# Patient Record
Sex: MALE | Race: WHITE | NOT HISPANIC OR LATINO | Employment: OTHER | URBAN - METROPOLITAN AREA
[De-identification: names, ages, dates, MRNs, and addresses within clinical notes are randomized per-mention and may not be internally consistent; named-entity substitution may affect disease eponyms.]

---

## 2017-01-05 ENCOUNTER — PATIENT MESSAGE (OUTPATIENT)
Dept: FAMILY MEDICINE | Facility: CLINIC | Age: 57
End: 2017-01-05

## 2017-03-13 ENCOUNTER — PATIENT OUTREACH (OUTPATIENT)
Dept: ADMINISTRATIVE | Facility: HOSPITAL | Age: 57
End: 2017-03-13

## 2017-03-27 ENCOUNTER — OFFICE VISIT (OUTPATIENT)
Dept: FAMILY MEDICINE | Facility: CLINIC | Age: 57
End: 2017-03-27
Payer: COMMERCIAL

## 2017-03-27 ENCOUNTER — LAB VISIT (OUTPATIENT)
Dept: LAB | Facility: HOSPITAL | Age: 57
End: 2017-03-27
Attending: FAMILY MEDICINE
Payer: COMMERCIAL

## 2017-03-27 VITALS
WEIGHT: 149.06 LBS | HEART RATE: 87 BPM | HEIGHT: 72 IN | SYSTOLIC BLOOD PRESSURE: 114 MMHG | DIASTOLIC BLOOD PRESSURE: 72 MMHG | TEMPERATURE: 97 F | RESPIRATION RATE: 18 BRPM | BODY MASS INDEX: 20.19 KG/M2

## 2017-03-27 DIAGNOSIS — Z12.5 PROSTATE CANCER SCREENING: ICD-10-CM

## 2017-03-27 DIAGNOSIS — Z00.00 PREVENTATIVE HEALTH CARE: Primary | ICD-10-CM

## 2017-03-27 DIAGNOSIS — E78.00 HYPERCHOLESTEREMIA: ICD-10-CM

## 2017-03-27 DIAGNOSIS — Z12.11 COLON CANCER SCREENING: ICD-10-CM

## 2017-03-27 DIAGNOSIS — Z00.00 PREVENTATIVE HEALTH CARE: ICD-10-CM

## 2017-03-27 LAB
ALBUMIN SERPL BCP-MCNC: 4.3 G/DL
ALP SERPL-CCNC: 66 U/L
ALT SERPL W/O P-5'-P-CCNC: 23 U/L
ANION GAP SERPL CALC-SCNC: 8 MMOL/L
AST SERPL-CCNC: 23 U/L
BASOPHILS # BLD AUTO: 0.02 K/UL
BASOPHILS NFR BLD: 0.4 %
BILIRUB SERPL-MCNC: 1.2 MG/DL
BUN SERPL-MCNC: 20 MG/DL
CALCIUM SERPL-MCNC: 9.8 MG/DL
CHLORIDE SERPL-SCNC: 106 MMOL/L
CHOLEST/HDLC SERPL: 2.6 {RATIO}
CO2 SERPL-SCNC: 28 MMOL/L
CREAT SERPL-MCNC: 1 MG/DL
DIFFERENTIAL METHOD: NORMAL
EOSINOPHIL # BLD AUTO: 0.1 K/UL
EOSINOPHIL NFR BLD: 2 %
ERYTHROCYTE [DISTWIDTH] IN BLOOD BY AUTOMATED COUNT: 13.3 %
EST. GFR  (AFRICAN AMERICAN): >60 ML/MIN/1.73 M^2
EST. GFR  (NON AFRICAN AMERICAN): >60 ML/MIN/1.73 M^2
GLUCOSE SERPL-MCNC: 87 MG/DL
HCT VFR BLD AUTO: 46.2 %
HDL/CHOLESTEROL RATIO: 38.9 %
HDLC SERPL-MCNC: 175 MG/DL
HDLC SERPL-MCNC: 68 MG/DL
HGB BLD-MCNC: 15.4 G/DL
LDLC SERPL CALC-MCNC: 97.4 MG/DL
LYMPHOCYTES # BLD AUTO: 1.2 K/UL
LYMPHOCYTES NFR BLD: 23 %
MCH RBC QN AUTO: 29.7 PG
MCHC RBC AUTO-ENTMCNC: 33.3 %
MCV RBC AUTO: 89 FL
MONOCYTES # BLD AUTO: 0.4 K/UL
MONOCYTES NFR BLD: 7.2 %
NEUTROPHILS # BLD AUTO: 3.6 K/UL
NEUTROPHILS NFR BLD: 67.2 %
NONHDLC SERPL-MCNC: 107 MG/DL
PLATELET # BLD AUTO: 235 K/UL
PMV BLD AUTO: 10.9 FL
POTASSIUM SERPL-SCNC: 4.8 MMOL/L
PROT SERPL-MCNC: 7.3 G/DL
RBC # BLD AUTO: 5.19 M/UL
SODIUM SERPL-SCNC: 142 MMOL/L
TRIGL SERPL-MCNC: 48 MG/DL
TSH SERPL DL<=0.005 MIU/L-ACNC: 1.31 UIU/ML
WBC # BLD AUTO: 5.39 K/UL

## 2017-03-27 PROCEDURE — 80053 COMPREHEN METABOLIC PANEL: CPT

## 2017-03-27 PROCEDURE — 99999 PR PBB SHADOW E&M-EST. PATIENT-LVL III: CPT | Mod: PBBFAC,,, | Performed by: FAMILY MEDICINE

## 2017-03-27 PROCEDURE — 84443 ASSAY THYROID STIM HORMONE: CPT

## 2017-03-27 PROCEDURE — 99396 PREV VISIT EST AGE 40-64: CPT | Mod: S$GLB,,, | Performed by: FAMILY MEDICINE

## 2017-03-27 PROCEDURE — 84153 ASSAY OF PSA TOTAL: CPT

## 2017-03-27 PROCEDURE — 80061 LIPID PANEL: CPT

## 2017-03-27 PROCEDURE — 85025 COMPLETE CBC W/AUTO DIFF WBC: CPT

## 2017-03-27 PROCEDURE — 36415 COLL VENOUS BLD VENIPUNCTURE: CPT | Mod: PO

## 2017-03-27 NOTE — MR AVS SNAPSHOT
[facility]  [address]  [place] [address]  Phone: [phone]                  Kilo Dailey   3/27/2017 8:00 AM   Office Visit    Description:  Male : 1960   Provider:  Yazmin Dash MD   Department:  [facility]           Reason for Visit     Annual Exam           Diagnoses this Visit        Comments    Preventative health care    -  Primary     Hypercholesteremia         Colon cancer screening         Prostate cancer screening                To Do List           Future Appointments        Provider Department Dept Phone    3/27/2017 8:40 AM LABORATORY, [address] [facility] [phone]      Goals (5 Years of Data)     None      [facility]     [facility] Care [facility] -  Assistance  Registered nurses in the [facility] provide clinical advisement, health education, appointment booking, and other advisory services.  Call for this free service at [phone].             Medications           Message regarding Medications     Verify the changes and/or additions to your medication regime listed below are the same as discussed with your clinician today.  If any of these changes or additions are incorrect, please notify your healthcare provider.        STOP taking these medications     glucosamine-chondroitin 500-400 mg tablet Take 1 tablet by mouth 3 (three) times daily.           Verify that the below list of medications is an accurate representation of the medications you are currently taking.  If none reported, the list may be blank. If incorrect, please contact your healthcare provider. Carry this list with you in case of emergency.           Current Medications     atorvastatin (LIPITOR) 10 MG tablet Take 1 tablet (10 mg total) by mouth once daily.    ibuprofen (ADVIL,MOTRIN) 800 MG tablet Take 1 tablet (800 mg total) by mouth 3 (three) times daily. For 14 days total, then only  take as needed for pain           Clinical Reference Information           Your Vitals Were     BP Pulse Temp Resp Height Weight    114/72 87 97.3 °F (36.3 °C) (Tympanic) 18 6' (1.829 m) 67.6 kg (149 lb 0.5 oz)    BMI                20.21 kg/m2          Blood Pressure          Most Recent Value    BP  114/72      Allergies as of 3/27/2017     No Known Allergies      Immunizations Administered on Date of Encounter - 3/27/2017     None      Orders Placed During Today's Visit      Normal Orders This Visit    Case request GI: COLONOSCOPY     Future Labs/Procedures Expected by Expires    CBC auto differential  3/27/2017 5/26/2018    Comprehensive metabolic panel  3/27/2017 5/26/2018    Lipid panel  3/27/2017 5/26/2018    PSA, Screening  3/27/2017 5/26/2018    TSH  3/27/2017 5/26/2018      Language Assistance Services     ATTENTION: Language assistance services are available, free of charge. Please call [phone].      ATENCIÓN: Si habla español, tiene a ramon disposición servicios gratuitos de asistencia lingüística. Llame al [phone].     CHÚ Ý: N?u b?n nói Ti?ng Vi?t, có các d?ch v? h? tr? ngôn ng? mi?n phí dành cho b?n. G?i s? [phone].         [facility] complies with applicable Federal civil rights laws and does not discriminate on the basis of race, color, national origin, age, disability, or sex.

## 2017-03-27 NOTE — PROGRESS NOTES
CHIEF COMPLAINT: This is 56-year-old male here for preventive health exam.    SUBJECTIVE: The patient is generally doing well.  He complains of intermittent right-sided neck pain and pressure-like pain in his right foot, especially if he crosses his legs or ties his shoes too tight.  He also has itching in his left inner thigh area adjacent to scrotum but reports no rash.  He requests evaluation of skin lesions on back.  Patient takes atorvastatin for hyperlipidemia and ibuprofen intermittently for musculoskeletal pain.  He has a history of BPH without urinary obstruction.  Both his father and grandfather had prostate cancer later in life.    Eye exam June 2014. Colonoscopy August 2011, due again in August 2016. Tdap August 2012.     ROS:  GENERAL: Patient denies fever, chills, night sweats. Patient denies weight gain or loss. Patient denies anorexia, fatigue, weakness or swollen glands.  SKIN: Patient denies rash or hair loss.  HEENT: Patient denies sore throat, ear pain, hearing loss, nasal congestion, or runny nose. Patient denies visual disturbance, eye irritation or discharge.  LUNGS: Patient denies cough, wheeze or hemoptysis.  CARDIOVASCULAR: Patient denies chest pain, shortness of breath, palpitations, syncope or lower extremity edema.  GI: Patient denies abdominal pain, nausea, vomiting, diarrhea, constipation, blood in stool or melena.  GENITOURINARY: Patient denies dysuria, frequency, hematuria, nocturia, urgency or incontinence.  MUSCULOSKELETAL: Patient denies joint swelling, redness or warmth.  NEUROLOGIC: Patient denies headache, vertigo, paresthesias, weakness in limb, dysarthria, dysphagia or abnormality of gait.  PSYCHIATRIC: Patient denies anxiety, depression, or memory loss.     OBJECTIVE:   GENERAL: Well-developed well-nourished , white male alert and oriented x3, in no acute distress.   SKIN: Clear without rash. Normal color and tone.  SKs on back.  HEENT: Eyes: Clear conjunctivae. Pupils equal  reactive to light and accommodation. Ears: Clear TMs clear canals. Nose: Without congestion. Pharynx: Without injection or exudates.  NECK: Supple, normal range of motion. No masses, nodes or enlarged thyroid. No JVD. Carotids 2+ and equal. No bruits.  LUNGS: Clear to auscultation. Normal respiratory effort.  CARDIOVASCULAR: Regular rhythm, normal S1, S2 without murmur, gallop or rub.  BACK: No CVA or spinal tenderness.  ABDOMEN: Normal appearance. Active bowel sounds. Soft, nontender without mass or organomegaly. No rebound or guarding.  EXTREMITIES: Without cyanosis, clubbing or edema. Distal pulses 2+ and equal. Normal range of motion in all extremities. No joint effusion, erythema or warmth.  NEUROLOGIC: Cranial nerves II through XII without deficit. Motor strength equal bilaterally. Sensation normal to touch. Deep tendon reflexes 2+ and equal. Gait without abnormality. No tremor. Negative cerebellar signs.  : Circumcised male.  Bilaterally descended testes.  No scrotal swelling or masses.  AUNG: Moderately enlarged prostate, smooth, nontender. No masses. Anorectal exam: No masses, nontender. Heme negative stool x2.    ASSESSMENT:  1. Preventative health care    2. Hypercholesteremia    3. Colon cancer screening    4. Prostate cancer screening      PLAN:   1.  Exercise regularly.  2.  Age-appropriate counseling.  3.  Fasting lab.  4.  Refill medications as needed.  5.  Reassurance regarding above issues.

## 2017-03-28 DIAGNOSIS — Z12.11 COLON CANCER SCREENING: Primary | ICD-10-CM

## 2017-03-28 LAB — COMPLEXED PSA SERPL-MCNC: 1.4 NG/ML

## 2017-03-28 RX ORDER — SODIUM, POTASSIUM,MAG SULFATES 17.5-3.13G
SOLUTION, RECONSTITUTED, ORAL ORAL
Qty: 354 ML | Refills: 0 | Status: ON HOLD | OUTPATIENT
Start: 2017-03-28 | End: 2017-04-25 | Stop reason: HOSPADM

## 2017-04-25 ENCOUNTER — HOSPITAL ENCOUNTER (OUTPATIENT)
Facility: HOSPITAL | Age: 57
Discharge: HOME | End: 2017-04-25
Attending: INTERNAL MEDICINE | Admitting: INTERNAL MEDICINE
Payer: COMMERCIAL

## 2017-04-25 ENCOUNTER — SURGERY (OUTPATIENT)
Age: 57
End: 2017-04-25

## 2017-04-25 ENCOUNTER — ANESTHESIA (OUTPATIENT)
Dept: ENDOSCOPY | Facility: HOSPITAL | Age: 57
End: 2017-04-25
Payer: COMMERCIAL

## 2017-04-25 ENCOUNTER — ANESTHESIA EVENT (OUTPATIENT)
Dept: ENDOSCOPY | Facility: HOSPITAL | Age: 57
End: 2017-04-25
Payer: COMMERCIAL

## 2017-04-25 VITALS
BODY MASS INDEX: 19.91 KG/M2 | WEIGHT: 147 LBS | HEIGHT: 72 IN | SYSTOLIC BLOOD PRESSURE: 118 MMHG | OXYGEN SATURATION: 100 % | RESPIRATION RATE: 11 BRPM | DIASTOLIC BLOOD PRESSURE: 78 MMHG | TEMPERATURE: 98 F | HEART RATE: 78 BPM | RESPIRATION RATE: 20 BRPM

## 2017-04-25 DIAGNOSIS — Z12.11 SCREEN FOR COLON CANCER: ICD-10-CM

## 2017-04-25 PROCEDURE — 37000009 HC ANESTHESIA EA ADD 15 MINS: Performed by: INTERNAL MEDICINE

## 2017-04-25 PROCEDURE — 45380 COLONOSCOPY AND BIOPSY: CPT | Mod: 33,,, | Performed by: INTERNAL MEDICINE

## 2017-04-25 PROCEDURE — 88305 TISSUE EXAM BY PATHOLOGIST: CPT | Performed by: PATHOLOGY

## 2017-04-25 PROCEDURE — 25000003 PHARM REV CODE 250: Performed by: NURSE ANESTHETIST, CERTIFIED REGISTERED

## 2017-04-25 PROCEDURE — 45380 COLONOSCOPY AND BIOPSY: CPT | Performed by: INTERNAL MEDICINE

## 2017-04-25 PROCEDURE — 63600175 PHARM REV CODE 636 W HCPCS: Performed by: NURSE ANESTHETIST, CERTIFIED REGISTERED

## 2017-04-25 PROCEDURE — 27201012 HC FORCEPS, HOT/COLD, DISP: Performed by: INTERNAL MEDICINE

## 2017-04-25 PROCEDURE — 25000003 PHARM REV CODE 250: Performed by: INTERNAL MEDICINE

## 2017-04-25 PROCEDURE — 88305 TISSUE EXAM BY PATHOLOGIST: CPT | Mod: 26,,, | Performed by: PATHOLOGY

## 2017-04-25 PROCEDURE — 37000008 HC ANESTHESIA 1ST 15 MINUTES: Performed by: INTERNAL MEDICINE

## 2017-04-25 RX ORDER — PROPOFOL 10 MG/ML
INJECTION, EMULSION INTRAVENOUS
Status: DISCONTINUED | OUTPATIENT
Start: 2017-04-25 | End: 2017-04-25

## 2017-04-25 RX ORDER — SODIUM CHLORIDE, SODIUM LACTATE, POTASSIUM CHLORIDE, CALCIUM CHLORIDE 600; 310; 30; 20 MG/100ML; MG/100ML; MG/100ML; MG/100ML
INJECTION, SOLUTION INTRAVENOUS CONTINUOUS
Status: DISCONTINUED | OUTPATIENT
Start: 2017-04-25 | End: 2017-04-25 | Stop reason: HOSPADM

## 2017-04-25 RX ORDER — SODIUM CHLORIDE, SODIUM LACTATE, POTASSIUM CHLORIDE, CALCIUM CHLORIDE 600; 310; 30; 20 MG/100ML; MG/100ML; MG/100ML; MG/100ML
INJECTION, SOLUTION INTRAVENOUS CONTINUOUS PRN
Status: DISCONTINUED | OUTPATIENT
Start: 2017-04-25 | End: 2017-04-25

## 2017-04-25 RX ORDER — LIDOCAINE HCL/PF 100 MG/5ML
SYRINGE (ML) INTRAVENOUS
Status: DISCONTINUED | OUTPATIENT
Start: 2017-04-25 | End: 2017-04-25

## 2017-04-25 RX ADMIN — PROPOFOL 30 MG: 10 INJECTION, EMULSION INTRAVENOUS at 09:04

## 2017-04-25 RX ADMIN — LIDOCAINE HYDROCHLORIDE 100 MG: 20 INJECTION, SOLUTION INTRAVENOUS at 09:04

## 2017-04-25 RX ADMIN — PROPOFOL 130 MG: 10 INJECTION, EMULSION INTRAVENOUS at 09:04

## 2017-04-25 RX ADMIN — SODIUM CHLORIDE, SODIUM LACTATE, POTASSIUM CHLORIDE, AND CALCIUM CHLORIDE: 600; 310; 30; 20 INJECTION, SOLUTION INTRAVENOUS at 09:04

## 2017-04-25 NOTE — IP AVS SNAPSHOT
[address] [place] [address]           Patient Discharge Instructions   Our goal is to set you up for success. This packet includes information on your condition, medications, and your home care.  It will help you care for yourself to prevent having to return to the hospital.     Please ask your nurse if you have any questions.      There are many details to remember when preparing to leave the hospital. Here is what you will need to do:    1. Take your medicine. If you are prescribed medications, review your Medication List on the following pages. You may have new medications to  at the pharmacy and others that you'll need to stop taking. Review the instructions for how and when to take your medications. Talk with your doctor or nurses if you are unsure of what to do.     2. Go to your follow-up appointments. Specific follow-up information is listed in the following pages. Your may be contacted by a nurse or clinical provider about future appointments. Be sure we have all of the phone numbers to reach you. Please contact your provider's office if you are unable to make an appointment.     3. Watch for warning signs. Your doctor or nurse will give you detailed warning signs to watch for and when to call for assistance. These instructions may also include educational information about your condition. If you experience any of warning signs to your health, call your doctor.               ** Verify the list of medication(s) below is accurate and up to date. Carry this with you in case of emergency. If your medications have changed, please notify your healthcare provider.             Medication List      CONTINUE taking these medications        Additional Info                      atorvastatin 10 MG tablet   Commonly known as:  LIPITOR   Quantity:  90 tablet   Refills:  0   Dose:  10 mg    Instructions:  Take 1 tablet (10 mg total) by mouth once daily.     Begin  Date    AM    Noon    PM    Bedtime       ibuprofen 800 MG tablet   Commonly known as:  ADVIL,MOTRIN   Quantity:  50 tablet   Refills:  1   Dose:  800 mg    Instructions:  Take 1 tablet (800 mg total) by mouth 3 (three) times daily. For 14 days total, then only take as needed for pain     Begin Date    AM    Noon    PM    Bedtime         STOP taking these medications     sodium,potassium,mag sulfates 17.5-3.13-1.6 gram Solr   Commonly known as:  SUPREP BOWEL PREP KIT                  Please bring to all follow up appointments:    1. A copy of your discharge instructions.  2. All medicines you are currently taking in their original bottles.  3. Identification and insurance card.    Please arrive 15 minutes ahead of scheduled appointment time.    Please call 24 hours in advance if you must reschedule your appointment and/or time.        Follow-up Information     Follow up with Yazmin Dash MD.    Specialty:  Family Medicine    Contact information:    [address] [place] [id number] [phone]          Discharge Instructions     Future Orders    Activity as tolerated     Diet general     Questions:    Total calories:      Fat restriction, if any:      Protein restriction, if any:      Na restriction, if any:      Fluid restriction:      Additional restrictions:          Discharge Instructions         Understanding Colon and Rectal Polyps    The colon (also called the large intestine) is a muscular tube that forms the last part of the digestive tract. It absorbs water and stores food waste. The colon is about 4 to 6 feet long. The rectum is the last 6 inches of the colon. The colon and rectum have a smooth lining composed of millions of cells. Changes in these cells can lead to growths in the colon that can become cancerous and should be removed. Multiple tests are available to screen for colon cancer, but the colonoscopy is the most recommended test. During colonoscopy, these polyps can be removed. How  often you need this test depends on many things including your condition, your family history, symptoms, and what the findings were at the previous colonoscopy.   When the colon lining changes  Changes that happen in the cells that line the colon or rectum can lead to growths called polyps. Over a period of years, polyps can turn cancerous. Removing polyps early may prevent cancer from ever forming.  Polyps  Polyps are fleshy clumps of tissue that form on the lining of the colon or rectum. Small polyps are usually benign (not cancerous). However, over time, cells in a polyp can change and become cancerous. Certain types of polyps known as adenomatous polyps are premalignant. The risk for invasive cancer increases with the size of the polyp and certain cell and gene features. This means that they can become cancerous if they're not removed. Hyperplastic polyps are benign. They can grow quite large and not turn cancerous.   Cancer  Almost all colorectal cancers start when polyp cells begin growing abnormally. As a cancerous tumor grows, it may involve more and more of the colon or rectum. In time, cancer can also grow beyond the colon or rectum and spread to nearby organs or to glands called lymph nodes. The cells can also travel to other parts of the body. This is known as metastasis. The earlier a cancerous tumor is removed, the better the chance of preventing its spread.    Date Last Reviewed: 8/1/2016  © 9865-1904 [employer]. [address] [place] [address]. All rights reserved. This information is not intended as a substitute for professional medical care. Always follow your healthcare professional's instructions.            Admission Information     Date & Time Provider Department CSN    4/25/2017  7:40 AM Edilberto Perez MD [facility] [id number]      Care Providers     Provider Role Specialty Primary office phone    Edilberto Perez MD Attending Provider  Gastroenterology [phone]    Edilberto Perez MD Surgeon  Gastroenterology [phone]      Your Vitals Were     BP Pulse Temp Resp Height Weight    101/57 (BP Location: Left arm, Patient Position: Lying, BP Method: Automatic) 72 97.9 °F (36.6 °C) (Oral) 16 6' (1.829 m) 66.7 kg (147 lb)    SpO2 BMI             96% 19.94 kg/m2         Recent Lab Values     No lab values to display.      Pending Labs     Order Current Status    Specimen to Pathology - Surgery Collected (04/25/17 0929)      Allergies as of 4/25/2017     No Known Allergies      [facility]     [facility] Care [facility] - 24/7 Assistance  Unless otherwise directed by your provider, please contact [facility], our nurse care line that is available for 24/7 assistance.     Registered nurses in the [facility] provide clinical advisement, health education, appointment booking, and other advisory services.  Call for this free service at [phone].        Advance Directives     An advance directive is a document which, in the event you are no longer able to make decisions for yourself, tells your healthcare team what kind of treatment you do or do not want to receive, or who you would like to make those decisions for you.  If you do not currently have an advance directive, [facility] encourages you to create one.  For more information call:  [phone]), [phone] ([phone]),  or log on to [email or web address].        Language Assistance Services     ATTENTION: Language assistance services are available, free of charge. Please call [phone].      ATENCIÓN: Si habla español, tiene a ramon disposición servicios gratuitos de asistencia lingüística. Llame al [phone].     CHÚ Ý: N?u b?n nói Ti?ng Vi?t, có các d?ch v? h? tr? ngôn ng? mi?n phí dành cho b?n. G?i s? [phone].         [facility]  complies with applicable Federal civil rights laws and does not discriminate on  the basis of race, color, national origin, age, disability, or sex.

## 2017-04-25 NOTE — ANESTHESIA RELEASE NOTE
Anesthesia Release from PACU Note    Patient: Kilo Dailey    Procedure(s) Performed: Procedure(s) (LRB):  COLONOSCOPY (N/A)    Anesthesia type: MAC    Post pain: Adequate analgesia    Post assessment: no apparent anesthetic complications, tolerated procedure well and no evidence of recall    Last Vitals:   Visit Vitals    BP (!) 101/57 (BP Location: Left arm, Patient Position: Lying, BP Method: Automatic)    Pulse 72    Temp 36.6 °C (97.9 °F) (Oral)    Resp 16    Ht 6' (1.829 m)    Wt 66.7 kg (147 lb)    SpO2 96%    BMI 19.94 kg/m2       Post vital signs: stable    Level of consciousness: awake and alert     Nausea/Vomiting: no nausea/no vomiting    Complications: none    Airway Patency: patent    Respiratory: unassisted, spontaneous ventilation, room air    Cardiovascular: stable    Hydration: euvolemic

## 2017-04-25 NOTE — H&P
Short Stay Endoscopy History and Physical    PCP - Yazmin Dash MD    Procedure - Colonoscopy  ASA - II  Mallampati - per anesthesia  History of Anesthesia problems - no  Family history Anesthesia problems -  no     HPI:  This is a 56 y.o. male here for evaluation of :  Screening for colon cancer    Average Risk Screening:no  Family history of colon cancer: No  History of polyps: yes  Anemia: No  Blood in stools: No  Diarrhea: No  Abdominal Pain: No    Review of Systems:  CONSTITUTIONAL: Denies weight change,  fatigue, fevers, chills, night sweats.  CARDIOVASCULAR: Denies chest pain, shortness of breath, orthopnea and edema.  RESPIRATORY: Denies cough, hemoptysis, dyspnea, and wheezing.  GI: See HPI.    Medical History:  Past Medical History:   Diagnosis Date    Allergic rhinitis     BPH (benign prostatic hyperplasia)     Colon polyps     Hyperlipidemia     Tennis elbow     Tinea versicolor        Surgical History:   History reviewed. No pertinent surgical history.    Family History:   Family History   Problem Relation Age of Onset    Hyperlipidemia Father     Hearing loss Father     Prostate cancer Father     Prostate cancer Paternal Grandfather     Hyperlipidemia Mother     Dementia Mother     Seizures Mother     Retinitis pigmentosa Sister     Cataracts Sister     Alzheimer's disease Maternal Aunt     Cataracts Maternal Aunt     Alzheimer's disease Maternal Aunt     Alzheimer's disease Maternal Uncle     Cataracts Maternal Uncle     Liver cancer Maternal Uncle     Cataracts Maternal Grandmother        Social History:   Social History   Substance Use Topics    Smoking status: Never Smoker    Smokeless tobacco: Never Used    Alcohol use Yes      Comment: 1-2 times a month       Allergies: Reviewed.    Medications:  No current facility-administered medications on file prior to encounter.      Current Outpatient Prescriptions on File Prior to Encounter   Medication Sig Dispense  Refill    atorvastatin (LIPITOR) 10 MG tablet Take 1 tablet (10 mg total) by mouth once daily. 90 tablet 0    ibuprofen (ADVIL,MOTRIN) 800 MG tablet Take 1 tablet (800 mg total) by mouth 3 (three) times daily. For 14 days total, then only take as needed for pain 50 tablet 1       Physical Exam:  Vital Signs:   Vitals:    04/25/17 0808   BP: (!) 149/80   Pulse: 86   Resp: 18   Temp: 97.8 °F (36.6 °C)     General Appearance: Well appearing in no acute distress  ENT: OP clear  Chest: CTA B  CV: RRR, no m/r/g  Abd: s/nt/nd/nabs  Ext: no edema    Labs:  Lab Results   Component Value Date    WBC 5.39 03/27/2017    HGB 15.4 03/27/2017    HCT 46.2 03/27/2017    MCV 89 03/27/2017     03/27/2017     No results found for: INR, PROTIME  No results found for: IRON, TIBC, FERRITIN, SATURATEDIRO      IMPRESSION:  Patient Active Problem List   Diagnosis    Hypercholesteremia    BPH (benign prostatic hyperplasia)    Screen for colon cancer       Colon polyps    Plan:  I have explained the risks and benefits of colonoscopy to the patient including but not limited to bleeding, perforation, infection, and death. The patient wishes to proceed with colonoscopy.

## 2017-04-25 NOTE — ANESTHESIA PREPROCEDURE EVALUATION
04/25/2017  Kilo Dailey is a 56 y.o., male.    Anesthesia Evaluation    I have reviewed the Patient Summary Reports.    I have reviewed the Nursing Notes.   I have reviewed the Medications.     Review of Systems  Anesthesia Hx:  No problems with previous Anesthesia    Social:  Non-Smoker, No Alcohol Use    Hematology/Oncology:  Hematology Normal   Oncology Normal     EENT/Dental:EENT/Dental Normal   Cardiovascular:   Exercise tolerance: good hyperlipidemia Normal sinus rhythm  Possible Left atrial enlargement  Borderline Abnormal ECG   Pulmonary:   Snore   Hepatic/GI:   Bowel Prep. 0530 last drink of fluid.   Musculoskeletal:   Arthritis     Neurological:  Neurology Normal    Dermatological:  Skin Normal    Psych:  Psychiatric Normal           Physical Exam  General:  Well nourished    Airway/Jaw/Neck:  Airway Findings: Mallampati: II                Anesthesia Plan  Type of Anesthesia, risks & benefits discussed:  Anesthesia Type:  MAC  Patient's Preference:   Intra-op Monitoring Plan:   Intra-op Monitoring Plan Comments:   Post Op Pain Control Plan:   Post Op Pain Control Plan Comments:   Induction:   IV  Beta Blocker:  Patient is not currently on a Beta-Blocker (No further documentation required).       Informed Consent: Patient understands risks and agrees with Anesthesia plan.  Questions answered. Anesthesia consent signed with patient.  ASA Score: 2     Day of Surgery Review of History & Physical: I have interviewed and examined the patient. I have reviewed the patient's H&P dated: 04/25/17. There are no significant changes.  H&P update referred to the surgeon.         Ready For Surgery From Anesthesia Perspective.

## 2017-04-25 NOTE — DISCHARGE INSTRUCTIONS

## 2017-04-25 NOTE — BRIEF OP NOTE
[facility]   Brief Operative Note     SUMMARY     Surgery Date: 4/25/2017     Surgeon(s) and Role:     * Edilberto Perez MD - Primary    Assisting Surgeon: None    Pre-op Diagnosis:  Colon cancer screening [Z12.11]    Post-op Diagnosis:  Post-Op Diagnosis Codes:     * Colon cancer screening [Z12.11]    Procedure(s) (LRB):  COLONOSCOPY (N/A)    Anesthesia: Choice    Description of the findings of the procedure: Procedure completed. See Procedure note for details.     Findings/Key Components:  Procedure completed. See Procedure note for details.     Prosthetic/Devices: None    Estimated Blood Loss: None         Specimens:   Specimen (12h ago through future)    Start     Ordered    04/25/17 0918  Specimen to Pathology - Surgery  Once     Comments:  1. Cecum polyp    04/25/17 0929          Discharge Note    SUMMARY     Admit Date: 4/25/2017    Discharge Date and Time: 4/25/2017    Hospital Course (synopsis of major diagnoses, care, treatment, and services provided during the course of the hospital stay): Procedure completed. See Procedure note for details.     Final Diagnosis: Post-Op Diagnosis Codes:     * Colon cancer screening [Z12.11]    Disposition: Discharge home when discharge criteria met.    Follow Up/Patient Instructions: With primary care physician as previously scheduled.    Medications:  Reconciled Home Medications: Current Discharge Medication List      CONTINUE these medications which have NOT CHANGED    Details   atorvastatin (LIPITOR) 10 MG tablet Take 1 tablet (10 mg total) by mouth once daily.  Qty: 90 tablet, Refills: 0      ibuprofen (ADVIL,MOTRIN) 800 MG tablet Take 1 tablet (800 mg total) by mouth 3 (three) times daily. For 14 days total, then only take as needed for pain  Qty: 50 tablet, Refills: 1         STOP taking these medications       sodium,potassium,mag sulfates (SUPREP BOWEL PREP KIT) 17.5-3.13-1.6 gram SolR Comments:   Reason for Stopping:               Discharge  Procedure Orders  Diet general     Activity as tolerated       Follow-up Information     Follow up with Yazmin Dash MD.    Specialty:  Family Medicine    Contact information:    [address] [place] [address] [phone]

## 2017-04-25 NOTE — ANESTHESIA POSTPROCEDURE EVALUATION
Anesthesia Post Evaluation    Patient: Kilo Dailey    Procedure(s) Performed: Procedure(s) (LRB):  COLONOSCOPY (N/A)    Final Anesthesia Type: MAC  Patient location during evaluation: PACU  Patient participation: Yes- Able to Participate  Level of consciousness: awake and alert and oriented  Post-procedure vital signs: reviewed and stable  Pain management: adequate  Airway patency: patent  PONV status at discharge: No PONV  Anesthetic complications: no      Cardiovascular status: blood pressure returned to baseline  Respiratory status: unassisted, spontaneous ventilation and room air  Hydration status: euvolemic  Follow-up not needed.        Visit Vitals    BP (!) 101/57 (BP Location: Left arm, Patient Position: Lying, BP Method: Automatic)    Pulse 72    Temp 36.6 °C (97.9 °F) (Oral)    Resp 16    Ht 6' (1.829 m)    Wt 66.7 kg (147 lb)    SpO2 96%    BMI 19.94 kg/m2       Pain/Alexander Score: Pain Assessment Performed: Yes (4/25/2017  9:36 AM)  Presence of Pain: denies (4/25/2017  9:36 AM)  Alexander Score: 9 (4/25/2017  9:30 AM)

## 2017-04-25 NOTE — PLAN OF CARE
Sanna came to bedside and discussed findings. NO N/V,  no abdominal pain, no GI bleeding, and vitals stable.  Pt discharged from unit.

## 2017-04-25 NOTE — TRANSFER OF CARE
Anesthesia Transfer of Care Note    Patient: Kilo Dailey    Procedure(s) Performed: Procedure(s) (LRB):  COLONOSCOPY (N/A)    Patient location: PACU    Anesthesia Type: MAC    Transport from OR: Transported from OR on room air with adequate spontaneous ventilation    Post pain: adequate analgesia    Post assessment: no apparent anesthetic complications    Post vital signs: stable    Level of consciousness: awake and alert    Nausea/Vomiting: no nausea/vomiting    Complications: none          Last vitals:   Visit Vitals    BP (!) 101/57 (BP Location: Left arm, Patient Position: Lying, BP Method: Automatic)    Pulse 72    Temp 36.6 °C (97.9 °F) (Oral)    Resp 16    Ht 6' (1.829 m)    Wt 66.7 kg (147 lb)    SpO2 96%    BMI 19.94 kg/m2

## 2017-04-29 RX ORDER — ATORVASTATIN CALCIUM 10 MG/1
10 TABLET, FILM COATED ORAL DAILY
Qty: 90 TABLET | Refills: 3 | Status: SHIPPED | OUTPATIENT
Start: 2017-04-29 | End: 2017-08-01 | Stop reason: SDUPTHER

## 2017-06-01 ENCOUNTER — OFFICE VISIT (OUTPATIENT)
Dept: FAMILY MEDICINE | Facility: CLINIC | Age: 57
End: 2017-06-01
Payer: COMMERCIAL

## 2017-06-01 VITALS
RESPIRATION RATE: 16 BRPM | SYSTOLIC BLOOD PRESSURE: 118 MMHG | TEMPERATURE: 98 F | DIASTOLIC BLOOD PRESSURE: 74 MMHG | HEIGHT: 72 IN | OXYGEN SATURATION: 99 % | WEIGHT: 153 LBS | BODY MASS INDEX: 20.72 KG/M2 | HEART RATE: 74 BPM

## 2017-06-01 DIAGNOSIS — L21.9 SEBORRHEA: Primary | ICD-10-CM

## 2017-06-01 PROBLEM — Z12.11 SCREEN FOR COLON CANCER: Status: RESOLVED | Noted: 2017-04-25 | Resolved: 2017-06-01

## 2017-06-01 PROCEDURE — 99999 PR PBB SHADOW E&M-EST. PATIENT-LVL III: CPT | Mod: PBBFAC,,, | Performed by: REGISTERED NURSE

## 2017-06-01 PROCEDURE — 99213 OFFICE O/P EST LOW 20 MIN: CPT | Mod: S$GLB,,, | Performed by: REGISTERED NURSE

## 2017-06-01 RX ORDER — HYDROCORTISONE BUTYRATE 1 MG/ML
SOLUTION TOPICAL
Qty: 60 ML | Refills: 0 | Status: SHIPPED | OUTPATIENT
Start: 2017-06-01 | End: 2017-11-22 | Stop reason: SDUPTHER

## 2017-06-01 NOTE — PROGRESS NOTES
Subjective:       Patient ID: Kilo Dailey is a 56 y.o. male.    Chief Complaint: Hair/Scalp Problem      HPI    Mr. Dailey reports itchy scalp x few weeks.  Reports scattered areas to posterior scalp with itching and irritation.  Selsun Blue and Head/Shoulders has not helped.      Review of Systems  See HPI.      Patient Active Problem List   Diagnosis    Hypercholesteremia    BPH (benign prostatic hyperplasia)         Objective:     Physical Exam   Constitutional: He is oriented to person, place, and time. He appears well-developed and well-nourished.   HENT:   Head:       Mild seborrhea to scalp noted.   Neurological: He is alert and oriented to person, place, and time.         Medication List with Changes/Refills   New Medications    HYDROCORTISONE BUTYRATE 0.1 % SOLN    Apply to scalp 2 to 3 times daily.   Current Medications    ATORVASTATIN (LIPITOR) 10 MG TABLET    Take 1 tablet (10 mg total) by mouth once daily.   Discontinued Medications    IBUPROFEN (ADVIL,MOTRIN) 800 MG TABLET    Take 1 tablet (800 mg total) by mouth 3 (three) times daily. For 14 days total, then only take as needed for pain           Diagnosis       1. Seborrhea          Assessment/ Plan     Seborrhea  -     hydrocortisone butyrate 0.1 % Soln; Apply to scalp 2 to 3 times daily.  Dispense: 60 mL; Refill: 0      Skin care discussed.  Follow-up in clinic as needed.      JD Walker  [facility]

## 2017-06-01 NOTE — PATIENT INSTRUCTIONS
Understanding Seborrheic Dermatitis    Seborrheic dermatitis is a common type of rash that causes red, scaly, greasy skin. It occurs on skin that has oil glands, such as the face, scalp, and upper chest. It tends to last a long time, or go away and come back. Seborrheicdermatitis is not spread from person to person.  How to say it  [id number]   What causes seborrheic dermatitis?  The cause is not yet known. It may be partly caused by a type of yeast that grows on skin, along with excess oil production. Experts are still learning more. Its more common in men than women, and it can occur at any age. It happens more often in people with HIV/AIDS, Parkinson disease, alcoholic pancreatitis, hepatitis, or cancer. It can also get worse during times of stress.  Symptoms of seborrheic dermatitis  Symptoms can include skin that is:  · Bumpy  · Covered with yellow scales or crusts  · Cracked  · Greasy  · Itchy  · Leaking fluid  · Painful  · Red or orange  These symptoms can occur on skin:  · Around the nose  · Behind the ears  · In the beard  · In the eyebrows  · On the scalp, also known as dandruff  · On the upper chest  You may also have acne, inflamed eyelids (blepharitis), or other skin conditions at the same time.  Treatment for seborrheic dermatitis  Treatment can reduce symptoms for a period of time. The types of treatments most often used include:  · Antifungal shampoo, body wash, or cream. These contain medicines such as ketoconazole, fluconazole, selenium sulfide, ciclopirox, or tea tree oil.  · Corticosteroid cream or ointment. These containmedicines such ashydrocortisone or fluocinolone acetonide.  · Calcineurin inhibitorcream or ointment. These contain medicines such as pimecrolimus or tacrolimus.  · Shampoo or cream with other medicines. These contain medicines such as coal tar, salicylic acid, or zinc pyrithione.  · Sodium sulfacetemide creams and washes. These may also help.  Wash your skin  gently. You can remove scales with oil and gentle rubbing or a brush.  Living with seborrheic dermatitis  Seborrheic dermatitis is an ongoing (chronic) condition. It can go away and then come back. You will likely need to use shampoo, cream, or ointment with medicine once or twice a week. This can help to keep symptoms from coming back or getting worse.  When to call your healthcare provider  Call your healthcare provider right away if you have any of these:  · Symptoms that dont get better, or get worse  · New symptoms   Date Last Reviewed: 5/1/2016  © 2654-1090 [employer]. [address] [place] [address], [place] [address]. All rights reserved. This information is not intended as a substitute for professional medical care. Always follow your healthcare professional's instructions.

## 2017-08-01 RX ORDER — ATORVASTATIN CALCIUM 10 MG/1
TABLET, FILM COATED ORAL
Qty: 90 TABLET | Refills: 2 | Status: SHIPPED | OUTPATIENT
Start: 2017-08-01 | End: 2018-08-29 | Stop reason: SDUPTHER

## 2017-10-16 ENCOUNTER — TELEPHONE (OUTPATIENT)
Dept: FAMILY MEDICINE | Facility: CLINIC | Age: 57
End: 2017-10-16

## 2017-10-16 NOTE — TELEPHONE ENCOUNTER
Pt states he was seen on 6/1/2017  States the lotion helped some  States he still have the itchy spots   Wants to know does he just need the medication refilled or is there something else that he can try

## 2017-10-16 NOTE — TELEPHONE ENCOUNTER
----- Message from Anne Kemp sent at 10/16/2017  3:41 PM CDT -----  Would like to speak to nurse. Please call back at [phone]. thanks

## 2017-10-17 RX ORDER — KETOCONAZOLE 20 MG/ML
SHAMPOO, SUSPENSION TOPICAL
Qty: 120 ML | Refills: 0 | Status: SHIPPED | OUTPATIENT
Start: 2017-10-17 | End: 2022-12-13 | Stop reason: SDUPTHER

## 2017-11-01 ENCOUNTER — PATIENT MESSAGE (OUTPATIENT)
Dept: FAMILY MEDICINE | Facility: CLINIC | Age: 57
End: 2017-11-01

## 2017-11-01 DIAGNOSIS — H26.9 CATARACT OF RIGHT EYE, UNSPECIFIED CATARACT TYPE: Primary | ICD-10-CM

## 2017-11-22 ENCOUNTER — PATIENT MESSAGE (OUTPATIENT)
Dept: FAMILY MEDICINE | Facility: CLINIC | Age: 57
End: 2017-11-22

## 2017-11-22 DIAGNOSIS — L21.9 SEBORRHEA: ICD-10-CM

## 2017-11-22 RX ORDER — HYDROCORTISONE BUTYRATE 1 MG/ML
SOLUTION TOPICAL
Qty: 60 ML | Refills: 0 | Status: SHIPPED | OUTPATIENT
Start: 2017-11-22 | End: 2017-11-27 | Stop reason: SDUPTHER

## 2017-11-27 DIAGNOSIS — L21.9 SEBORRHEA: ICD-10-CM

## 2017-11-27 RX ORDER — HYDROCORTISONE BUTYRATE 1 MG/ML
SOLUTION TOPICAL
Qty: 60 ML | Refills: 0 | Status: SHIPPED | OUTPATIENT
Start: 2017-11-27 | End: 2018-11-28

## 2017-11-29 ENCOUNTER — OFFICE VISIT (OUTPATIENT)
Dept: OPHTHALMOLOGY | Facility: CLINIC | Age: 57
End: 2017-11-29
Payer: COMMERCIAL

## 2017-11-29 DIAGNOSIS — H52.7 REFRACTION DISORDER: ICD-10-CM

## 2017-11-29 DIAGNOSIS — H25.12 NUCLEAR SENILE CATARACT OF LEFT EYE: ICD-10-CM

## 2017-11-29 DIAGNOSIS — H25.11 NUCLEAR SENILE CATARACT OF RIGHT EYE: Primary | ICD-10-CM

## 2017-11-29 DIAGNOSIS — H40.003 GLAUCOMA SUSPECT OF BOTH EYES: ICD-10-CM

## 2017-11-29 PROCEDURE — 92136 OPHTHALMIC BIOMETRY: CPT | Mod: RT,S$GLB,, | Performed by: OPHTHALMOLOGY

## 2017-11-29 PROCEDURE — 92004 COMPRE OPH EXAM NEW PT 1/>: CPT | Mod: S$GLB,,, | Performed by: OPHTHALMOLOGY

## 2017-11-29 PROCEDURE — 92250 FUNDUS PHOTOGRAPHY W/I&R: CPT | Mod: S$GLB,,, | Performed by: OPHTHALMOLOGY

## 2017-11-29 PROCEDURE — 99999 PR PBB SHADOW E&M-EST. PATIENT-LVL III: CPT | Mod: PBBFAC,,, | Performed by: OPHTHALMOLOGY

## 2017-11-29 RX ORDER — PREDNISOLONE ACETATE 10 MG/ML
1 SUSPENSION/ DROPS OPHTHALMIC 4 TIMES DAILY
Qty: 10 ML | Refills: 1 | Status: SHIPPED | OUTPATIENT
Start: 2017-11-29 | End: 2018-01-24 | Stop reason: ALTCHOICE

## 2017-11-29 RX ORDER — POLYMYXIN B SULFATE AND TRIMETHOPRIM 1; 10000 MG/ML; [USP'U]/ML
1 SOLUTION OPHTHALMIC EVERY 4 HOURS
Qty: 1 BOTTLE | Refills: 1 | Status: SHIPPED | OUTPATIENT
Start: 2017-11-29 | End: 2017-12-09

## 2017-11-29 NOTE — PROGRESS NOTES
HPI     [place] [facility].      Was told by O.D. at [facility] that he has a cataract OD.  He states his   vision has decreased at distance OD- hard to see at Distance       NP  CAT OD  H/O WET AMD OD -DAD  H/O DRY AMD OS -DAD  H/O RP-SIS    Last edited by Chidi Matthew MD on 11/29/2017  4:12 PM. (History)            Assessment /Plan     For exam results, see Encounter Report.      ICD-10-CM ICD-9-CM    1. Nuclear senile cataract of right eye H25.11 366.16 Visually Significant Cataract OD  Patient reports decreased vision consistent with the clinical amount of lenticular opacity, which reaches the level of visual significance and affects activities of daily living including reading and glare. Risks, benefits, and alternatives to cataract surgery were discussed.   The pt expressed a desire to proceed with surgery with the potential for some reasonable degree of visual improvement.    Discussed IOL options and refractive outcomes for this patient.    Phaco right eye +18.5   No toric needed per ascan    Block  monofocal IOL. Defers specialty lenses   Will aim for distance  Referral to [facility] for Ophthalmic [facility]  Prescriptions sent for preoperative medications  Pred, Polytrim, and Ilevro  Explained that patient may need glasses after surgery.  Discussed that vision may be limited by glaucoma susp/          2. Nuclear senile cataract of left eye H25.12 366.16 Mild- follow for now    3. Glaucoma suspect of both eyes H40.003 365.00 Discussed exam findings with patient that place the patient at increased risk for COAG. Explained that the diagnosis is uncertain and further testing is indicated. Discussed importance of follow up and completion of indicated studies as well as the risk of blindness from untreated/undiagnosed coag.     4. Refraction disorder H52.7 367.9

## 2017-11-30 ENCOUNTER — TELEPHONE (OUTPATIENT)
Dept: OPHTHALMOLOGY | Facility: CLINIC | Age: 57
End: 2017-11-30

## 2017-12-08 ENCOUNTER — OFFICE VISIT (OUTPATIENT)
Dept: OPHTHALMOLOGY | Facility: CLINIC | Age: 57
End: 2017-12-08
Payer: COMMERCIAL

## 2017-12-08 DIAGNOSIS — Z98.41 CATARACT EXTRACTION STATUS, RIGHT: ICD-10-CM

## 2017-12-08 DIAGNOSIS — Z98.890 POST-OPERATIVE STATE: Primary | ICD-10-CM

## 2017-12-08 PROCEDURE — 99024 POSTOP FOLLOW-UP VISIT: CPT | Mod: S$GLB,,, | Performed by: OPHTHALMOLOGY

## 2017-12-08 PROCEDURE — 99999 PR PBB SHADOW E&M-EST. PATIENT-LVL I: CPT | Mod: PBBFAC,,, | Performed by: OPHTHALMOLOGY

## 2017-12-08 NOTE — PROGRESS NOTES
HPI     Patient states 0/10 on pain scale.        NP  PCIOL OD 12/7/17 BLOCK  H/O WET AMD OD -DAD  H/O DRY AMD OS -DAD  H/O RP-SIS    OD DUREZOL QID, POLYTRIM QID, ILEVRO ONCE DAILY    Last edited by CHRISTOPHER Mosquera on 12/8/2017  8:17 AM. (History)            Assessment /Plan     For exam results, see Encounter Report.      ICD-10-CM ICD-9-CM    1. Post-operative state Z98.890 V45.89        PO Day 1 S/P Phaco/IOL  right eye  Doing well.    Use Durezol QID  Ilevro daily  Polymyxin B 4 x day  Add lucy qid OD  Reinstructed in importance of absolute compliance with Post-OP instructions including medications, shield at bedtime, and limitation of activities. Follow up appointments in approximately one and six weeks or call immediately for increased pain, redness or vision loss.

## 2017-12-13 ENCOUNTER — OFFICE VISIT (OUTPATIENT)
Dept: OPHTHALMOLOGY | Facility: CLINIC | Age: 57
End: 2017-12-13
Payer: COMMERCIAL

## 2017-12-13 DIAGNOSIS — Z98.890 POST-OPERATIVE STATE: Primary | ICD-10-CM

## 2017-12-13 DIAGNOSIS — H25.12 NUCLEAR SENILE CATARACT OF LEFT EYE: ICD-10-CM

## 2017-12-13 DIAGNOSIS — Z98.41 CATARACT EXTRACTION STATUS, RIGHT: ICD-10-CM

## 2017-12-13 PROCEDURE — 99024 POSTOP FOLLOW-UP VISIT: CPT | Mod: S$GLB,,, | Performed by: OPHTHALMOLOGY

## 2017-12-13 PROCEDURE — 99999 PR PBB SHADOW E&M-EST. PATIENT-LVL I: CPT | Mod: PBBFAC,,, | Performed by: OPHTHALMOLOGY

## 2017-12-13 RX ORDER — POLYMYXIN B SULFATE AND TRIMETHOPRIM 1; 10000 MG/ML; [USP'U]/ML
1 SOLUTION OPHTHALMIC EVERY 6 HOURS
COMMUNITY
End: 2018-01-24 | Stop reason: ALTCHOICE

## 2017-12-13 NOTE — PROGRESS NOTES
HPI       Patient returns for a 6 day p.o. Visit, patient states his vision has   improved.        PCIOL OD +18.5 SN60WF / CDE:16.69 / 12/7/17 BLOCK  H/O WET AMD OD -DAD  H/O DRY AMD OS -DAD  H/O RP-SIS    OD PRED. ACET. QID, POLYTRIM QID, ILEVRO ONCE DAILY    Last edited by HCRISTOPHER Mosquera on 12/13/2017  2:20 PM. (History)            Assessment /Plan     For exam results, see Encounter Report.      ICD-10-CM ICD-9-CM    1. Post-operative state Z98.890 V45.89 1 wk s/p phaco OD. Doing well   2. Cataract extraction status, right Z98.41 V45.61    3. Nuclear senile cataract of left eye H25.12 366.16 Not visually significant. Will watch       PO Week 1 S/P Phaco/ IOL right eye:   Doing well with no evidence of infection or abnormal inflammation.     D/C antibiotic drops  Taper Durezol weekly per instruction sheet.  Ilevro daily 7 more days.  Resume normal activitites and d/c eye shield.  OTC reading glasses can be used until evaluated for final MR.  D/c Shield at night    RTC 4 weeks or PRN pain, redness, vision loss, or other concerns.

## 2018-01-24 ENCOUNTER — OFFICE VISIT (OUTPATIENT)
Dept: OPHTHALMOLOGY | Facility: CLINIC | Age: 58
End: 2018-01-24
Payer: COMMERCIAL

## 2018-01-24 DIAGNOSIS — H40.003 GLAUCOMA SUSPECT OF BOTH EYES: ICD-10-CM

## 2018-01-24 DIAGNOSIS — Z98.890 POST-OPERATIVE STATE: Primary | ICD-10-CM

## 2018-01-24 DIAGNOSIS — H25.12 NUCLEAR SENILE CATARACT OF LEFT EYE: ICD-10-CM

## 2018-01-24 DIAGNOSIS — Z98.41 CATARACT EXTRACTION STATUS, RIGHT: ICD-10-CM

## 2018-01-24 PROCEDURE — 99024 POSTOP FOLLOW-UP VISIT: CPT | Mod: S$GLB,,, | Performed by: OPHTHALMOLOGY

## 2018-01-24 PROCEDURE — 99999 PR PBB SHADOW E&M-EST. PATIENT-LVL I: CPT | Mod: PBBFAC,,, | Performed by: OPHTHALMOLOGY

## 2018-02-28 ENCOUNTER — OFFICE VISIT (OUTPATIENT)
Dept: OPHTHALMOLOGY | Facility: CLINIC | Age: 58
End: 2018-02-28
Payer: COMMERCIAL

## 2018-02-28 DIAGNOSIS — H25.12 NUCLEAR SENILE CATARACT OF LEFT EYE: ICD-10-CM

## 2018-02-28 DIAGNOSIS — Z98.41 CATARACT EXTRACTION STATUS, RIGHT: ICD-10-CM

## 2018-02-28 DIAGNOSIS — H40.003 GLAUCOMA SUSPECT OF BOTH EYES: Primary | ICD-10-CM

## 2018-02-28 PROCEDURE — 92133 CPTRZD OPH DX IMG PST SGM ON: CPT | Mod: S$GLB,,, | Performed by: OPHTHALMOLOGY

## 2018-02-28 PROCEDURE — 76514 ECHO EXAM OF EYE THICKNESS: CPT | Mod: S$GLB,,, | Performed by: OPHTHALMOLOGY

## 2018-02-28 PROCEDURE — 99999 PR PBB SHADOW E&M-EST. PATIENT-LVL I: CPT | Mod: PBBFAC,,, | Performed by: OPHTHALMOLOGY

## 2018-02-28 PROCEDURE — 92083 EXTENDED VISUAL FIELD XM: CPT | Mod: S$GLB,,, | Performed by: OPHTHALMOLOGY

## 2018-02-28 PROCEDURE — 92012 INTRM OPH EXAM EST PATIENT: CPT | Mod: S$GLB,,, | Performed by: OPHTHALMOLOGY

## 2018-02-28 NOTE — PROGRESS NOTES
HPI     Patient returns for a one month hvf review, goct iop check, and cct.    Last edited by CHRISTOPHER Mosquera on 2/28/2018  8:26 AM. (History)            Assessment /Plan     For exam results, see Encounter Report.      ICD-10-CM ICD-9-CM    1. Glaucoma suspect of both eyes H40.003 365.00 Zarate Visual Field - OU - Extended - Both Eyes      Posterior Segment OCT Optic Nerve- Both eyes Done today   Appears stable at this time and no evidence of glaucoma at this time   C/D Asym only- follow yearly    2. Nuclear senile cataract of left eye H25.12 366.16   You were found to have an early cataract in your eye(s) today, however the cataract is not affecting your activities of daily living, such as reading and driving.You do not need  surgery at this time. We will recheck your cataract at your next visit. You are welcome to call for an earlier appointment if your vision gets worse.      3. Cataract extraction status, right Z98.41 V45.61 Well        RETURN TO CLINIC 9 month COURTNEY, SDP   Disp MR

## 2018-03-13 ENCOUNTER — PATIENT MESSAGE (OUTPATIENT)
Dept: FAMILY MEDICINE | Facility: CLINIC | Age: 58
End: 2018-03-13

## 2018-03-16 ENCOUNTER — PATIENT MESSAGE (OUTPATIENT)
Dept: FAMILY MEDICINE | Facility: CLINIC | Age: 58
End: 2018-03-16

## 2018-04-10 ENCOUNTER — OFFICE VISIT (OUTPATIENT)
Dept: DERMATOLOGY | Facility: CLINIC | Age: 58
End: 2018-04-10
Payer: COMMERCIAL

## 2018-04-10 VITALS — HEIGHT: 72 IN | WEIGHT: 150.38 LBS | BODY MASS INDEX: 20.37 KG/M2

## 2018-04-10 DIAGNOSIS — L82.1 SEBORRHEIC KERATOSIS: ICD-10-CM

## 2018-04-10 DIAGNOSIS — L21.9 SEBORRHEIC DERMATITIS: Primary | ICD-10-CM

## 2018-04-10 DIAGNOSIS — D18.00 ANGIOMA: ICD-10-CM

## 2018-04-10 PROCEDURE — 99202 OFFICE O/P NEW SF 15 MIN: CPT | Mod: S$GLB,,, | Performed by: DERMATOLOGY

## 2018-04-10 PROCEDURE — 99999 PR PBB SHADOW E&M-EST. PATIENT-LVL III: CPT | Mod: PBBFAC,,, | Performed by: DERMATOLOGY

## 2018-04-10 RX ORDER — CLOBETASOL PROPIONATE 0.5 MG/G
AEROSOL, FOAM TOPICAL
Qty: 50 G | Refills: 3 | Status: SHIPPED | OUTPATIENT
Start: 2018-04-10 | End: 2018-06-26 | Stop reason: SDUPTHER

## 2018-04-10 NOTE — PROGRESS NOTES
Subjective:       Patient ID:  Kilo Dailey is a 57 y.o. male who presents for   Chief Complaint   Patient presents with    Dermatitis     History of Present Illness: The patient presents with chief complaint of dermatitis.  Location: scalp  Duration: 1 year  Signs/Symptoms: pruritus, inflammed    Prior treatments: ketoconazole shampoo, T-gel, head and shoulders, HTC solution          Review of Systems   Constitutional: Negative for fever and chills.   Gastrointestinal: Negative for nausea and vomiting.   Skin: Negative for daily sunscreen use, activity-related sunscreen use and recent sunburn.   Hematologic/Lymphatic: Does not bruise/bleed easily.        Objective:    Physical Exam   Constitutional: He appears well-developed and well-nourished. No distress.   Neurological: He is alert and oriented to person, place, and time. He is not disoriented.   Psychiatric: He has a normal mood and affect.   Skin:   Areas Examined (abnormalities noted in diagram):   Head / Face Inspection Performed  Neck Inspection Performed  Chest / Axilla Inspection Performed  Abdomen Inspection Performed  Back Inspection Performed  RUE Inspected  LUE Inspection Performed  Nails and Digits Inspection Performed                   Diagram Legend     Erythematous scaling macule/papule c/w actinic keratosis       Vascular papule c/w angioma      Pigmented verrucoid papule/plaque c/w seborrheic keratosis      Yellow umbilicated papule c/w sebaceous hyperplasia      Irregularly shaped tan macule c/w lentigo     1-2 mm smooth white papules consistent with Milia      Movable subcutaneous cyst with punctum c/w epidermal inclusion cyst      Subcutaneous movable cyst c/w pilar cyst      Firm pink to brown papule c/w dermatofibroma      Pedunculated fleshy papule(s) c/w skin tag(s)      Evenly pigmented macule c/w junctional nevus     Mildly variegated pigmented, slightly irregular-bordered macule c/w mildly atypical nevus      Flesh colored to  evenly pigmented papule c/w intradermal nevus       Pink pearly papule/plaque c/w basal cell carcinoma      Erythematous hyperkeratotic cursted plaque c/w SCC      Surgical scar with no sign of skin cancer recurrence      Open and closed comedones      Inflammatory papules and pustules      Verrucoid papule consistent consistent with wart     Erythematous eczematous patches and plaques     Dystrophic onycholytic nail with subungual debris c/w onychomycosis     Umbilicated papule    Erythematous-base heme-crusted tan verrucoid plaque consistent with inflamed seborrheic keratosis     Erythematous Silvery Scaling Plaque c/w Psoriasis     See annotation      Assessment / Plan:        Seborrheic dermatitis  -     clobetasol (OLUX) 0.05 % Foam; AAA bid as needed for dryness on scalp  Dispense: 50 g; Refill: 3  Discussed dx, AAD handout given, will start olux foam bid c/ ketoconazole shampoo    Seborrheic keratosis  Reassurance given. Discussed diagnosis and that lesions are benign.  AAD handout given.     Angioma  Reassurance given.  Lesions are benign.                   Follow-up in about 3 months (around 7/10/2018).

## 2018-04-12 ENCOUNTER — OFFICE VISIT (OUTPATIENT)
Dept: FAMILY MEDICINE | Facility: CLINIC | Age: 58
End: 2018-04-12
Payer: COMMERCIAL

## 2018-04-12 ENCOUNTER — LAB VISIT (OUTPATIENT)
Dept: LAB | Facility: HOSPITAL | Age: 58
End: 2018-04-12
Payer: COMMERCIAL

## 2018-04-12 VITALS
TEMPERATURE: 98 F | WEIGHT: 150.38 LBS | DIASTOLIC BLOOD PRESSURE: 80 MMHG | OXYGEN SATURATION: 99 % | HEART RATE: 89 BPM | BODY MASS INDEX: 20.37 KG/M2 | RESPIRATION RATE: 18 BRPM | SYSTOLIC BLOOD PRESSURE: 110 MMHG | HEIGHT: 72 IN

## 2018-04-12 DIAGNOSIS — M25.511 CHRONIC RIGHT SHOULDER PAIN: ICD-10-CM

## 2018-04-12 DIAGNOSIS — N40.0 BENIGN PROSTATIC HYPERPLASIA, UNSPECIFIED WHETHER LOWER URINARY TRACT SYMPTOMS PRESENT: ICD-10-CM

## 2018-04-12 DIAGNOSIS — G89.29 CHRONIC RIGHT SHOULDER PAIN: ICD-10-CM

## 2018-04-12 DIAGNOSIS — Z00.00 PREVENTATIVE HEALTH CARE: Primary | ICD-10-CM

## 2018-04-12 DIAGNOSIS — E78.00 HYPERCHOLESTEREMIA: ICD-10-CM

## 2018-04-12 DIAGNOSIS — Z00.00 PREVENTATIVE HEALTH CARE: ICD-10-CM

## 2018-04-12 LAB
ALBUMIN SERPL BCP-MCNC: 4.7 G/DL
ALP SERPL-CCNC: 57 U/L
ALT SERPL W/O P-5'-P-CCNC: 22 U/L
ANION GAP SERPL CALC-SCNC: 8 MMOL/L
AST SERPL-CCNC: 19 U/L
BASOPHILS # BLD AUTO: 0.04 K/UL
BASOPHILS NFR BLD: 0.7 %
BILIRUB SERPL-MCNC: 1.4 MG/DL
BUN SERPL-MCNC: 21 MG/DL
CALCIUM SERPL-MCNC: 10.3 MG/DL
CHLORIDE SERPL-SCNC: 104 MMOL/L
CHOLEST SERPL-MCNC: 184 MG/DL
CHOLEST/HDLC SERPL: 2.6 {RATIO}
CO2 SERPL-SCNC: 28 MMOL/L
CREAT SERPL-MCNC: 1 MG/DL
DIFFERENTIAL METHOD: NORMAL
EOSINOPHIL # BLD AUTO: 0.1 K/UL
EOSINOPHIL NFR BLD: 1 %
ERYTHROCYTE [DISTWIDTH] IN BLOOD BY AUTOMATED COUNT: 12.8 %
EST. GFR  (AFRICAN AMERICAN): >60 ML/MIN/1.73 M^2
EST. GFR  (NON AFRICAN AMERICAN): >60 ML/MIN/1.73 M^2
GLUCOSE SERPL-MCNC: 96 MG/DL
HCT VFR BLD AUTO: 48 %
HDLC SERPL-MCNC: 70 MG/DL
HDLC SERPL: 38 %
HGB BLD-MCNC: 15.4 G/DL
IMM GRANULOCYTES # BLD AUTO: 0.01 K/UL
IMM GRANULOCYTES NFR BLD AUTO: 0.2 %
LDLC SERPL CALC-MCNC: 104 MG/DL
LYMPHOCYTES # BLD AUTO: 1.4 K/UL
LYMPHOCYTES NFR BLD: 24.3 %
MCH RBC QN AUTO: 28.8 PG
MCHC RBC AUTO-ENTMCNC: 32.1 G/DL
MCV RBC AUTO: 90 FL
MONOCYTES # BLD AUTO: 0.6 K/UL
MONOCYTES NFR BLD: 10.1 %
NEUTROPHILS # BLD AUTO: 3.7 K/UL
NEUTROPHILS NFR BLD: 63.7 %
NONHDLC SERPL-MCNC: 114 MG/DL
NRBC BLD-RTO: 0 /100 WBC
PLATELET # BLD AUTO: 269 K/UL
PMV BLD AUTO: 10.4 FL
POTASSIUM SERPL-SCNC: 5.1 MMOL/L
PROT SERPL-MCNC: 7.4 G/DL
RBC # BLD AUTO: 5.34 M/UL
SODIUM SERPL-SCNC: 140 MMOL/L
TRIGL SERPL-MCNC: 50 MG/DL
TSH SERPL DL<=0.005 MIU/L-ACNC: 1.39 UIU/ML
WBC # BLD AUTO: 5.85 K/UL

## 2018-04-12 PROCEDURE — 85025 COMPLETE CBC W/AUTO DIFF WBC: CPT

## 2018-04-12 PROCEDURE — 80053 COMPREHEN METABOLIC PANEL: CPT

## 2018-04-12 PROCEDURE — 36415 COLL VENOUS BLD VENIPUNCTURE: CPT | Mod: PO

## 2018-04-12 PROCEDURE — 84443 ASSAY THYROID STIM HORMONE: CPT

## 2018-04-12 PROCEDURE — 99396 PREV VISIT EST AGE 40-64: CPT | Mod: S$GLB,,, | Performed by: FAMILY MEDICINE

## 2018-04-12 PROCEDURE — 99999 PR PBB SHADOW E&M-EST. PATIENT-LVL IV: CPT | Mod: PBBFAC,,, | Performed by: FAMILY MEDICINE

## 2018-04-12 PROCEDURE — 80061 LIPID PANEL: CPT

## 2018-04-12 NOTE — PROGRESS NOTES
CHIEF COMPLAINT: This is a 57-year-old male here for preventive health exam.    SUBJECTIVE: The patient's doing well without complaints except for right shoulder pain.  Pain starts in the right neck and radiates into the shoulder joint.  This is been going on for at least 3 years.  Patient takes atorvastatin for hyperlipidemia and ibuprofen intermittently for musculoskeletal pain.  He has a history of BPH without urinary obstruction.  Both his father and grandfather had prostate cancer later in life.     Eye exam February 2018. Colonoscopy March 2017, due again in March 2022. Tdap August 2012.     ROS:  GENERAL: Patient denies fever, chills, night sweats. Patient denies weight gain or loss. Patient denies anorexia, fatigue, weakness or swollen glands.  SKIN: Patient denies rash or hair loss.  HEENT: Patient denies sore throat, ear pain, hearing loss, nasal congestion, or runny nose. Patient denies visual disturbance, eye irritation or discharge.  LUNGS: Patient denies cough, wheeze or hemoptysis.  CARDIOVASCULAR: Patient denies chest pain, shortness of breath, palpitations, syncope or lower extremity edema.  GI: Patient denies abdominal pain, nausea, vomiting, diarrhea, constipation, blood in stool or melena.  GENITOURINARY: Patient denies dysuria, frequency, hematuria, nocturia, urgency or incontinence.  MUSCULOSKELETAL: Patient denies joint swelling, redness or warmth.  NEUROLOGIC: Patient denies headache, vertigo, paresthesias, weakness in limb, dysarthria, dysphagia or abnormality of gait.  PSYCHIATRIC: Patient denies anxiety, depression, or memory loss.     OBJECTIVE:   GENERAL: Well-developed well-nourished , white male alert and oriented x3, in no acute distress.   SKIN: Clear without rash. Normal color and tone.  SKs on back.  HEENT: Eyes: Clear conjunctivae. Pupils equal reactive to light and accommodation. Ears: Clear TMs clear canals. Nose: Without congestion. Pharynx: Without injection or  exudates.  NECK: Supple, normal range of motion. No masses, nodes or enlarged thyroid. No JVD. Carotids 2+ and equal. No bruits.  LUNGS: Clear to auscultation. Normal respiratory effort.  CARDIOVASCULAR: Regular rhythm, normal S1, S2 without murmur, gallop or rub.  BACK: No CVA or spinal tenderness.  ABDOMEN: Normal appearance. Active bowel sounds. Soft, nontender without mass or organomegaly. No rebound or guarding.  EXTREMITIES: Without cyanosis, clubbing or edema. Distal pulses 2+ and equal. Normal range of motion in all extremities. No joint effusion, erythema or warmth.  NEUROLOGIC: Cranial nerves II through XII without deficit. Motor strength equal bilaterally. Sensation normal to touch. Deep tendon reflexes 2+ and equal. Gait without abnormality. No tremor. Negative cerebellar signs.  : Circumcised male.  Bilaterally descended testes.  No scrotal swelling or masses.  AUNG: Moderately enlarged prostate, smooth, nontender. No masses. Anorectal exam: No masses, nontender. Heme negative stool x2.     ASSESSMENT:  1. Preventative health care    2. Hypercholesteremia    3. Benign prostatic hyperplasia, unspecified whether lower urinary tract symptoms present    4. Chronic right shoulder pain      PLAN:   1.  Exercise regularly.  2.  Age-appropriate counseling.  3.  Fasting lab.  4.  Physical therapy for right shoulder and neck pain.  5.  Follow-up annually.  6.  Refill medication as needed.

## 2018-04-20 ENCOUNTER — PATIENT MESSAGE (OUTPATIENT)
Dept: FAMILY MEDICINE | Facility: CLINIC | Age: 58
End: 2018-04-20

## 2018-04-26 ENCOUNTER — PATIENT MESSAGE (OUTPATIENT)
Dept: DERMATOLOGY | Facility: CLINIC | Age: 58
End: 2018-04-26

## 2018-06-25 ENCOUNTER — PATIENT MESSAGE (OUTPATIENT)
Dept: DERMATOLOGY | Facility: CLINIC | Age: 58
End: 2018-06-25

## 2018-06-28 RX ORDER — CLOBETASOL PROPIONATE 0.5 MG/G
AEROSOL, FOAM TOPICAL
Qty: 50 G | Refills: 3 | Status: SHIPPED | OUTPATIENT
Start: 2018-06-28 | End: 2018-10-15 | Stop reason: SDUPTHER

## 2018-08-29 RX ORDER — ATORVASTATIN CALCIUM 10 MG/1
TABLET, FILM COATED ORAL
Qty: 90 TABLET | Refills: 2 | Status: SHIPPED | OUTPATIENT
Start: 2018-08-29 | End: 2019-10-23 | Stop reason: SDUPTHER

## 2018-10-16 RX ORDER — CLOBETASOL PROPIONATE 0.5 MG/G
AEROSOL, FOAM TOPICAL
Qty: 50 G | Refills: 3 | Status: SHIPPED | OUTPATIENT
Start: 2018-10-16 | End: 2018-12-19 | Stop reason: SDUPTHER

## 2018-11-28 ENCOUNTER — OFFICE VISIT (OUTPATIENT)
Dept: OPHTHALMOLOGY | Facility: CLINIC | Age: 58
End: 2018-11-28
Payer: COMMERCIAL

## 2018-11-28 DIAGNOSIS — Z98.41 CATARACT EXTRACTION STATUS, RIGHT: ICD-10-CM

## 2018-11-28 DIAGNOSIS — H25.12 NUCLEAR SENILE CATARACT OF LEFT EYE: Primary | ICD-10-CM

## 2018-11-28 DIAGNOSIS — H40.003 GLAUCOMA SUSPECT OF BOTH EYES: ICD-10-CM

## 2018-11-28 PROCEDURE — 99999 PR PBB SHADOW E&M-EST. PATIENT-LVL II: CPT | Mod: PBBFAC,,, | Performed by: OPHTHALMOLOGY

## 2018-11-28 PROCEDURE — 92136 OPHTHALMIC BIOMETRY: CPT | Mod: 26,LT,S$GLB, | Performed by: OPHTHALMOLOGY

## 2018-11-28 PROCEDURE — 92014 COMPRE OPH EXAM EST PT 1/>: CPT | Mod: S$GLB,,, | Performed by: OPHTHALMOLOGY

## 2018-11-28 RX ORDER — DIFLUPREDNATE OPHTHALMIC 0.5 MG/ML
1 EMULSION OPHTHALMIC 4 TIMES DAILY
Qty: 1 BOTTLE | Refills: 1 | Status: SHIPPED | OUTPATIENT
Start: 2018-11-28 | End: 2018-12-28

## 2018-11-28 RX ORDER — GLUCOSAMINE/CHONDRO SU A 500-400 MG
1 TABLET ORAL DAILY
COMMUNITY

## 2018-11-28 RX ORDER — POLYMYXIN B SULFATE AND TRIMETHOPRIM 1; 10000 MG/ML; [USP'U]/ML
1 SOLUTION OPHTHALMIC EVERY 4 HOURS
Qty: 1 BOTTLE | Refills: 1 | Status: SHIPPED | OUTPATIENT
Start: 2018-11-28 | End: 2018-12-03

## 2018-11-28 NOTE — PROGRESS NOTES
HPI     Patient returns for his yearly exam patient states his vision in OS is   cloudy. Trouble reading and driving- strongly desires sx     PCIOL OD +18.5 SN60WF / CDE:16.69 / 12/7/17 BLOCK  CAT OS  H/O WET AMD OD -DAD  H/O DRY AMD OS -DAD  H/O RP-SIS    Last edited by Chidi Matthew MD on 11/28/2018  9:21 AM. (History)            Assessment /Plan     For exam results, see Encounter Report.      ICD-10-CM ICD-9-CM    1. Nuclear senile cataract of left eye H25.12 366.16 Visually Significant Cataract OS  Patient reports decreased vision consistent with the clinical amount of lenticular opacity, which reaches the level of visual significance and affects activities of daily living including reading and glare. Risks, benefits, and alternatives to cataract surgery were discussed.   The pt expressed a desire to proceed with surgery with the potential for some reasonable degree of visual improvement.    Discussed IOL options and refractive outcomes for this patient.    Phaco left eye,   Block  monofocal IOL.  Will aim for distance  Referral to [facility] for [facility]hthalmic [facility]  Prescriptions sent for preoperative medications  Durezol, polytrim and ilevro  Explained that patient may need glasses after surgery.  Discussed that vision may be limited by retina          2. Glaucoma suspect of both eyes H40.003 365.00 Based on c/d asym- follow at this time    3. Cataract extraction status, right Z98.41 V45.61

## 2018-12-06 ENCOUNTER — OUTSIDE PLACE OF SERVICE (OUTPATIENT)
Dept: ADMINISTRATIVE | Facility: OTHER | Age: 58
End: 2018-12-06
Payer: COMMERCIAL

## 2018-12-06 ENCOUNTER — OFFICE VISIT (OUTPATIENT)
Dept: OPHTHALMOLOGY | Facility: CLINIC | Age: 58
End: 2018-12-06
Payer: COMMERCIAL

## 2018-12-06 DIAGNOSIS — Z98.42 CATARACT EXTRACTION STATUS, LEFT: Primary | ICD-10-CM

## 2018-12-06 PROCEDURE — 99999 PR PBB SHADOW E&M-EST. PATIENT-LVL II: CPT | Mod: PBBFAC,,, | Performed by: OPHTHALMOLOGY

## 2018-12-06 PROCEDURE — 99024 POSTOP FOLLOW-UP VISIT: CPT | Mod: S$GLB,,, | Performed by: OPHTHALMOLOGY

## 2018-12-06 PROCEDURE — 66984 XCAPSL CTRC RMVL W/O ECP: CPT | Mod: LT,,, | Performed by: OPHTHALMOLOGY

## 2018-12-06 RX ORDER — POLYMYXIN B SULFATE AND TRIMETHOPRIM 1; 10000 MG/ML; [USP'U]/ML
1 SOLUTION OPHTHALMIC 4 TIMES DAILY
COMMUNITY
End: 2019-05-10

## 2018-12-06 NOTE — PROGRESS NOTES
HPI     Post-op Evaluation      Additional comments: 1 DAY PCIOL OS              Comments     The patient states his left eye is doing fine so far.    PCIOL OS 12/6/2018   PCIOL OD +18.5 SN60WF / CDE:16.69 / 12/7/17 BLOCK    H/O WET AMD OD -DAD  H/O DRY AMD OS -DAD  H/O RP-SIS    OS- DUREZOL QID, POLYTRIM QID, ILEVRO QD          Last edited by Kelly Engel on 12/6/2018 12:47 PM. (History)            Assessment /Plan     For exam results, see Encounter Report.      ICD-10-CM ICD-9-CM    1. Cataract extraction status, left Z98.42 V45.61        PO Day 1 S/P Phaco/IOL  left eye  Doing well.    Use Durezol QID  Ilevro  Polytrim  4 x day  Reinstructed in importance of absolute compliance with Post-OP instructions including medications, shield at bedtime, and limitation of activities. Follow up appointments in approximately one and six weeks or call immediately for increased pain, redness or vision loss.

## 2018-12-12 ENCOUNTER — OFFICE VISIT (OUTPATIENT)
Dept: OPHTHALMOLOGY | Facility: CLINIC | Age: 58
End: 2018-12-12
Payer: COMMERCIAL

## 2018-12-12 DIAGNOSIS — H40.003 GLAUCOMA SUSPECT OF BOTH EYES: ICD-10-CM

## 2018-12-12 DIAGNOSIS — Z98.41 CATARACT EXTRACTION STATUS, RIGHT: ICD-10-CM

## 2018-12-12 DIAGNOSIS — Z98.42 CATARACT EXTRACTION STATUS, LEFT: Primary | ICD-10-CM

## 2018-12-12 PROCEDURE — 99999 PR PBB SHADOW E&M-EST. PATIENT-LVL II: CPT | Mod: PBBFAC,,, | Performed by: OPHTHALMOLOGY

## 2018-12-12 PROCEDURE — 99024 POSTOP FOLLOW-UP VISIT: CPT | Mod: S$GLB,,, | Performed by: OPHTHALMOLOGY

## 2018-12-12 NOTE — PROGRESS NOTES
HPI     Post-op Evaluation      Additional comments: 1 week PCIOL OS              Comments     The patient states his left eye is doing well and he denies any pain.  100% drop compliance    1. PCIOL OS 12/6/2018   PCIOL OD +18.5 SN60WF / CDE:16.69 / 12/7/17 BLOCK    Coag susp- based on c/d asym.     H/O WET AMD OD -DAD  H/O DRY AMD OS -DAD  H/O RP-SIS    OS- DUREZOL QID, POLYTRIM QID, ILEVRO QD          Last edited by Chidi Matthew MD on 12/12/2018  3:16 PM. (History)            Assessment /Plan     For exam results, see Encounter Report.      ICD-10-CM ICD-9-CM    1. Cataract extraction status, left Z98.42 V45.61 PO Week 1 S/P Phaco/ IOL left eye:   Doing well with no evidence of infection or abnormal inflammation.     D/C antibiotic drops and NSAID  Taper Durezol weekly per instruction sheet.  Resume normal activitites and d/c eye shield.  OTC reading glasses can be used until evaluated for final MR.  D/c Shield at night    RTC 4 weeks or PRN pain, redness, vision loss, or other concerns.        2. Cataract extraction status, right Z98.41 V45.61    3. Glaucoma suspect of both eyes H40.003 365.00

## 2018-12-27 RX ORDER — CLOBETASOL PROPIONATE 0.5 MG/G
AEROSOL, FOAM TOPICAL
Qty: 50 G | Refills: 3 | Status: SHIPPED | OUTPATIENT
Start: 2018-12-27 | End: 2019-03-14 | Stop reason: SDUPTHER

## 2019-01-08 ENCOUNTER — OFFICE VISIT (OUTPATIENT)
Dept: OPHTHALMOLOGY | Facility: CLINIC | Age: 59
End: 2019-01-08
Payer: COMMERCIAL

## 2019-01-08 DIAGNOSIS — Z98.42 CATARACT EXTRACTION STATUS, LEFT: Primary | ICD-10-CM

## 2019-01-08 DIAGNOSIS — Z98.41 CATARACT EXTRACTION STATUS, RIGHT: ICD-10-CM

## 2019-01-08 DIAGNOSIS — H40.013 OPEN ANGLE WITH BORDERLINE FINDINGS OF BOTH EYES: ICD-10-CM

## 2019-01-08 PROCEDURE — 99024 PR POST-OP FOLLOW-UP VISIT: ICD-10-PCS | Mod: S$GLB,,, | Performed by: OPHTHALMOLOGY

## 2019-01-08 PROCEDURE — 99024 POSTOP FOLLOW-UP VISIT: CPT | Mod: S$GLB,,, | Performed by: OPHTHALMOLOGY

## 2019-01-08 PROCEDURE — 99999 PR PBB SHADOW E&M-EST. PATIENT-LVL I: ICD-10-PCS | Mod: PBBFAC,,, | Performed by: OPHTHALMOLOGY

## 2019-01-08 PROCEDURE — 99999 PR PBB SHADOW E&M-EST. PATIENT-LVL I: CPT | Mod: PBBFAC,,, | Performed by: OPHTHALMOLOGY

## 2019-01-08 NOTE — PROGRESS NOTES
HPI     Post-op Evaluation      Additional comments: 1 Month PCIOL OS              Comments     Patient States Vision Doing Well No complaints    1. PCIOL OS 12/6/2018   PCIOL OD +18.5 SN60WF / CDE:16.69 / 12/7/17 BLOCK    Coag susp- based on c/d asym.     H/O WET AMD OD -DAD  H/O DRY AMD OS -DAD  H/O RP-SIS    OS- DUREZOL QID, POLYTRIM QID, ILEVRO QD (Patient States Done With All   Drops)              Last edited by Karlie Islas on 1/8/2019  4:00 PM. (History)            Assessment /Plan     For exam results, see Encounter Report.      ICD-10-CM ICD-9-CM    1. Cataract extraction status, left Z98.42 V45.61 Doing well   2. Cataract extraction status, right Z98.41 V45.61    3. Open angle with borderline findings of both eyes H40.013 365.01 Will follow        Return to clinic 6 months with Hvf and gOCT

## 2019-03-14 ENCOUNTER — PATIENT MESSAGE (OUTPATIENT)
Dept: DERMATOLOGY | Facility: CLINIC | Age: 59
End: 2019-03-14

## 2019-03-14 RX ORDER — CLOBETASOL PROPIONATE 0.5 MG/G
AEROSOL, FOAM TOPICAL
Qty: 50 G | Refills: 3 | Status: SHIPPED | OUTPATIENT
Start: 2019-03-14 | End: 2019-04-30 | Stop reason: SDUPTHER

## 2019-03-15 ENCOUNTER — PATIENT MESSAGE (OUTPATIENT)
Dept: DERMATOLOGY | Facility: CLINIC | Age: 59
End: 2019-03-15

## 2019-04-18 ENCOUNTER — PATIENT MESSAGE (OUTPATIENT)
Dept: FAMILY MEDICINE | Facility: CLINIC | Age: 59
End: 2019-04-18

## 2019-04-23 ENCOUNTER — PATIENT MESSAGE (OUTPATIENT)
Dept: OPHTHALMOLOGY | Facility: CLINIC | Age: 59
End: 2019-04-23

## 2019-04-30 ENCOUNTER — PATIENT MESSAGE (OUTPATIENT)
Dept: DERMATOLOGY | Facility: CLINIC | Age: 59
End: 2019-04-30

## 2019-05-01 RX ORDER — CLOBETASOL PROPIONATE 0.5 MG/G
AEROSOL, FOAM TOPICAL
Qty: 50 G | Refills: 3 | Status: SHIPPED | OUTPATIENT
Start: 2019-05-01 | End: 2019-07-11 | Stop reason: SDUPTHER

## 2019-05-10 ENCOUNTER — OFFICE VISIT (OUTPATIENT)
Dept: FAMILY MEDICINE | Facility: CLINIC | Age: 59
End: 2019-05-10
Payer: COMMERCIAL

## 2019-05-10 ENCOUNTER — LAB VISIT (OUTPATIENT)
Dept: LAB | Facility: HOSPITAL | Age: 59
End: 2019-05-10
Payer: COMMERCIAL

## 2019-05-10 VITALS
TEMPERATURE: 98 F | WEIGHT: 149.69 LBS | BODY MASS INDEX: 20.28 KG/M2 | RESPIRATION RATE: 18 BRPM | SYSTOLIC BLOOD PRESSURE: 100 MMHG | OXYGEN SATURATION: 98 % | DIASTOLIC BLOOD PRESSURE: 68 MMHG | HEART RATE: 83 BPM | HEIGHT: 72 IN

## 2019-05-10 DIAGNOSIS — Z12.5 PROSTATE CANCER SCREENING: ICD-10-CM

## 2019-05-10 DIAGNOSIS — Z00.00 PREVENTATIVE HEALTH CARE: Primary | ICD-10-CM

## 2019-05-10 DIAGNOSIS — Z00.00 PREVENTATIVE HEALTH CARE: ICD-10-CM

## 2019-05-10 DIAGNOSIS — N40.0 BENIGN PROSTATIC HYPERPLASIA, UNSPECIFIED WHETHER LOWER URINARY TRACT SYMPTOMS PRESENT: ICD-10-CM

## 2019-05-10 DIAGNOSIS — E78.00 HYPERCHOLESTEREMIA: ICD-10-CM

## 2019-05-10 LAB
ALBUMIN SERPL BCP-MCNC: 4.1 G/DL (ref 3.5–5.2)
ALP SERPL-CCNC: 58 U/L (ref 55–135)
ALT SERPL W/O P-5'-P-CCNC: 21 U/L (ref 10–44)
ANION GAP SERPL CALC-SCNC: 6 MMOL/L (ref 8–16)
AST SERPL-CCNC: 19 U/L (ref 10–40)
BASOPHILS # BLD AUTO: 0.03 K/UL (ref 0–0.2)
BASOPHILS NFR BLD: 0.6 % (ref 0–1.9)
BILIRUB SERPL-MCNC: 1.2 MG/DL (ref 0.1–1)
BUN SERPL-MCNC: 15 MG/DL (ref 6–20)
CALCIUM SERPL-MCNC: 9.9 MG/DL (ref 8.7–10.5)
CHLORIDE SERPL-SCNC: 106 MMOL/L (ref 95–110)
CHOLEST SERPL-MCNC: 176 MG/DL (ref 120–199)
CHOLEST/HDLC SERPL: 2.5 {RATIO} (ref 2–5)
CO2 SERPL-SCNC: 31 MMOL/L (ref 23–29)
COMPLEXED PSA SERPL-MCNC: 1.4 NG/ML (ref 0–4)
CREAT SERPL-MCNC: 1 MG/DL (ref 0.5–1.4)
DIFFERENTIAL METHOD: NORMAL
EOSINOPHIL # BLD AUTO: 0.2 K/UL (ref 0–0.5)
EOSINOPHIL NFR BLD: 3.2 % (ref 0–8)
ERYTHROCYTE [DISTWIDTH] IN BLOOD BY AUTOMATED COUNT: 12.5 % (ref 11.5–14.5)
EST. GFR  (AFRICAN AMERICAN): >60 ML/MIN/1.73 M^2
EST. GFR  (NON AFRICAN AMERICAN): >60 ML/MIN/1.73 M^2
GLUCOSE SERPL-MCNC: 91 MG/DL (ref 70–110)
HCT VFR BLD AUTO: 47.4 % (ref 40–54)
HDLC SERPL-MCNC: 70 MG/DL (ref 40–75)
HDLC SERPL: 39.8 % (ref 20–50)
HGB BLD-MCNC: 15.3 G/DL (ref 14–18)
IMM GRANULOCYTES # BLD AUTO: 0 K/UL (ref 0–0.04)
IMM GRANULOCYTES NFR BLD AUTO: 0 % (ref 0–0.5)
LDLC SERPL CALC-MCNC: 92.8 MG/DL (ref 63–159)
LYMPHOCYTES # BLD AUTO: 1.4 K/UL (ref 1–4.8)
LYMPHOCYTES NFR BLD: 28.4 % (ref 18–48)
MCH RBC QN AUTO: 29.1 PG (ref 27–31)
MCHC RBC AUTO-ENTMCNC: 32.3 G/DL (ref 32–36)
MCV RBC AUTO: 90 FL (ref 82–98)
MONOCYTES # BLD AUTO: 0.5 K/UL (ref 0.3–1)
MONOCYTES NFR BLD: 11.3 % (ref 4–15)
NEUTROPHILS # BLD AUTO: 2.7 K/UL (ref 1.8–7.7)
NEUTROPHILS NFR BLD: 56.5 % (ref 38–73)
NONHDLC SERPL-MCNC: 106 MG/DL
NRBC BLD-RTO: 0 /100 WBC
PLATELET # BLD AUTO: 256 K/UL (ref 150–350)
PMV BLD AUTO: 10.9 FL (ref 9.2–12.9)
POTASSIUM SERPL-SCNC: 5 MMOL/L (ref 3.5–5.1)
PROT SERPL-MCNC: 6.9 G/DL (ref 6–8.4)
RBC # BLD AUTO: 5.25 M/UL (ref 4.6–6.2)
SODIUM SERPL-SCNC: 143 MMOL/L (ref 136–145)
TRIGL SERPL-MCNC: 66 MG/DL (ref 30–150)
TSH SERPL DL<=0.005 MIU/L-ACNC: 1.81 UIU/ML (ref 0.4–4)
WBC # BLD AUTO: 4.76 K/UL (ref 3.9–12.7)

## 2019-05-10 PROCEDURE — 99999 PR PBB SHADOW E&M-EST. PATIENT-LVL III: CPT | Mod: PBBFAC,,, | Performed by: FAMILY MEDICINE

## 2019-05-10 PROCEDURE — 99396 PR PREVENTIVE VISIT,EST,40-64: ICD-10-PCS | Mod: S$GLB,,, | Performed by: FAMILY MEDICINE

## 2019-05-10 PROCEDURE — 84153 ASSAY OF PSA TOTAL: CPT

## 2019-05-10 PROCEDURE — 80053 COMPREHEN METABOLIC PANEL: CPT

## 2019-05-10 PROCEDURE — 36415 COLL VENOUS BLD VENIPUNCTURE: CPT | Mod: PO

## 2019-05-10 PROCEDURE — 84443 ASSAY THYROID STIM HORMONE: CPT

## 2019-05-10 PROCEDURE — 99396 PREV VISIT EST AGE 40-64: CPT | Mod: S$GLB,,, | Performed by: FAMILY MEDICINE

## 2019-05-10 PROCEDURE — 85025 COMPLETE CBC W/AUTO DIFF WBC: CPT

## 2019-05-10 PROCEDURE — 80061 LIPID PANEL: CPT

## 2019-05-10 PROCEDURE — 99999 PR PBB SHADOW E&M-EST. PATIENT-LVL III: ICD-10-PCS | Mod: PBBFAC,,, | Performed by: FAMILY MEDICINE

## 2019-05-10 RX ORDER — FLUTICASONE PROPIONATE 0.5 MG/G
CREAM TOPICAL 2 TIMES DAILY
Qty: 15 G | Refills: 3 | Status: SHIPPED | OUTPATIENT
Start: 2019-05-10 | End: 2023-01-23 | Stop reason: ALTCHOICE

## 2019-05-10 RX ORDER — FLUTICASONE PROPIONATE 0.5 MG/ML
LOTION TOPICAL
COMMUNITY
End: 2019-05-10

## 2019-05-10 NOTE — PROGRESS NOTES
CHIEF COMPLAINT:  This is a 58-year-old male here for preventive health exam.      SUBJECTIVE: The patient's doing well without complaints except for chronic intermittent right shoulder pain.  Pain starts in the right neck and radiates into the shoulder joint.  This has  been going on for years and he has seen orthopedic specialist in the past.  Patient takes atorvastatin for hyperlipidemia and ibuprofen intermittently for musculoskeletal pain.  He has a history of BPH without urinary obstruction.  Both his father, grandfather and paternal uncle  had prostate cancer later in life.  The patient exercises twice weekly.     Eye exam January 2019.   Colonoscopy March 2017, due again in March 2022. Tdap August 2012.     ROS:  GENERAL: Patient denies fever, chills, night sweats. Patient denies weight gain or loss. Patient denies anorexia, fatigue, weakness or swollen glands.  SKIN: Patient denies rash or hair loss.  HEENT: Patient denies sore throat, ear pain, hearing loss, nasal congestion, or runny nose. Patient denies visual disturbance, eye irritation or discharge.  LUNGS: Patient denies cough, wheeze or hemoptysis.  CARDIOVASCULAR: Patient denies chest pain, shortness of breath, palpitations, syncope or lower extremity edema.  GI: Patient denies abdominal pain, nausea, vomiting, diarrhea, constipation, blood in stool or melena.  GENITOURINARY: Patient denies dysuria, frequency, hematuria, nocturia, urgency or incontinence.  MUSCULOSKELETAL: Patient denies joint swelling, redness or warmth.  NEUROLOGIC: Patient denies headache, vertigo, paresthesias, weakness in limb, dysarthria, dysphagia or abnormality of gait.  PSYCHIATRIC: Patient denies anxiety, depression, or memory loss.     OBJECTIVE:   GENERAL: Well-developed well-nourished , white male alert and oriented x3, in no acute distress.   SKIN: Clear without rash. Normal color and tone.  SKs on back.  HEENT: Eyes: Clear conjunctivae. Pupils equal reactive to light  and accommodation. Ears: Clear TMs clear canals. Nose: Without congestion. Pharynx: Without injection or exudates.  NECK: Supple, normal range of motion. No masses, nodes or enlarged thyroid. No JVD. Carotids 2+ and equal. No bruits.  LUNGS: Clear to auscultation. Normal respiratory effort.  CARDIOVASCULAR: Regular rhythm, normal S1, S2 without murmur, gallop or rub.  BACK: No CVA or spinal tenderness.  ABDOMEN: Normal appearance. Active bowel sounds. Soft, nontender without mass or organomegaly. No rebound or guarding.  EXTREMITIES: Without cyanosis, clubbing or edema. Distal pulses 2+ and equal. Normal range of motion in all extremities. No joint effusion, erythema or warmth.  NEUROLOGIC: Cranial nerves II through XII without deficit. Motor strength equal bilaterally. Sensation normal to touch. Deep tendon reflexes 2+ and equal. Gait without abnormality. No tremor. Negative cerebellar signs.  : Circumcised male.  Bilaterally descended testes.  No scrotal swelling or masses.  AUNG: Moderately enlarged prostate, smooth, nontender. No masses. Anorectal exam: No masses, nontender. Heme negative stool x2    ASSESSMENT:  1. Preventative health care    2. Hypercholesteremia    3. Benign prostatic hyperplasia, unspecified whether lower urinary tract symptoms present    4. Prostate cancer screening      PLAN:   1.  Exercise regularly.  2.  Age-appropriate counseling.  3.  Fasting lab.  4.  Refill of fluticasone 0.05% cream which patient uses for seborrhea.  5.  Follow-up annually.    This note is generated with speech recognition software and is subject to transcription error and sound alike phrases that may be missed by proofreading.

## 2019-07-06 ENCOUNTER — PATIENT MESSAGE (OUTPATIENT)
Dept: DERMATOLOGY | Facility: CLINIC | Age: 59
End: 2019-07-06

## 2019-07-11 ENCOUNTER — OFFICE VISIT (OUTPATIENT)
Dept: DERMATOLOGY | Facility: CLINIC | Age: 59
End: 2019-07-11
Payer: COMMERCIAL

## 2019-07-11 DIAGNOSIS — B36.0 TINEA VERSICOLOR: ICD-10-CM

## 2019-07-11 DIAGNOSIS — L21.9 SEBORRHEIC DERMATITIS: Primary | ICD-10-CM

## 2019-07-11 PROCEDURE — 99999 PR PBB SHADOW E&M-EST. PATIENT-LVL III: ICD-10-PCS | Mod: PBBFAC,,, | Performed by: DERMATOLOGY

## 2019-07-11 PROCEDURE — 99999 PR PBB SHADOW E&M-EST. PATIENT-LVL III: CPT | Mod: PBBFAC,,, | Performed by: DERMATOLOGY

## 2019-07-11 PROCEDURE — 99213 OFFICE O/P EST LOW 20 MIN: CPT | Mod: S$GLB,,, | Performed by: DERMATOLOGY

## 2019-07-11 PROCEDURE — 99213 PR OFFICE/OUTPT VISIT, EST, LEVL III, 20-29 MIN: ICD-10-PCS | Mod: S$GLB,,, | Performed by: DERMATOLOGY

## 2019-07-11 RX ORDER — TRIAMCINOLONE ACETONIDE 0.25 MG/G
CREAM TOPICAL 2 TIMES DAILY PRN
Qty: 30 G | Refills: 1 | Status: SHIPPED | OUTPATIENT
Start: 2019-07-11 | End: 2023-01-23 | Stop reason: ALTCHOICE

## 2019-07-11 RX ORDER — KETOCONAZOLE 20 MG/ML
SHAMPOO, SUSPENSION TOPICAL
Qty: 120 ML | Refills: 5 | Status: SHIPPED | OUTPATIENT
Start: 2019-07-11 | End: 2021-07-16

## 2019-07-11 RX ORDER — CLOBETASOL PROPIONATE 0.5 MG/G
AEROSOL, FOAM TOPICAL
Qty: 50 G | Refills: 3 | Status: SHIPPED | OUTPATIENT
Start: 2019-07-11 | End: 2019-09-30 | Stop reason: SDUPTHER

## 2019-07-11 RX ORDER — KETOCONAZOLE 2 G/100G
AEROSOL, FOAM TOPICAL
Qty: 50 CAN | Refills: 3 | Status: SHIPPED | OUTPATIENT
Start: 2019-07-11

## 2019-07-11 NOTE — PROGRESS NOTES
Subjective:       Patient ID:  Kilo Dailey is a 58 y.o. male who presents for   Chief Complaint   Patient presents with    Seborrheic Dermatitis     seb derm of chest, back, and ears tx ketoconazole shampoo, fluticasone cream     Dry Skin     dry flaky spot of left calf x 20 years     Mole     dark mole on back     Hx of seb derm, last seen on 4/10/18.  He c/o several scaly lesions of back for several years. No tx tried.     For seb derm, he uses olux foam prn.       Review of Systems   Constitutional: Negative for fever and chills.   Gastrointestinal: Negative for nausea and vomiting.   Skin: Positive for rash and dry skin. Negative for daily sunscreen use, activity-related sunscreen use and recent sunburn.   Hematologic/Lymphatic: Does not bruise/bleed easily.        Objective:    Physical Exam   Constitutional: He appears well-developed and well-nourished. No distress.   Neurological: He is alert and oriented to person, place, and time. He is not disoriented.   Psychiatric: He has a normal mood and affect.   Skin:   Areas Examined (abnormalities noted in diagram):   Scalp / Hair Palpated and Inspected  Head / Face Inspection Performed  Neck Inspection Performed  Chest / Axilla Inspection Performed  Abdomen Inspection Performed  Back Inspection Performed  RUE Inspected  LUE Inspection Performed  Nails and Digits Inspection Performed              Diagram Legend     Erythematous scaling macule/papule c/w actinic keratosis       Vascular papule c/w angioma      Pigmented verrucoid papule/plaque c/w seborrheic keratosis      Yellow umbilicated papule c/w sebaceous hyperplasia      Irregularly shaped tan macule c/w lentigo     1-2 mm smooth white papules consistent with Milia      Movable subcutaneous cyst with punctum c/w epidermal inclusion cyst      Subcutaneous movable cyst c/w pilar cyst      Firm pink to brown papule c/w dermatofibroma      Pedunculated fleshy papule(s) c/w skin tag(s)      Evenly  pigmented macule c/w junctional nevus     Mildly variegated pigmented, slightly irregular-bordered macule c/w mildly atypical nevus      Flesh colored to evenly pigmented papule c/w intradermal nevus       Pink pearly papule/plaque c/w basal cell carcinoma      Erythematous hyperkeratotic cursted plaque c/w SCC      Surgical scar with no sign of skin cancer recurrence      Open and closed comedones      Inflammatory papules and pustules      Verrucoid papule consistent consistent with wart     Erythematous eczematous patches and plaques     Dystrophic onycholytic nail with subungual debris c/w onychomycosis     Umbilicated papule    Erythematous-base heme-crusted tan verrucoid plaque consistent with inflamed seborrheic keratosis     Erythematous Silvery Scaling Plaque c/w Psoriasis     See annotation      Assessment / Plan:        Seborrheic dermatitis  -     clobetasol (OLUX) 0.05 % Foam; AAA bid as needed for dryness on scalp  Dispense: 50 g; Refill: 3  -     ketoconazole 2 % Foam; AAA BID for rash on face, chest and back.  For seborrheic dermatitis and tinea versicolor.  Dispense: 50 Can; Refill: 3  -     triamcinolone acetonide 0.025% (KENALOG) 0.025 % cream; Apply topically 2 (two) times daily as needed. Safe for use on face.  Use for < 2 weeks then stop.  Dispense: 30 g; Refill: 1  -     ketoconazole (NIZORAL) 2 % shampoo; Wash hair with medicated shampoo at least 2x/week - let sit on scalp at least 5 minutes prior to rinsing  Dispense: 120 mL; Refill: 5  -     Discussed diagnosis. Will start ketoconazole foam BID for maintenance with TAC 0.025% cream BID prn flares for seb derm of face, will add ketoconazole shampoo 2 times/week for seb derm of scalp.      Tinea versicolor  -     ketoconazole 2 % Foam; AAA BID for rash on face, chest and back.  For seborrheic dermatitis and tinea versicolor.  Dispense: 50 Can; Refill: 3  -     ketoconazole (NIZORAL) 2 % shampoo; Wash hair with medicated shampoo at least  2x/week - let sit on scalp at least 5 minutes prior to rinsing  Dispense: 120 mL; Refill: 5  -     Discussed diagnosis and AVS given.    Seborrheic keratosis  Reassurance given. Discussed diagnosis and that lesions are benign.  AAD handout given.              Follow up in about 1 year (around 7/11/2020).

## 2019-07-23 ENCOUNTER — OFFICE VISIT (OUTPATIENT)
Dept: OPHTHALMOLOGY | Facility: CLINIC | Age: 59
End: 2019-07-23
Payer: COMMERCIAL

## 2019-07-23 DIAGNOSIS — Z96.1 PSEUDOPHAKIA OF BOTH EYES: ICD-10-CM

## 2019-07-23 DIAGNOSIS — Z83.518 FAMILY HISTORY OF RETINITIS PIGMENTOSA: ICD-10-CM

## 2019-07-23 DIAGNOSIS — H40.013 OPEN ANGLE WITH BORDERLINE FINDINGS OF BOTH EYES: Primary | ICD-10-CM

## 2019-07-23 PROCEDURE — 92083 EXTENDED VISUAL FIELD XM: CPT | Mod: S$GLB,,, | Performed by: OPHTHALMOLOGY

## 2019-07-23 PROCEDURE — 92083 HUMPHREY VISUAL FIELD - OU - BOTH EYES: ICD-10-PCS | Mod: S$GLB,,, | Performed by: OPHTHALMOLOGY

## 2019-07-23 PROCEDURE — 99999 PR PBB SHADOW E&M-EST. PATIENT-LVL I: ICD-10-PCS | Mod: PBBFAC,,, | Performed by: OPHTHALMOLOGY

## 2019-07-23 PROCEDURE — 92133 CPTRZD OPH DX IMG PST SGM ON: CPT | Mod: S$GLB,,, | Performed by: OPHTHALMOLOGY

## 2019-07-23 PROCEDURE — 92012 INTRM OPH EXAM EST PATIENT: CPT | Mod: S$GLB,,, | Performed by: OPHTHALMOLOGY

## 2019-07-23 PROCEDURE — 92133 POSTERIOR SEGMENT OCT OPTIC NERVE(OCULAR COHERENCE TOMOGRAPHY) - OU - BOTH EYES: ICD-10-PCS | Mod: S$GLB,,, | Performed by: OPHTHALMOLOGY

## 2019-07-23 PROCEDURE — 99999 PR PBB SHADOW E&M-EST. PATIENT-LVL I: CPT | Mod: PBBFAC,,, | Performed by: OPHTHALMOLOGY

## 2019-07-23 PROCEDURE — 92012 PR EYE EXAM, EST PATIENT,INTERMED: ICD-10-PCS | Mod: S$GLB,,, | Performed by: OPHTHALMOLOGY

## 2019-07-23 NOTE — PROGRESS NOTES
HPI     Glaucoma Suspect      Additional comments: HVF and gOCT              Comments     Vision stable  No pain or discomfort  1. PCIOL OS 12/6/2018   PCIOL OD +18.5 SN60WF / CDE:16.69 / 12/7/17 BLOCK    Coag susp- based on c/d asym.     H/O WET AMD OD -DAD  H/O DRY AMD OS -DAD  H/O RP-SIS              Last edited by Yahaira Carney, PCT on 7/23/2019  3:35 PM. (History)          ROS     Negative for: Constitutional, Gastrointestinal, Neurological, Skin,   Genitourinary, Musculoskeletal, HENT, Endocrine, Cardiovascular, Eyes,   Respiratory, Psychiatric, Allergic/Imm, Heme/Lymph    Last edited by Chidi Matthew MD on 7/23/2019  3:43 PM. (History)        Assessment /Plan     For exam results, see Encounter Report.      ICD-10-CM ICD-9-CM    1. 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       Posterior Segment OCT Optic Nerve- Both eyes   2. Pseudophakia of both eyes Z96.1 V43.1    3. Family history of retinitis pigmentosa Z83.518 V19.19        Return to clinic 9 mo

## 2019-08-20 DIAGNOSIS — S12.9XXA: ICD-10-CM

## 2019-08-20 DIAGNOSIS — M77.8 SHOULDER CAPSULITIS, RIGHT: Primary | ICD-10-CM

## 2019-08-23 ENCOUNTER — CLINICAL SUPPORT (OUTPATIENT)
Dept: REHABILITATION | Facility: HOSPITAL | Age: 59
End: 2019-08-23
Payer: COMMERCIAL

## 2019-08-23 DIAGNOSIS — M25.511 RIGHT SHOULDER PAIN, UNSPECIFIED CHRONICITY: ICD-10-CM

## 2019-08-23 DIAGNOSIS — M25.60 DECREASED RANGE OF MOTION: Primary | ICD-10-CM

## 2019-08-23 PROCEDURE — 97140 MANUAL THERAPY 1/> REGIONS: CPT

## 2019-08-23 PROCEDURE — 97110 THERAPEUTIC EXERCISES: CPT

## 2019-08-23 PROCEDURE — 97161 PT EVAL LOW COMPLEX 20 MIN: CPT

## 2019-08-23 NOTE — PLAN OF CARE
[facility]  Physical Therapy Initial Evaluation    Name: Kilo Dailey  Clinic Number: [id number]    Therapy Diagnosis:   Encounter Diagnoses   Name Primary?    Right shoulder pain, unspecified chronicity     Decreased range of motion Yes     Physician: Amilcar Washington MD    Physician Orders: PT Eval and Treat   Medical Diagnosis from Referral: Shoulder capsulitis, right; Compression fracture of C-spine, initial encounter  Evaluation Date: 8/23/2019  Authorization Period Expiration: 12/31/19  Plan of Care Expiration: 9/22/19  Visit # / Visits authorized: 1/ 20    Time In: 1:00  Time Out: 2:10  Total Billable Time: 55 minutes    Precautions: Standard     Subjective   Date of onset: ~3 months ago  History of current condition - Pelon reports: About 3 months ago began to have symptoms of frozen shoulder on R side.   Recognized from L shoulder and proceeded with ex's from prior PT for R side.  Was still bothering him so F/U with Dr Washington who did do an x-ray and has now referred for PT tx.  Main pain with overhead reaching and behind the back.  Tight in neck and painful on R side.    Still goes to the gym 3 x per week.     Pain:  Current 0/10, worst 9/10, best 0/10   Location: right shoulder   Description: Tight, Sharp and Shooting  Aggravating Factors: Trying to reach, use arm  Easing Factors: rest    Prior Therapy: Yes L frozen sh, ~5 years ago  Social History: Pt lives with their family  Occupation: Retired  Prior Level of Function: No difficulty with R arm  Current Level of Function: Limited with use overhead and behind back, into back seat of car.    Imaging, x-ray:    Medical History:   Past Medical History:   Diagnosis Date    Allergic rhinitis     BPH (benign prostatic hyperplasia)     Colon polyps     Hyperlipidemia     Tennis elbow     Tinea versicolor        Surgical History:   Kilo Dailey  has a past surgical history that includes Colonoscopy (N/A, 4/25/2017);  Cataract extraction w/  intraocular lens implant (Right, 12/07/2017); and Cataract extraction w/  intraocular lens implant (Left, 12/06/2018).    Medications:   Kilo has a current medication list which includes the following prescription(s): atorvastatin, clobetasol, fluticasone propionate, glucosamine-chondroitin, ketoconazole, ketoconazole, ketoconazole, and triamcinolone acetonide 0.025%.    Allergies:   Review of patient's allergies indicates:  No Known Allergies     Pts goals: Full use of R arm/shoulder    Objective     Posture: Pt noted to present with forward head/rounded shoulder posture.  B scapula elevated and protracted, R scapula greater than L.  Poor scapulo/humeral rhythm, on R.     Shoulder ROM:    Active/Passive Joint Range Right Left   Flexion 125/145 150/175   ABDuction 100 p!/90 150/170   External Rotation R UT/60 L infraspinatus/90   Internal Rotation Waist p!/70 T/L junct/ 80   Extension 40 40       Cervical Spine AROM:   degree Pain   FB 40 N   BB 60 N   RSB 30 N   LSB 20 N   RR 60% N   LR 90% N     Strength:  Muscle (Myotome) Right Left   Cervical SB 4+/5 4+/5   Shoulder Abduction 4+/5 4+/5   Elbow Flexors (C5) 4+/5 4+/5   Wrist Extensors (C6) 4+/5 4+/5   Elbow Extensors (C7) 4+/5 4+/5   ER (arm at side) 4+/5 4+/5   IR (arm at side) 4+/5 4+/5   Serratus Ant NT 4+/5   Supraspinatus NT 4+/5    strength 4+/5 4+/5   Intrinsic strength 4+/5 4+/5     Sensation: Intact to light touch    Reflexes: defer    Special Test:  Alonso +  Neers  +     Empty Can -  Drop Arm -     Speeds -  Yergasons -   Decreased MLT lats and pectorals.    Joint Mobility: Decreased jt mobility R glenohumeral joint.    Palpation:   Patient tender with increased tone over B UT, levator scap, lats, teres minor, infraspinatus, subscap, ant chest mm, post cervical mm, SCM, suboccipital mm, med/lateral scapular mm's. .    Function: Patient reports 38.6% score of the Quick DASH Shoulder Questionnaire.  Quick DASH Shoulder  Questionnaire           Score 1-5  1. Opening a tight jar       1  2. Do heavy household chores     3  3. Carry a shopping bag or briefcase     1  4. Wash your back      5  5. Use a knife to cut food     1  6. Recreactional activities requiring force   5  7. Social Limitation      2  8. Work/ADL limitation      3  9. Arm, Shoulder or hand Pain    4  10. Tingling       1  11. Sleeping Limitation      2      TREATMENT   Treatment Time In: 1:30  Treatment Time Out: 2:10  Total Treatment time separate from Evaluation: 25 minutes    Pelon received therapeutic exercises to develop ROM and flexibility for 10 minutes including:  RB rolls, foam roll pectoral stretch, thoracic extension stretch, sh rolls BW, scapular squeezes for LT and scap retraction, and self inferior glide.    Pelon received the following manual therapy techniques: Joint mobilizations and Soft tissue Mobilization were applied to the: R upper quadrant for 15 minutes, including:  Subscap releases, lats, UT/levator scapula, medial and lateral scapular mm, pectoral mm, G/H jt mobs - inferior and post.    Home Exercises and Patient Education Provided    Education provided:   -Education on condition, HEP, and importance of HEP, also discussed that neck issue could be causing some issues in sh.    Written Home Exercises Provided: yes.  Exercises were reviewed and Pelon was able to demonstrate them prior to the end of the session.  Pelon demonstrated good  understanding of the education provided.     See EMR under Patient Instructions for exercises provided 8/23/2019.    Assessment   Kilo is a 58 y.o. male referred to outpatient Physical Therapy with a medical diagnosis of Shoulder capsulitis, right; Compression fracture of C-spine, initial encounter.  The patient presents with signs and symptoms consistent with diagnosis. The patient presents with impairments which include decreased ROM, decreased joint mobility, decreased muscle length, postural abnormalities  and decreased overall function.  These impairments are limiting patient's ability to use R arm for all reaching and ADL's.     Pt prognosis is Good due to personal factors and co-morbidities listed below.   Pt will benefit from skilled outpatient Physical Therapy to address the deficits stated above and in the chart below, provide pt/family education, and to maximize pt's level of independence.     Plan of care discussed with patient: Yes  Pt's spiritual, cultural and educational needs considered and patient is agreeable to the plan of care and goals as stated below:     Anticipated Barriers for therapy: None    Medical Necessity is demonstrated by the following  History  Co-morbidities and personal factors that may impact the plan of care Co-morbidities:   None    Personal Factors:   no deficits     low   Examination  Body Structures and Functions, activity limitations and participation restrictions that may impact the plan of care Body Regions:   upper extremities    Body Systems:    ROM    Participation Restrictions:   None    Activity limitations:   Learning and applying knowledge  no deficits    General Tasks and Commands  no deficits    Communication  no deficits    Mobility  lifting and carrying objects  fine hand use (grasping/picking up)    Self care  washing oneself (bathing, drying, washing hands)  caring for body parts (brushing teeth, shaving, grooming)  toileting  dressing    Domestic Life  shopping  cooking  doing house work (cleaning house, washing dishes, laundry)    Interactions/Relationships  no deficits    Life Areas  no deficits    Community and Social Life  community life  recreation and leisure         low   Clinical Presentation stable and uncomplicated low   Decision Making/ Complexity Score: low     Goals:  Short Term Goals: In 3 weeks   1.Pt to be educated on HEP.  2.Patient to increase R PROM to WNL's.   4.Patient to have pain less than 6/10 at all times.  5.Patient to improve score by 5%  on the Quick Dash.  6. Pt to be educated on postural and body mechanics awareness.    Long Term Goals: In 6 weeks  1. Patient to improve score by 10% form IE on the Quick Dash.  3. Patient to have decreased pain to 3/10 at all times.  4. Patient to demo increase AROM R sh to WFL's without pain.  5. Patient to perform daily activities including reaching overhead, and washing his back without limitation.      Plan   Plan of care Certification: 8/23/2019 to 9/22/19.    Outpatient Physical Therapy 2 times weekly for 6 weeks to include the following interventions: Electrical Stimulation PRN, Manual Therapy, Moist Heat/ Ice, Neuromuscular Re-ed, Patient Education, Self Care, Therapeutic Activites, Therapeutic Exercise and FDN/PRN.     Melissa Loo, PT    Thank you for this referral.    These services are reasonable and necessary for the conditions set forth above while under my care.

## 2019-08-28 ENCOUNTER — CLINICAL SUPPORT (OUTPATIENT)
Dept: REHABILITATION | Facility: HOSPITAL | Age: 59
End: 2019-08-28
Payer: COMMERCIAL

## 2019-08-28 DIAGNOSIS — M25.511 RIGHT SHOULDER PAIN, UNSPECIFIED CHRONICITY: ICD-10-CM

## 2019-08-28 DIAGNOSIS — M25.60 DECREASED RANGE OF MOTION: ICD-10-CM

## 2019-08-28 PROCEDURE — 97140 MANUAL THERAPY 1/> REGIONS: CPT

## 2019-08-28 PROCEDURE — 97110 THERAPEUTIC EXERCISES: CPT

## 2019-08-30 ENCOUNTER — CLINICAL SUPPORT (OUTPATIENT)
Dept: REHABILITATION | Facility: HOSPITAL | Age: 59
End: 2019-08-30
Payer: COMMERCIAL

## 2019-08-30 DIAGNOSIS — M25.60 DECREASED RANGE OF MOTION: ICD-10-CM

## 2019-08-30 DIAGNOSIS — M25.511 RIGHT SHOULDER PAIN, UNSPECIFIED CHRONICITY: ICD-10-CM

## 2019-08-30 PROCEDURE — 97140 MANUAL THERAPY 1/> REGIONS: CPT

## 2019-08-30 PROCEDURE — 97110 THERAPEUTIC EXERCISES: CPT

## 2019-09-01 NOTE — PROGRESS NOTES
Physical Therapy Daily Treatment Note     Name: Kilo Dailey  Clinic Number: [id number]    Therapy Diagnosis:   Encounter Diagnoses   Name Primary?    Right shoulder pain, unspecified chronicity     Decreased range of motion      Physician: Amilcar Washington MD    Visit Date: 8/28/2019    Physician Orders: PT Eval and Treat   Medical Diagnosis from Referral: Shoulder capsulitis, right; Compression fracture of C-spine, initial encounter  Evaluation Date: 8/23/2019  Authorization Period Expiration: 12/31/19  Plan of Care Expiration: 9/22/19  Visit # / Visits authorized: 2/20      Time In: 9:35  Time Out: 10:50  Total Billable Time: 45 minutes    Precautions: Standard    Subjective     Pt reports: He did his HEP his shoulder did seem to loosen up a little.  He was compliant with home exercise program.  Response to previous treatment: Increased motion  Functional change: None reported    Pain: NT/10  Location: right shoulder      Objective     Pelon received therapeutic exercises to develop strength, ROM, flexibility, posture and core stabilization for 30 minutes including:    t-spine ext stretch 3'  Foam roll stretch 3'  faom rolling t-spine 20x  Foam rolling lats 10x  Pulleys ext 2'  Seated lat stretch 2'/10s  Reach, roll, no lift 2'/5s  Seated mat ER stretch 2'/10s  Lat pull downs 2/10 GTB  Prone LT isometrics with lats 2'/5s  PROM R sh 10'    Pelon received the following manual therapy techniques: Joint mobilizations and Soft tissue Mobilization were applied to the: R upper quadrant for 15 minutes, including:  Subscap releases, lats, UT/levator scapula, medial and lateral scapular mm - with and without passive flexion of sh, pectoral mm, post delt, infraspinatus/teres minor, biceps, brachialis along with G/H jt mobs - inferior and post    Home Exercises Provided and Patient Education Provided     Education provided:   - Con't with HEP    Written Home Exercises Provided: yes.  Exercises were reviewed and Pelon  was able to demonstrate them prior to the end of the session.  Pelon demonstrated good  understanding of the education provided.     See EMR under Patient Instructions for exercises provided 8/28/2019.    Assessment     Pt with improvement in mm tone and PROM post tx session, he con'ts to have pian at end range of motion but close to full.    Pelon is progressing well towards his goals.   Pt prognosis is Good.     Pt will continue to benefit from skilled outpatient physical therapy to address the deficits listed in the problem list box on initial evaluation, provide pt/family education and to maximize pt's level of independence in the home and community environment.     Pt's spiritual, cultural and educational needs considered and pt agreeable to plan of care and goals.     Anticipated barriers to physical therapy: None    Goals:   Short Term Goals: In 3 weeks   1.Pt to be educated on HEP.  2.Patient to increase R PROM to WNL's.   4.Patient to have pain less than 6/10 at all times.  5.Patient to improve score by 5% on the Quick Dash.  6. Pt to be educated on postural and body mechanics awareness.     Long Term Goals: In 6 weeks  1. Patient to improve score by 10% form IE on the Quick Dash.  3. Patient to have decreased pain to 3/10 at all times.  4. Patient to demo increase AROM R sh to WFL's without pain.  5. Patient to perform daily activities including reaching overhead, and washing his back without limitation.      Plan     Monitor response to tx and progress with ROM/flexibilty/strengthening PRN.    Melissa Loo, PT

## 2019-09-01 NOTE — PROGRESS NOTES
Physical Therapy Daily Treatment Note     Name: Kilo Dailey  [facility] Number: [id number]    Therapy Diagnosis:   Encounter Diagnoses   Name Primary?    Right shoulder pain, unspecified chronicity     Decreased range of motion      Physician: Amilcar Washington MD    Visit Date: 8/30/2019    Physician Orders: PT Eval and Treat   Medical Diagnosis from Referral: Shoulder capsulitis, right; Compression fracture of C-spine, initial encounter  Evaluation Date: 8/23/2019  Authorization Period Expiration: 12/31/19  Plan of Care Expiration: 9/22/19  Visit # / Visits authorized: 3/20      Time In: 1:30  Time Out: 2:30  Total Billable Time: 45 minutes    Precautions: Standard    Subjective     Pt reports: sore but feels like it was a little looser.  Pt reports he is still going to the gym, we discussed decreasing his weights with chest press to avoid tightening his pectorals any further.  No c/o post FDN.    He was compliant with home exercise program.  Response to previous treatment: Increased motion  Functional change: None reported    Pain: NT/10  Location: right shoulder      Objective     Pelon received therapeutic exercises to develop strength, ROM, flexibility, posture and core stabilization for 30 minutes including:    t-spine ext stretch 3'  Foam roll stretch 3'  Pulleys ext 2'  Seated lat stretch 2'/10s  Reach, roll, no lift 2'/5s  Seated mat ER stretch 2'/10s  Lat pull downs 2/10 GTB  Prone LT isometrics with lats 2'/5s  ER Q-ped reach backs 10x/ea  Thread needle, Q-ped with foam roll 10x/ea  PROM R sh 10'    Pelon received the following manual therapy techniques: Joint mobilizations and Soft tissue Mobilization were applied to the: R upper quadrant for 15 minutes, including:  Subscap releases, lats, UT/levator scapula, medial and lateral scapular mm - with and without passive flexion of sh, pectoral mm, post delt, infraspinatus/teres minor, biceps, brachialis along with G/H jt mobs - inferior and post    FDN to  post delt x 10 min with prolonged estim for twitch response.    Home Exercises Provided and Patient Education Provided     Education provided:   - Con't with HEP    Written Home Exercises Provided: yes.  Exercises were reviewed and Pelon was able to demonstrate them prior to the end of the session.  Pelon demonstrated good  understanding of the education provided.     See EMR under Patient Instructions for exercises provided 8/28/2019.    Assessment     Decreased mm tone post FDN, with improved PROM post tx.  Pt con't with pain at end range although motion is improved.    Pelon is progressing well towards his goals.   Pt prognosis is Good.     Pt will continue to benefit from skilled outpatient physical therapy to address the deficits listed in the problem list box on initial evaluation, provide pt/family education and to maximize pt's level of independence in the home and community environment.     Pt's spiritual, cultural and educational needs considered and pt agreeable to plan of care and goals.     Anticipated barriers to physical therapy: None    Goals:   Short Term Goals: In 3 weeks   1.Pt to be educated on HEP.  2.Patient to increase R PROM to WNL's.   4.Patient to have pain less than 6/10 at all times.  5.Patient to improve score by 5% on the Quick Dash.  6. Pt to be educated on postural and body mechanics awareness.     Long Term Goals: In 6 weeks  1. Patient to improve score by 10% form IE on the Quick Dash.  3. Patient to have decreased pain to 3/10 at all times.  4. Patient to demo increase AROM R sh to WFL's without pain.  5. Patient to perform daily activities including reaching overhead, and washing his back without limitation.      Plan     Monitor response to tx and progress with ROM/flexibilty/strengthening PRN.    Melissa Loo, PT

## 2019-09-03 ENCOUNTER — CLINICAL SUPPORT (OUTPATIENT)
Dept: REHABILITATION | Facility: HOSPITAL | Age: 59
End: 2019-09-03
Payer: COMMERCIAL

## 2019-09-03 DIAGNOSIS — M25.511 RIGHT SHOULDER PAIN, UNSPECIFIED CHRONICITY: ICD-10-CM

## 2019-09-03 DIAGNOSIS — M25.60 DECREASED RANGE OF MOTION: ICD-10-CM

## 2019-09-03 PROCEDURE — 97110 THERAPEUTIC EXERCISES: CPT

## 2019-09-03 PROCEDURE — 97140 MANUAL THERAPY 1/> REGIONS: CPT

## 2019-09-04 NOTE — PROGRESS NOTES
Physical Therapy Daily Treatment Note     Name: Kilo Dailey  Clinic Number: [id number]    Therapy Diagnosis:   Encounter Diagnoses   Name Primary?    Right shoulder pain, unspecified chronicity     Decreased range of motion      Physician: Amilcar Washington MD    Visit Date: 9/3/2019    Physician Orders: PT Eval and Treat   Medical Diagnosis from Referral: Shoulder capsulitis, right; Compression fracture of C-spine, initial encounter  Evaluation Date: 8/23/2019  Authorization Period Expiration: 12/31/19  Plan of Care Expiration: 9/22/19  Visit # / Visits authorized: 4/20      Time In: 8:20  Time Out: 9:15  Total Billable Time: 44 minutes    Precautions: Standard    Subjective     Pt reports: No c/o after last tx session - shoulder con't to be sore at end range but can tell ROM is slightly better.    He was compliant with home exercise program.  Response to previous treatment: Increased motion  Functional change: None reported    Pain: NT/10  Location: right shoulder      Objective     Pelon received therapeutic exercises to develop strength, ROM, flexibility, posture and core stabilization for 29 minutes including:    t-spine foam rolling 3'  Foam roll stretch 3'  Pulleys ext 2'  Seated lat stretch 2'/10s  Reach, roll, no lift 2'/5s  Seated mat ER stretch 2'/10s  Lat pull downs 2/10 GTB  Prone LT isometrics with lats 2'/5s  ER Q-ped reach backs 10x/ea  [place] and arrow 2'/ea  PROM R sh 5'    Pelon received the following manual therapy techniques: Joint mobilizations and Soft tissue Mobilization were applied to the: R upper quadrant for 15 minutes, including:  Subscap releases in neutral and at axilla with sh flexion, lats, UT/levator scapula, medial and lateral scapular mm - with and without passive flexion of sh, pectoral mm, post delt, infraspinatus/teres minor, biceps, brachialis along with G/H jt mobs - inferior and post.  Manual pec minor stretch and releases.    Home Exercises Provided and Patient Education  Provided     Education provided:   - Con't with HEP    Written Home Exercises Provided: yes.  Exercises were reviewed and Pelon was able to demonstrate them prior to the end of the session.  Pelon demonstrated good  understanding of the education provided.     See EMR under Patient Instructions for exercises provided 8/28/2019.    Assessment     PROM slowly improving pain at end range remains..    Pelon is progressing well towards his goals.   Pt prognosis is Good.     Pt will continue to benefit from skilled outpatient physical therapy to address the deficits listed in the problem list box on initial evaluation, provide pt/family education and to maximize pt's level of independence in the home and community environment.     Pt's spiritual, cultural and educational needs considered and pt agreeable to plan of care and goals.     Anticipated barriers to physical therapy: None    Goals:   Short Term Goals: In 3 weeks   1.Pt to be educated on HEP.  2.Patient to increase R PROM to WNL's.   4.Patient to have pain less than 6/10 at all times.  5.Patient to improve score by 5% on the Quick Dash.  6. Pt to be educated on postural and body mechanics awareness.     Long Term Goals: In 6 weeks  1. Patient to improve score by 10% form IE on the Quick Dash.  3. Patient to have decreased pain to 3/10 at all times.  4. Patient to demo increase AROM R sh to WFL's without pain.  5. Patient to perform daily activities including reaching overhead, and washing his back without limitation.      Plan     Monitor response to tx and progress with ROM/flexibilty/strengthening PRN.    Melissa Loo, PT

## 2019-09-06 ENCOUNTER — CLINICAL SUPPORT (OUTPATIENT)
Dept: REHABILITATION | Facility: HOSPITAL | Age: 59
End: 2019-09-06
Payer: COMMERCIAL

## 2019-09-06 DIAGNOSIS — M25.511 RIGHT SHOULDER PAIN, UNSPECIFIED CHRONICITY: ICD-10-CM

## 2019-09-06 DIAGNOSIS — M25.60 DECREASED RANGE OF MOTION: ICD-10-CM

## 2019-09-06 PROCEDURE — 97110 THERAPEUTIC EXERCISES: CPT

## 2019-09-06 PROCEDURE — 97140 MANUAL THERAPY 1/> REGIONS: CPT

## 2019-09-06 PROCEDURE — 97014 ELECTRIC STIMULATION THERAPY: CPT

## 2019-09-06 NOTE — PROGRESS NOTES
Physical Therapy Daily Treatment Note     Name: Kilo Dailey  [facility] Number: [id number]    Therapy Diagnosis:   Encounter Diagnoses   Name Primary?    Right shoulder pain, unspecified chronicity     Decreased range of motion      Physician: Amilcar Washington MD    Visit Date: 9/6/2019    Physician Orders: PT Eval and Treat   Medical Diagnosis from Referral: Shoulder capsulitis, right; Compression fracture of C-spine, initial encounter  Evaluation Date: 8/23/2019  Authorization Period Expiration: 12/31/19  Plan of Care Expiration: 9/22/19  Visit # / Visits authorized: 5/20      Time In: 8:20  Time Out: 9:10  Total Billable Time: 44 minutes    Precautions: Standard    Subjective     Pt reports: Has stopped doing chest press ex's and has been doing foam rolling on days not here.  Can tell ROM is a little better but still painful at end range.    He was compliant with home exercise program.  Response to previous treatment: Increased motion  Functional change: None reported    Pain: NT/10  Location: right shoulder      Objective     Pelon received therapeutic exercises to develop strength, ROM, flexibility, posture and core stabilization for 29 minutes including:    t-spine ext over towel roll 3'  Foam roll stretch 3'  Pulleys ext 2'  Seated lat stretch 2'/10s  Reach, roll, no lift 2'/5s  Seated mat ER stretch 2'/10s  Prone LT isometrics 2'  Prone LT isometrics with lats  2#, 2'/5s  ER Q-ped reach backs 10x/ea  [place] and arrow 2'/ea  PROM R sh 5'    Pelon received the following manual therapy techniques: Joint mobilizations and Soft tissue Mobilization were applied to the: R upper quadrant for 15 minutes, including:  Subscap releases in neutral and at axillary area with sh flexion, lats, UT/levator scapula, medial and lateral scapular mm - with and without passive flexion of sh, pectoral mm, post delt, infraspinatus/teres minor, biceps, brachialis along with G/H jt mobs - inferior and post.  Manual pec minor stretch  and releases.  Suboccipital releases.    Pelon received the following supervised modalities after being cleared for contradictions: IFC Electrical Stimulation:  Kilo received IFC Electrical Stimulation for pain control applied to the C/T mm. Pt received stimulation at 40 % scan at a frequency of  for 10 minutes. Kilo tolerated treatment well without any adverse effects.    Pt also received MHP to cervical spine/UT along with modalities.    Home Exercises Provided and Patient Education Provided     Education provided:   - Con't with HEP    Written Home Exercises Provided: yes.  Exercises were reviewed and Pelon was able to demonstrate them prior to the end of the session.  Pelon demonstrated good  understanding of the education provided.     See EMR under Patient Instructions for exercises provided 8/28/2019.    Assessment     Good tolerance to all tx, full PROM after manual but still painful at end range of motion.    Pelon is progressing well towards his goals.   Pt prognosis is Good.     Pt will continue to benefit from skilled outpatient physical therapy to address the deficits listed in the problem list box on initial evaluation, provide pt/family education and to maximize pt's level of independence in the home and community environment.     Pt's spiritual, cultural and educational needs considered and pt agreeable to plan of care and goals.     Anticipated barriers to physical therapy: None    Goals:   Short Term Goals: In 3 weeks   1.Pt to be educated on HEP.  2.Patient to increase R PROM to WNL's.   4.Patient to have pain less than 6/10 at all times.  5.Patient to improve score by 5% on the Quick Dash.  6. Pt to be educated on postural and body mechanics awareness.     Long Term Goals: In 6 weeks  1. Patient to improve score by 10% form IE on the Quick Dash.  3. Patient to have decreased pain to 3/10 at all times.  4. Patient to demo increase AROM R sh to WFL's without pain.  5. Patient to perform  daily activities including reaching overhead, and washing his back without limitation.      Plan     Monitor response to tx and progress with ROM/flexibilty/strengthening PRN.    Melissa Loo, PT

## 2019-09-10 ENCOUNTER — CLINICAL SUPPORT (OUTPATIENT)
Dept: REHABILITATION | Facility: HOSPITAL | Age: 59
End: 2019-09-10
Payer: COMMERCIAL

## 2019-09-10 DIAGNOSIS — M25.511 RIGHT SHOULDER PAIN, UNSPECIFIED CHRONICITY: ICD-10-CM

## 2019-09-10 DIAGNOSIS — M25.60 DECREASED RANGE OF MOTION: ICD-10-CM

## 2019-09-10 PROCEDURE — 97110 THERAPEUTIC EXERCISES: CPT

## 2019-09-10 PROCEDURE — 97140 MANUAL THERAPY 1/> REGIONS: CPT

## 2019-09-10 PROCEDURE — 97014 ELECTRIC STIMULATION THERAPY: CPT

## 2019-09-11 NOTE — PROGRESS NOTES
Physical Therapy Daily Treatment Note     Name: Kilo Dailey  Clinic Number: [id number]    Therapy Diagnosis:   Encounter Diagnoses   Name Primary?    Right shoulder pain, unspecified chronicity     Decreased range of motion      Physician: Amilcar Washington MD    Visit Date: 9/10/2019    Physician Orders: PT Eval and Treat   Medical Diagnosis from Referral: Shoulder capsulitis, right; Compression fracture of C-spine, initial encounter  Evaluation Date: 8/23/2019  Authorization Period Expiration: 12/31/19  Plan of Care Expiration: 9/22/19  Visit # / Visits authorized: 6/20      Time In: 8:30  Time Out: 9:40  Total Billable Time: 55 minutes    Precautions: Standard    Subjective     Pt reports: Has been doing RB rolls for pectorals and lat foam rolling - which he thinks is helping, con't to be painful at end of PROM.  Heat and estim really helped his neck along with the manual tx last time.    He was compliant with home exercise program.  Response to previous treatment: Increased motion  Functional change: None reported    Pain: NT/10  Location: right shoulder      Objective     Pleon received therapeutic exercises to develop strength, ROM, flexibility, posture and core stabilization for 32 minutes including:    UBE 3' BW for scapular endurance   t-spine ext over towel roll 3'  Foam roll stretch 3'  Pulleys ext 2'  Seated lat stretch 2'/10s  Reach, roll, no lift 2'/5s  Seated mat ER stretch 2'/10s  Prone LT isometrics 2'  Prone LT isometrics with lats, 2'/5s  ER Q-ped reach backs 10x/ea  [place] and arrow 2'/ea - pain with cervical rotation to R reported after ex  PROM R sh 5'    Pelon received the following manual therapy techniques: Joint mobilizations and Soft tissue Mobilization were applied to the: R upper quadrant for 15 minutes, including:  Subscap releases in neutral and at axillary area with sh flexion, lats, UT/levator scapula, medial and lateral scapular mm - with and without passive flexion of sh,  pectoral mm, post delt, infraspinatus/teres minor, biceps, brachialis along with G/H jt mobs - inferior and post.  Manual pec minor stretch and releases.  Sub-occipital releases and SCM releases CORA Bird received the following supervised modalities after being cleared for contradictions: IFC Electrical Stimulation:  Kilo received IFC Electrical Stimulation for pain control applied to the C/T mm. Pt received stimulation at 40 % scan at a frequency of  for 10 minutes. Kilo tolerated treatment well without any adverse effects.    Pt also received MHP to Cervical and UT area x 10 min along with IFC.      Home Exercises Provided and Patient Education Provided     Education provided:   - Con't with HEP    Written Home Exercises Provided: yes.  Exercises were reviewed and Pelon was able to demonstrate them prior to the end of the session.  Pelon demonstrated good  understanding of the education provided.     See EMR under Patient Instructions for exercises provided 8/28/2019.    Assessment     Pt requires cues for therex speed and to avoid substitution, he con't to have improvements of range but pain at end ROM.    Pelon is progressing well towards his goals.   Pt prognosis is Good.     Pt will continue to benefit from skilled outpatient physical therapy to address the deficits listed in the problem list box on initial evaluation, provide pt/family education and to maximize pt's level of independence in the home and community environment.     Pt's spiritual, cultural and educational needs considered and pt agreeable to plan of care and goals.     Anticipated barriers to physical therapy: None    Goals:   Short Term Goals: In 3 weeks   1.Pt to be educated on HEP.  2.Patient to increase R PROM to WNL's.   4.Patient to have pain less than 6/10 at all times.  5.Patient to improve score by 5% on the Quick Dash.  6. Pt to be educated on postural and body mechanics awareness.     Long Term Goals: In 6 weeks  1.  Patient to improve score by 10% form IE on the Quick Dash.  3. Patient to have decreased pain to 3/10 at all times.  4. Patient to demo increase AROM R sh to WFL's without pain.  5. Patient to perform daily activities including reaching overhead, and washing his back without limitation.      Plan     Monitor response to tx and progress with ROM/flexibilty/strengthening PRN.    Melissa Loo, PT

## 2019-09-13 ENCOUNTER — CLINICAL SUPPORT (OUTPATIENT)
Dept: REHABILITATION | Facility: HOSPITAL | Age: 59
End: 2019-09-13
Payer: COMMERCIAL

## 2019-09-13 DIAGNOSIS — M25.60 DECREASED RANGE OF MOTION: ICD-10-CM

## 2019-09-13 DIAGNOSIS — M25.511 RIGHT SHOULDER PAIN, UNSPECIFIED CHRONICITY: ICD-10-CM

## 2019-09-13 PROCEDURE — 97014 ELECTRIC STIMULATION THERAPY: CPT

## 2019-09-13 PROCEDURE — 97140 MANUAL THERAPY 1/> REGIONS: CPT

## 2019-09-13 PROCEDURE — 97110 THERAPEUTIC EXERCISES: CPT

## 2019-09-13 NOTE — PROGRESS NOTES
Physical Therapy Daily Treatment Note     Name: Kilo Dailey  [facility] Number: [id number]    Therapy Diagnosis:   Encounter Diagnoses   Name Primary?    Right shoulder pain, unspecified chronicity     Decreased range of motion      Physician: Amilcar Washington MD    Visit Date: 9/13/2019    Physician Orders: PT Eval and Treat   Medical Diagnosis from Referral: Shoulder capsulitis, right; Compression fracture of C-spine, initial encounter  Evaluation Date: 8/23/2019  Authorization Period Expiration: 12/31/19  Plan of Care Expiration: 9/22/19  Visit # / Visits authorized: 7/20      Time In: 9:00  Time Out: 10:10  Total Billable Time: 44 minutes    Precautions: Standard    Subjective     Pt reports: Still having pain at end range of  Passive motion but overall feeling better.    He was compliant with home exercise program.  Response to previous treatment: Increased motion  Functional change: None reported    Pain: NT/10  Location: right shoulder      Objective     Pelon received therapeutic exercises to develop strength, ROM, flexibility, posture and core stabilization for 34 minutes including:    UBE 3' BW for scapular endurance   t-spine ext over towel roll 3'  Foam roll stretch 3'  Foam rolling t-spine 2'  Pulleys ext 2'  Seated lat stretch 2'/10s  Reach, roll, no lift 2'/5s  Seated mat ER stretch 2'/10s  Prone LT isometrics 2'  Prone LT isometrics with lats, 2'/5s  ER Q-ped reach backs 10x/ea  [place] and arrow 2'/ea - no pain today  PROM R sh 5'    Pelon received the following manual therapy techniques: Joint mobilizations and Soft tissue Mobilization were applied to the: R upper quadrant for 10 minutes, including:  Subscap releases in neutral and at axillary area with sh flexion, lats, UT/levator scapula, medial and lateral scapular mm - with and without passive flexion of sh, pectoral mm, post delt, infraspinatus/teres minor, biceps, brachialis along with G/H jt mobs - inferior and post.  Manual pec minor stretch  and releases.  Sub-occipital releases and SCM releases B.    Pelon received the following supervised modalities after being cleared for contradictions: IFC Electrical Stimulation:  Kilo received IFC Electrical Stimulation for pain control applied to the C/T mm. Pt received stimulation at 40 % scan at a frequency of  for 10 minutes. Kilo tolerated treatment well without any adverse effects.    Pt also received MHP to Cervical and UT area x 10 min along with IFC.      Home Exercises Provided and Patient Education Provided     Education provided:   - Con't with HEP    Written Home Exercises Provided: yes.  Exercises were reviewed and Pelon was able to demonstrate them prior to the end of the session.  Pelon demonstrated good  understanding of the education provided.     See EMR under Patient Instructions for exercises provided 8/28/2019.    Assessment     Pt requires cues for therex speed and to avoid substitution, he con't to have improvements of range but pain at end ROM.    Pelon is progressing well towards his goals.   Pt prognosis is Good.     Pt will continue to benefit from skilled outpatient physical therapy to address the deficits listed in the problem list box on initial evaluation, provide pt/family education and to maximize pt's level of independence in the home and community environment.     Pt's spiritual, cultural and educational needs considered and pt agreeable to plan of care and goals.     Anticipated barriers to physical therapy: None    Goals:   Short Term Goals: In 3 weeks   1.Pt to be educated on HEP.  2.Patient to increase R PROM to WNL's.   4.Patient to have pain less than 6/10 at all times.  5.Patient to improve score by 5% on the Quick Dash.  6. Pt to be educated on postural and body mechanics awareness.     Long Term Goals: In 6 weeks  1. Patient to improve score by 10% form IE on the Quick Dash.  3. Patient to have decreased pain to 3/10 at all times.  4. Patient to demo increase  AROM R sh to WFL's without pain.  5. Patient to perform daily activities including reaching overhead, and washing his back without limitation.      Plan     Monitor response to tx and progress with ROM/flexibilty/strengthening PRN.    Melissa Loo, PT

## 2019-09-17 ENCOUNTER — CLINICAL SUPPORT (OUTPATIENT)
Dept: REHABILITATION | Facility: HOSPITAL | Age: 59
End: 2019-09-17
Payer: COMMERCIAL

## 2019-09-17 DIAGNOSIS — M25.60 DECREASED RANGE OF MOTION: ICD-10-CM

## 2019-09-17 DIAGNOSIS — M25.511 RIGHT SHOULDER PAIN, UNSPECIFIED CHRONICITY: ICD-10-CM

## 2019-09-17 PROCEDURE — 97140 MANUAL THERAPY 1/> REGIONS: CPT

## 2019-09-17 PROCEDURE — 97014 ELECTRIC STIMULATION THERAPY: CPT

## 2019-09-17 PROCEDURE — 97110 THERAPEUTIC EXERCISES: CPT

## 2019-09-17 NOTE — PROGRESS NOTES
Physical Therapy Daily Treatment Note     Name: Kilo Dailey  Clinic Number: [id number]    Therapy Diagnosis:   Encounter Diagnoses   Name Primary?    Right shoulder pain, unspecified chronicity     Decreased range of motion      Physician: Amilcar Washington MD    Visit Date: 9/17/2019    Physician Orders: PT Eval and Treat   Medical Diagnosis from Referral: Shoulder capsulitis, right; Compression fracture of C-spine, initial encounter  Evaluation Date: 8/23/2019  Authorization Period Expiration: 12/31/19  Plan of Care Expiration: 9/22/19  Visit # / Visits authorized: 8/20      Time In: 8:00  Time Out: 9:00  Total Billable Time: 52 minutes    Precautions: Standard    Subjective     Pt reports: Still having pain at end range of motion, neck is still bothering him some but feels better after we work on it - both PT and pt note increase shoulder ROM post cervical STM and sub-occipital release.    He was compliant with home exercise program.  Response to previous treatment: Increased motion  Functional change: None reported    Pain: NT/10  Location: right shoulder      Objective     Pelon received therapeutic exercises to develop strength, ROM, flexibility, posture and core stabilization for 32 minutes including:    UBE 3' BW for scapular endurance   t-spine ext over towel roll 3'  Foam roll stretch 3'  Foam rolling t-spine 2'  Pulleys ext 2'  Seated lat stretch 2'/10s  Reach, roll, no lift 2'/5s  Seated mat ER stretch 2'/10s  Prone LT isometrics 2'  Prone LT isometrics with lats, 2'/5s  [place] and arrow 2'/ea - pain today (reported after ex completed)  PROM R sh 5'    Pelon received the following manual therapy techniques: Joint mobilizations and Soft tissue Mobilization were applied to the: R upper quadrant for 10 minutes, including:  Subscap releases in neutral and at axillary area with sh flexion, lats, UT/levator scapula, medial and lateral scapular mm - with and without passive flexion of sh, pectoral mm, post  delt, infraspinatus/teres minor, biceps, brachialis along with G/H jt mobs - inferior and post.  Manual pec minor stretch and releases.  Sub-occipital releases and SCM releases CORA Bird received the following supervised modalities after being cleared for contradictions: IFC Electrical Stimulation:  Kilo received IFC Electrical Stimulation for pain control applied to the C/T mm. Pt received stimulation at 40 % scan at a frequency of  for 10 minutes. Kilo tolerated treatment well without any adverse effects.    Pt also received MHP to Cervical and UT area x 10 min along with IFC.      Home Exercises Provided and Patient Education Provided     Education provided:   - Con't with HEP    Written Home Exercises Provided: yes.  Exercises were reviewed and Pelon was able to demonstrate them prior to the end of the session.  Pelon demonstrated good  understanding of the education provided.     See EMR under Patient Instructions for exercises provided 8/28/2019.    Assessment     Pt con't to have increased sx's with neck rotation in S/L, will DC bow/arrow.  Increased tone over ant cervical mm.      Pelon is progressing well towards his goals.   Pt prognosis is Good.     Pt will continue to benefit from skilled outpatient physical therapy to address the deficits listed in the problem list box on initial evaluation, provide pt/family education and to maximize pt's level of independence in the home and community environment.     Pt's spiritual, cultural and educational needs considered and pt agreeable to plan of care and goals.     Anticipated barriers to physical therapy: None    Goals:   Short Term Goals: In 3 weeks   1.Pt to be educated on HEP.  2.Patient to increase R PROM to WNL's.   4.Patient to have pain less than 6/10 at all times.  5.Patient to improve score by 5% on the Quick Dash.  6. Pt to be educated on postural and body mechanics awareness.     Long Term Goals: In 6 weeks  1. Patient to improve score  by 10% form IE on the Quick Dash.  3. Patient to have decreased pain to 3/10 at all times.  4. Patient to demo increase AROM R sh to WFL's without pain.  5. Patient to perform daily activities including reaching overhead, and washing his back without limitation.      Plan     Monitor response to tx and progress with ROM/flexibilty/strengthening PRN.  Check thoracic mobility next visit.    Melissa Loo, PT

## 2019-09-20 ENCOUNTER — CLINICAL SUPPORT (OUTPATIENT)
Dept: REHABILITATION | Facility: HOSPITAL | Age: 59
End: 2019-09-20
Payer: COMMERCIAL

## 2019-09-20 DIAGNOSIS — M25.60 DECREASED RANGE OF MOTION: ICD-10-CM

## 2019-09-20 DIAGNOSIS — M25.511 RIGHT SHOULDER PAIN, UNSPECIFIED CHRONICITY: ICD-10-CM

## 2019-09-20 PROCEDURE — 97014 ELECTRIC STIMULATION THERAPY: CPT

## 2019-09-20 PROCEDURE — 97110 THERAPEUTIC EXERCISES: CPT

## 2019-09-20 PROCEDURE — 97140 MANUAL THERAPY 1/> REGIONS: CPT

## 2019-09-20 NOTE — PROGRESS NOTES
Physical Therapy Daily Treatment Note     Name: Kilo Dailey  Clinic Number: [id number]    Therapy Diagnosis:   Encounter Diagnoses   Name Primary?    Right shoulder pain, unspecified chronicity     Decreased range of motion      Physician: Amilcar Washington MD    Visit Date: 9/20/2019    Physician Orders: PT Eval and Treat   Medical Diagnosis from Referral: Shoulder capsulitis, right; Compression fracture of C-spine, initial encounter  Evaluation Date: 8/23/2019  Authorization Period Expiration: 12/31/19  Plan of Care Expiration: 9/22/19  Visit # / Visits authorized: 9/20      Time In: 8:15  Time Out: 9:15  Total Billable Time: 52 minutes    Precautions: Standard    Subjective     Pt reports: Doing better with ROM at shoulder still having pain at end range, neck is still painful at times.  No c/o with progressions.    He was compliant with home exercise program.  Response to previous treatment: Increased motion  Functional change: None reported    Pain: NT/10  Location: right shoulder      Objective     Pelon received therapeutic exercises to develop strength, ROM, flexibility, posture and core stabilization for 34 minutes including:    UBE 3' BW for scapular endurance   t-spine ext over towel roll 3'  Foam roll stretch 3'  Pulleys ext 2'  Quadruped ER reach backs 10x/ea  Seated lat stretch 2'/10s  Reach, roll, no lift 2'/5s  Seated mat ER stretch 2'/10s  Prone LT isometrics 2'  Prone LT isometrics with lats, 2'/5s  Prone rows 2#, 2'  Supine cervical rot 2'  PROM R sh 5'    Pelon received the following manual therapy techniques: Joint mobilizations and Soft tissue Mobilization were applied to the: R upper quadrant for 10 minutes, including:  Subscap releases in neutral and at axillary area with sh flexion, lats, UT/levator scapula, medial and lateral scapular mm - with and without passive flexion of sh, pectoral mm, post delt, infraspinatus/teres minor, biceps, brachialis along with G/H jt mobs - inferior and  post.  Manual pec minor stretch and releases.  Sub-occipital releases and SCM releases B.  Thoracic spine mobility in supported sitting and prone rib and thoracic mobility.      Pelon received the following supervised modalities after being cleared for contradictions: IFC Electrical Stimulation:  Kilo received IFC Electrical Stimulation for pain control applied to the C/T mm. Pt received stimulation at 40 % scan at a frequency of  for 10 minutes. Kilo tolerated treatment well without any adverse effects.    Pt also received MHP to Cervical and UT area x 10 min along with IFC.      Home Exercises Provided and Patient Education Provided     Education provided:   - Con't with HEP    Written Home Exercises Provided: Patient instructed to cont prior HEP.  Exercises were reviewed and Pelon was able to demonstrate them prior to the end of the session.  Pelon demonstrated good  understanding of the education provided.     See EMR under Patient Instructions for exercises provided prior visit.    Assessment     Increasing PROM and decreased mm tone noted, pt with decreased thoracic mobility throughout mid back and ribs.    Pelon is progressing well towards his goals.   Pt prognosis is Good.     Pt will continue to benefit from skilled outpatient physical therapy to address the deficits listed in the problem list box on initial evaluation, provide pt/family education and to maximize pt's level of independence in the home and community environment.     Pt's spiritual, cultural and educational needs considered and pt agreeable to plan of care and goals.     Anticipated barriers to physical therapy: None    Goals:   Short Term Goals: In 3 weeks   1.Pt to be educated on HEP.  2.Patient to increase R PROM to WNL's.   4.Patient to have pain less than 6/10 at all times.  5.Patient to improve score by 5% on the Quick Dash.  6. Pt to be educated on postural and body mechanics awareness.     Long Term Goals: In 6 weeks  1.  Patient to improve score by 10% form IE on the Quick Dash.  3. Patient to have decreased pain to 3/10 at all times.  4. Patient to demo increase AROM R sh to WFL's without pain.  5. Patient to perform daily activities including reaching overhead, and washing his back without limitation.      Plan     Monitor response to tx and progress with ROM/flexibilty/strengthening PRN.  Check thoracic mobility next visit.    Melissa Loo, PT

## 2019-09-24 ENCOUNTER — CLINICAL SUPPORT (OUTPATIENT)
Dept: REHABILITATION | Facility: HOSPITAL | Age: 59
End: 2019-09-24
Payer: COMMERCIAL

## 2019-09-24 DIAGNOSIS — M25.511 RIGHT SHOULDER PAIN, UNSPECIFIED CHRONICITY: ICD-10-CM

## 2019-09-24 DIAGNOSIS — M25.60 DECREASED RANGE OF MOTION: ICD-10-CM

## 2019-09-24 PROCEDURE — 97014 ELECTRIC STIMULATION THERAPY: CPT

## 2019-09-24 PROCEDURE — 97140 MANUAL THERAPY 1/> REGIONS: CPT

## 2019-09-24 PROCEDURE — 97110 THERAPEUTIC EXERCISES: CPT

## 2019-09-24 NOTE — PROGRESS NOTES
Physical Therapy Daily Treatment Note     Name: Kilo Dailey  Clinic Number: [id number]    Therapy Diagnosis:   Encounter Diagnoses   Name Primary?    Right shoulder pain, unspecified chronicity     Decreased range of motion      Physician: Amilcar Washington MD    Visit Date: 9/24/2019    Physician Orders: PT Eval and Treat   Medical Diagnosis from Referral: Shoulder capsulitis, right; Compression fracture of C-spine, initial encounter  Evaluation Date: 8/23/2019  Authorization Period Expiration: 12/31/19  Plan of Care Expiration: 9/22/19  Visit # / Visits authorized: 10/20      Time In: 7:55am  Time Out: 8:50am  Total Billable Time: 54 minutes    Precautions: Standard    Subjective     Pt reports: Has been doing all of his HEP and feeling like his ROM is improving.  He is only having minimal pain at end ROM.    He was compliant with home exercise program.  Response to previous treatment: Increased motion  Functional change: None reported    Pain: NT/10  Location: right shoulder      Objective     Pelon received therapeutic exercises to develop strength, ROM, flexibility, posture and core stabilization for 34 minutes including:    UBE 4' BW for scapular endurance   t-spine ext over towel roll 3'  Foam roll stretch 3'  Pulleys ext 2'  Quadruped ER reach backs 10x/ea  Seated lat stretch 2'/10s  Reach, roll, no lift 2'/5s  Seated mat ER stretch 2'/10s  Prone LT isometrics 2'  Prone LT isometrics with lats, 2'/5s  Prone rows 2#, 2'  Supine cervical rot 2'  PROM R sh 5'    Pelon received the following manual therapy techniques: Joint mobilizations and Soft tissue Mobilization were applied to the: R upper quadrant for 10 minutes, including:  Subscap releases in neutral and at axillary area with sh flexion, lats, UT/levator scapula, medial and lateral scapular mm - with and without passive flexion of sh, pectoral mm, post delt, infraspinatus/teres minor, biceps, brachialis along with G/H jt mobs - inferior and post.   Manual pec minor stretch and releases.  Sub-occipital releases and SCM releases B.  Thoracic spine mobility in supported sitting and prone rib and thoracic mobility.    Pleon received the following supervised modalities after being cleared for contradictions: IFC Electrical Stimulation:  Kilo received IFC Electrical Stimulation for pain control applied to the C/T mm. Pt received stimulation at 40 % scan at a frequency of  for 10 minutes. Kilo tolerated treatment well without any adverse effects.    Pt also received MHP to Cervical and UT area x 10 min along with IFC.      Home Exercises Provided and Patient Education Provided     Education provided:   - Con't with HEP    Written Home Exercises Provided: Patient instructed to cont prior HEP.  Exercises were reviewed and Pelon was able to demonstrate them prior to the end of the session.  Pelon demonstrated good  understanding of the education provided.     See EMR under Patient Instructions for exercises provided prior visit.    Assessment     Con't with some pain in neck but feels like his shoulder is getting much better - just having pain at end range, but today noted close to full PROM felx, abd, and ER.    Pelon is progressing well towards his goals.   Pt prognosis is Good.     Pt will continue to benefit from skilled outpatient physical therapy to address the deficits listed in the problem list box on initial evaluation, provide pt/family education and to maximize pt's level of independence in the home and community environment.     Pt's spiritual, cultural and educational needs considered and pt agreeable to plan of care and goals.     Anticipated barriers to physical therapy: None    Goals:   Short Term Goals: In 3 weeks   1.Pt to be educated on HEP.  2.Patient to increase R PROM to WNL's.   4.Patient to have pain less than 6/10 at all times.  5.Patient to improve score by 5% on the Quick Dash.  6. Pt to be educated on postural and body mechanics  awareness.     Long Term Goals: In 6 weeks  1. Patient to improve score by 10% form IE on the Quick Dash.  3. Patient to have decreased pain to 3/10 at all times.  4. Patient to demo increase AROM R sh to WFL's without pain.  5. Patient to perform daily activities including reaching overhead, and washing his back without limitation.      Plan     Monitor response to tx and progress with ROM/flexibilty/strengthening PRN.  Progress note next visit.    Melissa Loo, PT

## 2019-09-30 ENCOUNTER — PATIENT MESSAGE (OUTPATIENT)
Dept: DERMATOLOGY | Facility: CLINIC | Age: 59
End: 2019-09-30

## 2019-09-30 RX ORDER — CLOBETASOL PROPIONATE 0.5 MG/G
AEROSOL, FOAM TOPICAL
Qty: 50 G | Refills: 3 | Status: SHIPPED | OUTPATIENT
Start: 2019-09-30 | End: 2019-10-03

## 2019-10-01 ENCOUNTER — CLINICAL SUPPORT (OUTPATIENT)
Dept: REHABILITATION | Facility: HOSPITAL | Age: 59
End: 2019-10-01
Payer: COMMERCIAL

## 2019-10-01 DIAGNOSIS — M25.60 DECREASED RANGE OF MOTION: ICD-10-CM

## 2019-10-01 DIAGNOSIS — M25.511 RIGHT SHOULDER PAIN, UNSPECIFIED CHRONICITY: ICD-10-CM

## 2019-10-01 PROCEDURE — 97014 ELECTRIC STIMULATION THERAPY: CPT

## 2019-10-01 PROCEDURE — 97110 THERAPEUTIC EXERCISES: CPT

## 2019-10-01 PROCEDURE — 97140 MANUAL THERAPY 1/> REGIONS: CPT

## 2019-10-01 NOTE — PROGRESS NOTES
Physical Therapy Daily Treatment Note     Name: Kilo Dailey  Clinic Number: [id number]    Therapy Diagnosis:   Encounter Diagnoses   Name Primary?    Right shoulder pain, unspecified chronicity     Decreased range of motion      Physician: Amilcar Washington MD    Visit Date: 10/1/2019    Physician Orders: PT Eval and Treat   Medical Diagnosis from Referral: Shoulder capsulitis, right; Compression fracture of C-spine, initial encounter  Evaluation Date: 8/23/2019  Authorization Period Expiration: 12/31/19  Plan of Care Expiration: 11/1/19  Visit # / Visits authorized: 11/20      Time In: 9:30 am  Time Out: 10:45 am  Total Billable Time: 59 minutes    Precautions: Standard    Subjective     Pt reports: See PN.    He was compliant with home exercise program.  Response to previous treatment: Increased motion  Functional change: None reported    Pain: 0-5/10  Location: right shoulder      Objective     Pelon received therapeutic exercises to develop strength, ROM, flexibility, posture and core stabilization for 34 minutes including:    UBE 4' BW for scapular endurance   t-spine ext over towel roll 3'  Foam roll stretch 3'  Pulleys ext 2'  Quadruped ER reach backs 10x/ea  Seated lat stretch 2'/10s  Reach, roll, no lift 2'/5s  Seated mat ER stretch 2'/10s  Prone LT isometrics 2'  Prone LT isometrics with lats, 2'/5s 1#  Reach backs ER 10x B  Prone rows 2#, 2'  Supine cervical rot 2'  PROM R sh 5'    Pelon received the following manual therapy techniques: Joint mobilizations and Soft tissue Mobilization were applied to the: R upper quadrant for 15 minutes, including:  Subscap releases in neutral and at axillary area with sh flexion, lats, UT/levator scapula, medial and lateral scapular mm - with and without passive flexion of sh, pectoral mm, post delt, infraspinatus/teres minor, biceps, brachialis along with G/H jt mobs - inferior and post.  Manual pec minor stretch and releases.  Sub-occipital releases and SCM  releases B.  Thoracic spine mobility, in prone R rib and upper thoracic mobility.    Pelon received the following supervised modalities after being cleared for contradictions: IFC Electrical Stimulation:  Kilo received IFC Electrical Stimulation for pain control applied to the C/T mm. Pt received stimulation at 40 % scan at a frequency of  for 10 minutes. Kilo tolerated treatment well without any adverse effects.    Pt also received MHP to Cervical and UT area x 10 min along with IFC.      Home Exercises Provided and Patient Education Provided     Education provided:   - Con't with HEP    Written Home Exercises Provided: Patient instructed to cont prior HEP.  Exercises were reviewed and Pelon was able to demonstrate them prior to the end of the session.  Pelon demonstrated good  understanding of the education provided.     See EMR under Patient Instructions for exercises provided prior visit.    Assessment     Con't to have pain at end range of motion of shoulder improved after cervical and thoracic along with manual to R shoulder.      Pelon is progressing well towards his goals.   Pt prognosis is Good.     Pt will continue to benefit from skilled outpatient physical therapy to address the deficits listed in the problem list box on initial evaluation, provide pt/family education and to maximize pt's level of independence in the home and community environment.     Pt's spiritual, cultural and educational needs considered and pt agreeable to plan of care and goals.     Anticipated barriers to physical therapy: None    Goals:   Short Term Goals: In 3 weeks   1.Pt to be educated on HEP. MET  2.Patient to increase R PROM to WNL's. Partially Met  4.Patient to have pain less than 6/10 at all times. MET  5.Patient to improve score by 5% on the Quick Dash. MET  6. Pt to be educated on postural and body mechanics awareness. MET     Long Term Goals: In 6 weeks  1. Patient to improve score by 10% form IE on the Quick  Dash. MET  3. Patient to have decreased pain to 3/10 at all times.  4. Patient to demo increase AROM R sh to WFL's without pain.  5. Patient to perform daily activities including reaching overhead, and washing his back without limitation.      Plan     Monitor response to tx and progress with ROM/flexibilty/strengthening PRN.    Melissa Loo, PT

## 2019-10-01 NOTE — PLAN OF CARE
Outpatient Therapy Updated Plan of Care     Visit Date: 10/1/2019  Name: Kilo Dailey  [facility] Number: [id number]    Therapy Diagnosis:   Encounter Diagnoses   Name Primary?    Right shoulder pain, unspecified chronicity     Decreased range of motion      Physician: Amilcar Washington MD      Physician Orders: PT Eval and Treat   Medical Diagnosis from Referral: Shoulder capsulitis, right; Compression fracture of C-spine, initial encounter  Evaluation Date: 8/23/2019  Authorization Period Expiration: 12/31/19  Plan of Care Expiration: 9/22/19  Visit # / Visits authorized: 11/20      Total Visits Received: 11  Cancelled Visits: 0  No Show Visits: 0    Current Certification Period:  8/23/19 to 9/22/19  Precautions:  standard  Visits from Evaluation Date:  11  Functional Level Prior to Evaluation:  Limited with use overhead and behind back, into back seat of car.    Subjective     Update: Pt rates pain 0/10 at rest and 5/10 at worst.  He reports some improvement with reaching overhead and in back of car, behind back - but still painful at end of available range.    Objective     Update:     Shoulder ROM:     Active/Passive Joint Range Right (at EVAL) Right   Flexion 125/145 150/160   ABDuction 100 p!/90 150/160   External Rotation R UT/60 T2/90 pain at end range   Internal Rotation Waist p!/70 Waist mild p!/ 72   Extension 40 48         Joint Mobility: Still with restricted inferior jt mobility R glenohumeral joint.       Function:  Quick DASH Shoulder Questionnaire           Score 1-5  1. Opening a tight jar       1  2. Do heavy household chores     3  3. Carry a shopping bag or briefcase     2  4. Wash your back      4  5. Use a knife to cut food     1  6. Recreactional activities requiring force   2  7. Social Limitation      2  8. Work/ADL limitation      2  9. Arm, Shoulder or hand Pain    2  10. Tingling       1  11. Sleeping Limitation      2    At evaluation= 38.6% score of the Quick DASH Shoulder  Questionnaire.  Today (10/1/19)=22.7%    Assessment     Update: Pt has decreased pain, increased A/PROM and improved functional reporting.      Previous Short Term Goals Status:       Short Term Goals: In 3 weeks   1.Pt to be educated on HEP. MET  2.Patient to increase R PROM to WNL's. Partially Met  4.Patient to have pain less than 6/10 at all times. MET  5.Patient to improve score by 5% on the Quick Dash. MET  6. Pt to be educated on postural and body mechanics awareness. MET     Long Term Goals: In 6 weeks  1. Patient to improve score by 10% form IE on the Quick Dash. MET  3. Patient to have decreased pain to 3/10 at all times.  4. Patient to demo increase AROM R sh to WFL's without pain.  5. Patient to perform daily activities including reaching overhead, and washing his back without limitation.        New Short Term Goals Status:   Con't towards all goals established at IE not yet met  Long Term Goal Status:   continue per initial plan of care.  Reasons for Recertification of Therapy:   Con't progression     Plan     Updated Certification Period: 10/1/2019 to 11/1/19  Recommended Treatment Plan: 1 times per week for 6 weeks: Electrical Stimulation PRN, Manual Therapy, Moist Heat/ Ice, Neuromuscular Re-ed, Patient Education, Self Care, Therapeutic Activites, Therapeutic Exercise and FDN/PRN  Other Recommendations: None    Melissa Loo, PT  10/1/2019      I CERTIFY THE NEED FOR THESE SERVICES FURNISHED UNDER THIS PLAN OF TREATMENT AND WHILE UNDER MY CARE    Physician's comments:        Physician's Signature: ___________________________________________________

## 2019-10-02 DIAGNOSIS — M77.8 TENDINITIS OF RIGHT SHOULDER: Primary | ICD-10-CM

## 2019-10-02 DIAGNOSIS — S12.9XXA: ICD-10-CM

## 2019-10-03 DIAGNOSIS — L21.9 SEBORRHEIC DERMATITIS: Primary | ICD-10-CM

## 2019-10-03 RX ORDER — CLOBETASOL PROPIONATE 0.46 MG/ML
SOLUTION TOPICAL
Qty: 50 ML | Refills: 3 | Status: SHIPPED | OUTPATIENT
Start: 2019-10-03 | End: 2020-08-24 | Stop reason: SDUPTHER

## 2019-10-08 ENCOUNTER — CLINICAL SUPPORT (OUTPATIENT)
Dept: REHABILITATION | Facility: HOSPITAL | Age: 59
End: 2019-10-08
Payer: COMMERCIAL

## 2019-10-08 DIAGNOSIS — M25.60 DECREASED RANGE OF MOTION: ICD-10-CM

## 2019-10-08 DIAGNOSIS — M25.511 RIGHT SHOULDER PAIN, UNSPECIFIED CHRONICITY: ICD-10-CM

## 2019-10-08 PROCEDURE — 97014 ELECTRIC STIMULATION THERAPY: CPT

## 2019-10-08 PROCEDURE — 97110 THERAPEUTIC EXERCISES: CPT

## 2019-10-08 PROCEDURE — 97140 MANUAL THERAPY 1/> REGIONS: CPT

## 2019-10-14 NOTE — PROGRESS NOTES
Physical Therapy Daily Treatment Note     Name: Kilo Dailey  Clinic Number: [id number]    Therapy Diagnosis:   Encounter Diagnoses   Name Primary?    Right shoulder pain, unspecified chronicity     Decreased range of motion      Physician: Amilcar Washington MD    Visit Date: 10/8/2019    Physician Orders: PT Eval and Treat   Medical Diagnosis from Referral: Shoulder capsulitis, right; Compression fracture of C-spine, initial encounter  Evaluation Date: 8/23/2019  Authorization Period Expiration: 12/31/19  Plan of Care Expiration: 11/1/19  Visit # / Visits authorized: 11/20      Time In: 8:30 am  Time Out: 9:45 am  Total Billable Time: 38 minutes    Precautions: Standard    Subjective     Pt reports: Has been doing HEP, still having pain, discussed increased tone today and pt reports that he was doing some work hanging some cabinets - that may have stressed shoulder more then normal everyday activities.      He was compliant with home exercise program.  Response to previous treatment: Increased motion  Functional change: None reported    Pain: 0-5/10  Location: right shoulder      Objective     Pelon received therapeutic exercises to develop strength, ROM, flexibility, posture and core stabilization for 37 minutes including:    UBE 4' BW for scapular endurance   Levator scap RB roll 2', ea side  t-spine ext over towel roll 3'  Foam roll stretch 3'  Pulleys ext 2'  Quadruped ER reach backs 10x/ea  Seated lat stretch 2'/10s  Reach, roll, no lift 2'/5s  Seated mat ER stretch 2'/10s  Prone LT isometrics 2'  Prone LT isometrics with lats, 2'/5s 1#  Prone rows 2#, 2'  Supine cervical rot 2'  PROM R sh 5'    Pelon received the following manual therapy techniques: Joint mobilizations and Soft tissue Mobilization were applied to the: R upper quadrant for 15 minutes, including:  Subscap releases in neutral and at axillary area with sh flexion, lats, UT/levator scapula, medial and lateral scapular mm - with and without  passive flexion of sh, pectoral mm, post delt, infraspinatus/teres minor, biceps, brachialis along with G/H jt mobs - inferior and post.  Manual pec minor stretch and releases.  Sub-occipital releases and SCM releases B.  Thoracic spine mobility, in prone R ribs and upper thoracic mobility.    Pelon received the following supervised modalities after being cleared for contradictions: IFC Electrical Stimulation:  Kilo received IFC Electrical Stimulation for pain control applied to the C/T mm. Pt received stimulation at 40 % scan at a frequency of  for 10 minutes. Kilo tolerated treatment well without any adverse effects.    Pt also received MHP to Cervical and UT area x 10 min along with IFC.      Home Exercises Provided and Patient Education Provided     Education provided:   - Con't with HEP    Written Home Exercises Provided: Patient instructed to cont prior HEP.  Exercises were reviewed and Pelon was able to demonstrate them prior to the end of the session.  Pelon demonstrated good  understanding of the education provided.     See EMR under Patient Instructions for exercises provided prior visit.    Assessment     Con't to have pain at end range of motion of shoulder, increased mm tone throughout upper quadrant today,  improved after manual to R shoulder/cervical/thoracic.      Pelon is progressing well towards his goals.   Pt prognosis is Good.     Pt will continue to benefit from skilled outpatient physical therapy to address the deficits listed in the problem list box on initial evaluation, provide pt/family education and to maximize pt's level of independence in the home and community environment.     Pt's spiritual, cultural and educational needs considered and pt agreeable to plan of care and goals.     Anticipated barriers to physical therapy: None    Goals:   Short Term Goals: In 3 weeks   1.Pt to be educated on HEP. MET  2.Patient to increase R PROM to WNL's. Partially Met  4.Patient to have  pain less than 6/10 at all times. MET  5.Patient to improve score by 5% on the Quick Dash. MET  6. Pt to be educated on postural and body mechanics awareness. MET     Long Term Goals: In 6 weeks  1. Patient to improve score by 10% form IE on the Quick Dash. MET  3. Patient to have decreased pain to 3/10 at all times.  4. Patient to demo increase AROM R sh to WFL's without pain.  5. Patient to perform daily activities including reaching overhead, and washing his back without limitation.      Plan     Monitor response to tx and progress with ROM/flexibilty/strengthening PRN.    Melissa Loo, PT

## 2019-10-15 ENCOUNTER — CLINICAL SUPPORT (OUTPATIENT)
Dept: REHABILITATION | Facility: HOSPITAL | Age: 59
End: 2019-10-15
Payer: COMMERCIAL

## 2019-10-15 DIAGNOSIS — M25.511 RIGHT SHOULDER PAIN, UNSPECIFIED CHRONICITY: ICD-10-CM

## 2019-10-15 DIAGNOSIS — M25.60 DECREASED RANGE OF MOTION: ICD-10-CM

## 2019-10-15 PROCEDURE — 97140 MANUAL THERAPY 1/> REGIONS: CPT

## 2019-10-15 PROCEDURE — 97110 THERAPEUTIC EXERCISES: CPT

## 2019-10-16 NOTE — PLAN OF CARE
Outpatient Therapy Discharge Summary     Name: Kilo Dailey  [facility] Number: [id number]    Therapy Diagnosis:   Encounter Diagnoses   Name Primary?    Right shoulder pain, unspecified chronicity     Decreased range of motion      Physician: Amilcar Washington MD    Physician Orders: PT Eval and Treat   Medical Diagnosis from Referral: Shoulder capsulitis, right; Compression fracture of C-spine, initial encounter  Evaluation Date: 8/23/2019  Authorization Period Expiration: 12/31/19  Plan of Care Expiration: 11/1/19  Visit # / Visits authorized: 12/20      Date of Last visit: 10/15/19  Total Visits Received: 12  Cancelled Visits: 0  No Show Visits: 0    Assessment      Goals:   Short Term Goals: In 3 weeks   1.Pt to be educated on HEP. MET  2.Patient to increase R PROM to WNL's. Partially Met  4.Patient to have pain less than 6/10 at all times. MET  5.Patient to improve score by 5% on the Quick Dash. MET  6. Pt to be educated on postural and body mechanics awareness. MET     Long Term Goals: In 6 weeks  1. Patient to improve score by 10% form IE on the Quick Dash. MET  3. Patient to have decreased pain to 3/10 at all times.  4. Patient to demo increase AROM R sh to WFL's without pain.  5. Patient to perform daily activities including reaching overhead, and washing his back without limitation.        Discharge reason: Patient requested discharge    Plan   This patient is discharged from Physical Therapy

## 2019-10-16 NOTE — PROGRESS NOTES
Physical Therapy Daily Treatment Note     Name: Kilo Dailey  [facility] Number: [id number]    Therapy Diagnosis:   Encounter Diagnoses   Name Primary?    Right shoulder pain, unspecified chronicity     Decreased range of motion      Physician: Amilcar Washington MD    Visit Date: 10/15/2019    Physician Orders: PT Eval and Treat   Medical Diagnosis from Referral: Shoulder capsulitis, right; Compression fracture of C-spine, initial encounter  Evaluation Date: 8/23/2019  Authorization Period Expiration: 12/31/19  Plan of Care Expiration: 11/1/19  Visit # / Visits authorized: 12/20      Time In: 8:30 am  Time Out: 9:45 am  Total Billable Time: 50 minutes    Precautions: Standard    Subjective     Pt reports: Has been doing HEP, able to do most things that he wants and is feeling like his shoulder is much better.  Due to insurance deductible he is requesting DC to self care today.  Will Follow back up if he notes a decrease in any of his available range. His neck is still stiff and sore at times but isn't really giving him any problems.    He was compliant with home exercise program.  Response to previous treatment: Increased motion  Functional change: None reported    Pain: 0-5/10  Location: right shoulder      Objective     Pelon received therapeutic exercises to develop strength, ROM, flexibility, posture and core stabilization for 40 minutes including:    UBE 4' BW for scapular endurance   Levator scap RB roll 2', ea side  t-spine ext over towel roll 3'  Foam roll stretch 3'  Foam rolling t-spine 3'  Pulleys ext 2'  Quadruped ER reach backs 10x/ea  Seated lat stretch 2'/10s  Reach, roll, no lift 2'/5s  Seated mat ER stretch 2'/10s  Prone LT isometrics 2'  Prone LT isometrics with lats, 2'/5s 1#  Prone rows 2#, 2'  Supine cervical rot 2'  PROM R sh 5'    Pelon received the following manual therapy techniques: Joint mobilizations and Soft tissue Mobilization were applied to the: R upper quadrant for 10 minutes,  including:  Subscap releases in neutral and at axillary area with sh flexion, lats, UT/levator scapula, medial and lateral scapular mm - with and without passive flexion of sh, pectoral mm, post delt, infraspinatus/teres minor, .  Manual pec minor stretch and releases.  Sub-occipital releases and SCM releases B.       Home Exercises Provided and Patient Education Provided     Education provided:   - Con't with HEP    Written Home Exercises Provided: Patient instructed to cont prior HEP.  Exercises were reviewed and Pelon was able to demonstrate them prior to the end of the session.  Pelon demonstrated good  understanding of the education provided.     See EMR under Patient Instructions for exercises provided prior visit.    Assessment     Although painful at end range pt with full ER/IR and close to full flexion and ABD.    Pelon is progressing well towards his goals.   Pt prognosis is Good.     Pt will continue to benefit from skilled outpatient physical therapy to address the deficits listed in the problem list box on initial evaluation, provide pt/family education and to maximize pt's level of independence in the home and community environment.     Pt's spiritual, cultural and educational needs considered and pt agreeable to plan of care and goals.     Anticipated barriers to physical therapy: None    Goals:   Short Term Goals: In 3 weeks   1.Pt to be educated on HEP. MET  2.Patient to increase R PROM to WNL's. Partially Met  4.Patient to have pain less than 6/10 at all times. MET  5.Patient to improve score by 5% on the Quick Dash. MET  6. Pt to be educated on postural and body mechanics awareness. MET     Long Term Goals: In 6 weeks  1. Patient to improve score by 10% form IE on the Quick Dash. MET  3. Patient to have decreased pain to 3/10 at all times.  4. Patient to demo increase AROM R sh to WFL's without pain.  5. Patient to perform daily activities including reaching overhead, and washing his back without  limitation.      Plan     DC to self care per pt request.    Melissa Loo, PT

## 2019-10-23 RX ORDER — ATORVASTATIN CALCIUM 10 MG/1
TABLET, FILM COATED ORAL
Qty: 90 TABLET | Refills: 1 | Status: SHIPPED | OUTPATIENT
Start: 2019-10-23 | End: 2021-01-04

## 2020-01-27 ENCOUNTER — OFFICE VISIT (OUTPATIENT)
Dept: INTERNAL MEDICINE | Facility: CLINIC | Age: 60
End: 2020-01-27
Payer: COMMERCIAL

## 2020-01-27 VITALS
WEIGHT: 153.25 LBS | SYSTOLIC BLOOD PRESSURE: 110 MMHG | HEART RATE: 75 BPM | TEMPERATURE: 97 F | BODY MASS INDEX: 20.76 KG/M2 | HEIGHT: 72 IN | DIASTOLIC BLOOD PRESSURE: 64 MMHG | OXYGEN SATURATION: 99 %

## 2020-01-27 DIAGNOSIS — H00.014 HORDEOLUM EXTERNUM OF LEFT UPPER EYELID: Primary | ICD-10-CM

## 2020-01-27 PROCEDURE — 3008F PR BODY MASS INDEX (BMI) DOCUMENTED: ICD-10-PCS | Mod: CPTII,S$GLB,, | Performed by: PHYSICIAN ASSISTANT

## 2020-01-27 PROCEDURE — 99214 PR OFFICE/OUTPT VISIT, EST, LEVL IV, 30-39 MIN: ICD-10-PCS | Mod: S$GLB,,, | Performed by: PHYSICIAN ASSISTANT

## 2020-01-27 PROCEDURE — 99999 PR PBB SHADOW E&M-EST. PATIENT-LVL III: ICD-10-PCS | Mod: PBBFAC,,, | Performed by: PHYSICIAN ASSISTANT

## 2020-01-27 PROCEDURE — 3008F BODY MASS INDEX DOCD: CPT | Mod: CPTII,S$GLB,, | Performed by: PHYSICIAN ASSISTANT

## 2020-01-27 PROCEDURE — 99214 OFFICE O/P EST MOD 30 MIN: CPT | Mod: S$GLB,,, | Performed by: PHYSICIAN ASSISTANT

## 2020-01-27 PROCEDURE — 99999 PR PBB SHADOW E&M-EST. PATIENT-LVL III: CPT | Mod: PBBFAC,,, | Performed by: PHYSICIAN ASSISTANT

## 2020-01-27 RX ORDER — ERYTHROMYCIN 5 MG/G
OINTMENT OPHTHALMIC EVERY 8 HOURS
Qty: 3.5 G | Refills: 0 | Status: SHIPPED | OUTPATIENT
Start: 2020-01-27 | End: 2020-02-03

## 2020-01-27 NOTE — PROGRESS NOTES
Subjective:      Patient ID: Kilo Dailey is a 59 y.o. male.    Chief Complaint: Belepharitis (3 days)    Eye Problem    The left eye is affected. This is a new problem. Episode onset: 3 days. The problem occurs constantly. The problem has been unchanged. There was no injury mechanism. The patient is experiencing no pain. Associated symptoms include an eye discharge, eye redness, a foreign body sensation and itching. Pertinent negatives include no blurred vision, double vision, fever, nausea, photophobia, recent URI, vomiting or weakness. Treatments tried: warm compresses. The treatment provided no relief.       Review of Systems   Constitutional: Negative for activity change, appetite change, chills, diaphoresis, fatigue, fever and unexpected weight change.   HENT: Negative.  Negative for congestion, hearing loss, postnasal drip, rhinorrhea, sore throat, trouble swallowing and voice change.    Eyes: Positive for discharge, redness and itching. Negative for blurred vision, double vision, photophobia, pain and visual disturbance.   Respiratory: Negative.  Negative for cough, choking, chest tightness and shortness of breath.    Cardiovascular: Negative for chest pain, palpitations and leg swelling.   Gastrointestinal: Negative for abdominal distention, abdominal pain, blood in stool, constipation, diarrhea, nausea and vomiting.   Endocrine: Negative for cold intolerance, heat intolerance, polydipsia and polyuria.   Genitourinary: Negative.  Negative for difficulty urinating and frequency.   Musculoskeletal: Negative for arthralgias, back pain, gait problem, joint swelling and myalgias.   Skin: Negative for color change, pallor, rash and wound.   Neurological: Negative for dizziness, tremors, weakness, light-headedness, numbness and headaches.   Hematological: Negative for adenopathy.   Psychiatric/Behavioral: Negative for behavioral problems, confusion, self-injury, sleep disturbance and suicidal ideas. The  patient is not nervous/anxious.      Objective:   /64 (BP Location: Left arm, Patient Position: Sitting, BP Method: Medium (Manual))   Pulse 75   Temp 97.4 °F (36.3 °C) (Tympanic)   Ht 6' (1.829 m)   Wt 69.5 kg (153 lb 3.5 oz)   SpO2 99%   BMI 20.78 kg/m²     Physical Exam   Constitutional: He appears well-developed and well-nourished. No distress.   HENT:   Head: Normocephalic and atraumatic.   Eyes: Pupils are equal, round, and reactive to light. Conjunctivae and EOM are normal. Lids are everted and swept, no foreign bodies found. Right eye exhibits no chemosis, no discharge, no exudate and no hordeolum. No foreign body present in the right eye. Left eye exhibits hordeolum. Left eye exhibits no chemosis, no discharge and no exudate. No foreign body present in the left eye.   Cardiovascular: Normal rate, regular rhythm and normal heart sounds. Exam reveals no gallop and no friction rub.   No murmur heard.  Pulmonary/Chest: Effort normal and breath sounds normal. No stridor. No respiratory distress. He has no wheezes.   Skin: He is not diaphoretic.   Nursing note and vitals reviewed.      Assessment:     1. Hordeolum externum of left upper eyelid      Plan:   Hordeolum externum of left upper eyelid  -     erythromycin (ROMYCIN) ophthalmic ointment; Place into the left eye every 8 (eight) hours. for 7 days  Dispense: 3.5 g; Refill: 0    -warm compresses  Educational handout on over-the-counter medications and at-home conservative care, pertinent to the patients diagnosis today, was handed to the patient and discussed in detail.    Follow up if symptoms worsen or fail to improve.

## 2020-01-27 NOTE — PATIENT INSTRUCTIONS
Sty (or Stye)  A sty is an infection of the oil gland of the eyelid. It may develop into a small pocket of pus (abscess). This can cause pain, redness, and swelling. In early stages, styes are treated with antibiotic cream, eye drops, or warm packs (small towels soaked in warm water). More severe cases may need to be opened and drained by a health care provider.  Home care  · Eye drops or ointment are usually prescribed to treat the infection. Use these as directed.   ¨ Artificial tears may also be used to lubricate the eye and make it more comfortable. These may be purchased without a prescription.   ¨ Talk to your health care provider before using any over-the-counter treatment for a sty.  · Apply a warm, damp towel to the affected eye for at least 5 minutes, 3 to 4 times a day for a week. Warm compresses open the pores and speed the healing. If the compresses are too hot, they may burn your eyelid.  · Sometimes the sty will drain with this treatment alone. If this happens, continue the antibiotic until all the redness and swelling are gone.  · Wash your hands before and after touching the infected eye to avoid spreading the infection.  · Do not squeeze or try to puncture the sty.  Follow-up care  Follow up with your health care provider, or as advised.   When to seek medical advice  Call your health care provider right away if you have:  · Increase in swelling or redness around the eyelid after 48 to 72 hours  · Increase in eye pain or the eyelid blisters  · Increase in warmth--the eyelid feels hot  · Drainage of blood or thick pus from the sty  · Blister on the eyelid  · Inability to open the eyelid due to swelling  · Fever  ¨ 1 degree above your normal temperature lasting for 24 to 48 hours, or  ¨ Whatever your health care provider told you to report based on your medical condition  · Vision changes  · Headache or stiff neck  · Recurrence of the sty  Date Last Reviewed: 6/14/2015  © 2903-4105 [employer] [address] [place] [address], [place] [address]. All rights reserved. This information is not intended as a substitute for professional medical care. Always follow your healthcare professional's instructions.

## 2020-03-28 ENCOUNTER — OFFICE VISIT (OUTPATIENT)
Dept: INTERNAL MEDICINE | Facility: CLINIC | Age: 60
End: 2020-03-28
Attending: FAMILY MEDICINE
Payer: COMMERCIAL

## 2020-03-28 ENCOUNTER — NURSE TRIAGE (OUTPATIENT)
Dept: ADMINISTRATIVE | Facility: CLINIC | Age: 60
End: 2020-03-28

## 2020-03-28 ENCOUNTER — PATIENT MESSAGE (OUTPATIENT)
Dept: INTERNAL MEDICINE | Facility: CLINIC | Age: 60
End: 2020-03-28

## 2020-03-28 DIAGNOSIS — R05.9 COUGH: Primary | ICD-10-CM

## 2020-03-28 DIAGNOSIS — R50.9 LOW GRADE FEVER: ICD-10-CM

## 2020-03-28 PROCEDURE — 99213 OFFICE O/P EST LOW 20 MIN: CPT | Mod: 95,,, | Performed by: FAMILY MEDICINE

## 2020-03-28 PROCEDURE — 99213 PR OFFICE/OUTPT VISIT, EST, LEVL III, 20-29 MIN: ICD-10-PCS | Mod: 95,,, | Performed by: FAMILY MEDICINE

## 2020-03-28 NOTE — PATIENT INSTRUCTIONS
This is a rapidly evolving, confusing and scary time. We have to consider our typical coughs, colds, allergy and flu along with the new COVID-19. COVID-19 can cause a range of symptoms from none to severe. I'll provide some general guidance:     We recommend that you watch for fever above 100.4, shortness of breath, or overall deterioration.  Those would be indications to seek care.  Take Tylenol for fever.  Isolate yourself.  Here is a web site for some general information from the CDC.     https://www.cdc.gov/coronavirus/2019-ncov/index.html    [facility] has created a hotline for guidance and COVID-19 information to help patients with general questions and concerns. The hotline will be staffed by patient navigators who can assist with non-clinical questions. Please feel free to call the hotline at 1-165.421.5505.     The [facility] On Call line at [phone] or [phone] for immediate assistance and guidance on whether an in-person visit is needed. [facility] On Call is a free service providing appointment booking, health education and advisory services and is available 24 hours a day, 7 days a week.    Also, we have ramped up our Telemedicine visits, and we can set one up easily.     This is a situation that is rapidly changing as we learn more. Let me know how we can help.  Thank you

## 2020-03-28 NOTE — PROGRESS NOTES
Subjective:       Patient ID: Kilo Dailey is a 59 y.o. male.    Chief Complaint: No chief complaint on file.    The patient location is:  Home  The chief complaint leading to consultation is:  Cough and low-grade fever  Visit type: Virtual visit with synchronous audio and video  Total time spent with patient:  10 min  Each patient to whom he or she provides medical services by telemedicine is:  (1) informed of the relationship between the physician and patient and the respective role of any other health care provider with respect to management of the patient; and (2) notified that he or she may decline to receive medical services by telemedicine and may withdraw from such care at any time.    Notes:  Patient called an urgent care center today for advice.  He states he has a low-grade fever in the 99 range with a dry cough.  Three days.  No shortness of breath.  No other significant symptoms such as nausea, vomiting, myalgias.  Problem list reviewed.  No major medical history.    Review of Systems   Constitutional: Positive for fever. Negative for fatigue.   HENT: Negative for congestion.    Respiratory: Positive for cough. Negative for shortness of breath, wheezing and stridor.    Cardiovascular: Negative for chest pain, palpitations and leg swelling.   Gastrointestinal: Negative for abdominal distention, abdominal pain, diarrhea, nausea and vomiting.   Musculoskeletal: Negative for myalgias.       Objective:      Physical Exam    Assessment:       1. Cough    2. Low grade fever        Plan:     Medication List with Changes/Refills   Current Medications    ATORVASTATIN (LIPITOR) 10 MG TABLET    TAKE 1 TABLET BY MOUTH ONCE DAILY    CLOBETASOL (TEMOVATE) 0.05 % EXTERNAL SOLUTION    Apply to affected areas of scalp 1-2 times daily as needed for itch    FLUTICASONE PROPIONATE (CUTIVATE) 0.05 % CREAM    Apply topically 2 (two) times daily.    GLUCOSAMINE-CHONDROITIN 500-400 MG TABLET    Take 1 tablet by mouth once  daily.    KETOCONAZOLE (NIZORAL) 2 % SHAMPOO    Apply to scalp & effected dampened skin areas, lather and leave on x 5 min, then rinse off.  Use daily x 3 days.    KETOCONAZOLE (NIZORAL) 2 % SHAMPOO    Wash hair with medicated shampoo at least 2x/week - let sit on scalp at least 5 minutes prior to rinsing    KETOCONAZOLE 2 % FOAM    AAA BID for rash on face, chest and back.  For seborrheic dermatitis and tinea versicolor.    TRIAMCINOLONE ACETONIDE 0.025% (KENALOG) 0.025 % CREAM    Apply topically 2 (two) times daily as needed. Safe for use on face.  Use for < 2 weeks then stop.     Diagnoses and all orders for this visit:    Cough    Low grade fever      See meds, orders, follow up, routing and instructions sections of encounter and AVS. Discussed with patient and provided on AVS.    Explained that current symptoms could be consistent with a corona virus infection.  Explained that there is a wide range of presentation, both symptoms and severity.  In general the things that we worry about are progressive shortness of breath, fever above 100.4, general deterioration, nausea/vomiting/diarrhea to the point of inability to keep liquids down.  General fever recommendation is Tylenol, but the CDC has said that Advil is an acceptable alternative.  Will provide COVID information on AVS including the call in numbers.  Recommend isolation at home, social distancing.  Should situation worsen, seek emergent or urgent care.

## 2020-03-28 NOTE — TELEPHONE ENCOUNTER
Reason for Disposition   General information question, no triage required and triager able to answer question    Protocols used: INFORMATION ONLY CALL-A-AH    Pt stated he was told to contact COVID 19/[facility] line to discuss COVID testing with a MD. Pt stated he has a low grade fever and a dry cough for 2 weeks. Pt warm transferred to [facility] to assist with virtual visit.

## 2020-03-30 NOTE — TELEPHONE ENCOUNTER
Patient said his symptoms didn't qualify for Covid-19 testing.   His fever has subsided. He has a head cold, minor cough, and has no sense of taste or smell  Video visit scheduled

## 2020-03-31 ENCOUNTER — OFFICE VISIT (OUTPATIENT)
Dept: FAMILY MEDICINE | Facility: CLINIC | Age: 60
End: 2020-03-31
Payer: COMMERCIAL

## 2020-03-31 VITALS — WEIGHT: 153 LBS | HEIGHT: 72 IN | BODY MASS INDEX: 20.72 KG/M2

## 2020-03-31 DIAGNOSIS — R50.9 FEBRILE ILLNESS: ICD-10-CM

## 2020-03-31 DIAGNOSIS — J06.9 ACUTE URI: Primary | ICD-10-CM

## 2020-03-31 PROCEDURE — 3008F BODY MASS INDEX DOCD: CPT | Mod: CPTII,S$GLB,, | Performed by: FAMILY MEDICINE

## 2020-03-31 PROCEDURE — 99213 PR OFFICE/OUTPT VISIT, EST, LEVL III, 20-29 MIN: ICD-10-PCS | Mod: 95,,, | Performed by: FAMILY MEDICINE

## 2020-03-31 PROCEDURE — 99213 OFFICE O/P EST LOW 20 MIN: CPT | Mod: 95,,, | Performed by: FAMILY MEDICINE

## 2020-03-31 PROCEDURE — 3008F PR BODY MASS INDEX (BMI) DOCUMENTED: ICD-10-PCS | Mod: CPTII,S$GLB,, | Performed by: FAMILY MEDICINE

## 2020-03-31 NOTE — PROGRESS NOTES
The patient location is: at home  The chief complaint leading to consultation is:  Fever and respiratory symptoms  Visit type: Virtual visit with synchronous audio and video  Total time spent with patient:  15 min  Each patient to whom he or she provides medical services by telemedicine is:  (1) informed of the relationship between the physician and patient and the respective role of any other health care provider with respect to management of the patient; and (2) notified that he or she may decline to receive medical services by telemedicine and may withdraw from such care at any time.      CHIEF COMPLAINT:  This is a 59-year-old male complaining of respiratory illness.    SUBJECTIVE:  Patient began running low-grade fever 5 days ago with a maximum temperature of a 101.2° 48 hr ago.  Patient has been without fever for 24 hr.  He complains of dry cough, nasal congestion, loss of  sense of taste and smell.  He denies sore throat, earache, chest congestion, shortness of breath or wheezing.  Positive ill contacts.  Patient did not receive influenza vaccine.  He  took Tylenol for fever and has been taking vitamin-C and multivitamin daily.    ROS:  GENERAL: Patient denies night sweats.  Patient denies weight gain or loss. Patient denies anorexia, fatigue, weakness or swollen glands. positive for fever and chills.  SKIN: Patient denies rash.  HEENT: Patient denies sore throat, ear pain, hearing loss, or runny nose. Patient denies visual disturbance, eye irritation or discharge.  Positive for nasal congestion.  LUNGS: Patient denies wheeze or hemoptysis.  Positive for cough.  CARDIOVASCULAR: Patient denies chest pain, shortness of breath, palpitations, syncope or lower extremity edema.  GI: Patient denies abdominal pain, nausea, vomiting, diarrhea, constipation, blood in stool or melena.    OBJECTIVE:   GENERAL: Well-developed well-nourished, white male alert and oriented x3 in no acute distress. Memory, judgment and  cognition without deficit. No audible wheezing.  SKIN: Clear without rash. Normal color and tone.  HEENT: Eyes: Clear conjunctivae. No scleral icterus.  Nose:  Slightly congested-sounding.  NECK: Supple, normal range of motion.  LUNGS:  Normal respiratory effort.  EXTREMITIES:  Normal range of motion in all extremities.  NEUROLOGIC:  Gait without abnormality.  No tremor.    ASSESSMENT:  1. Acute URI    2. Febrile illness      PLAN:   1.  Symptomatic treatment.  2.  Keep well hydrated.  3.  Follow-up if no improvement or worsening symptoms.    This note is generated with speech recognition software and is subject to transcription error and sound alike phrases that may be missed by proofreading.

## 2020-05-20 ENCOUNTER — OFFICE VISIT (OUTPATIENT)
Dept: DERMATOLOGY | Facility: CLINIC | Age: 60
End: 2020-05-20
Payer: COMMERCIAL

## 2020-05-20 DIAGNOSIS — L21.9 SEBORRHEIC DERMATITIS: Primary | ICD-10-CM

## 2020-05-20 DIAGNOSIS — L71.9 ROSACEA: ICD-10-CM

## 2020-05-20 PROCEDURE — 99213 OFFICE O/P EST LOW 20 MIN: CPT | Mod: S$GLB,,, | Performed by: DERMATOLOGY

## 2020-05-20 PROCEDURE — 99999 PR PBB SHADOW E&M-EST. PATIENT-LVL II: ICD-10-PCS | Mod: PBBFAC,,, | Performed by: DERMATOLOGY

## 2020-05-20 PROCEDURE — 99213 PR OFFICE/OUTPT VISIT, EST, LEVL III, 20-29 MIN: ICD-10-PCS | Mod: S$GLB,,, | Performed by: DERMATOLOGY

## 2020-05-20 PROCEDURE — 99999 PR PBB SHADOW E&M-EST. PATIENT-LVL II: CPT | Mod: PBBFAC,,, | Performed by: DERMATOLOGY

## 2020-05-20 RX ORDER — ERYTHROMYCIN 20 MG/G
GEL TOPICAL
Qty: 60 G | Refills: 3 | Status: SHIPPED | OUTPATIENT
Start: 2020-05-20 | End: 2022-12-13 | Stop reason: SDUPTHER

## 2020-05-20 NOTE — PATIENT INSTRUCTIONS
Preventing Skin Cancer  Relaxing in the sun may feel good. But it isnt good for your skin. In fact, being exposed to the suns harmful rays is a major cause of skin cancer. This is a serious disease that can be life-threatening. People of all ages and backgrounds are at risk. But in most cases, skin cancer can be prevented.    Your Role in Prevention  You can act today to help prevent skin cancer. Start by avoiding the suns UV (ultraviolet) rays. And dont use tanning beds, which are no safer than the sun. Taking these steps can help keep you from getting skin cancer. It can also help prevent wrinkles and other sun-induced aging effects. Make sure your children also follow these safeguards. Now is the time to start taking preventive steps against skin cancer.  When You Are Outdoors  Protect your skin when you go outdoors during the day. Take precautions whenever you go out to eat, run errands by car or on foot, or do any outdoor activity. There isnt just one easy way to protect your skin. Its best to follow all of these steps:  · Wear tightly woven clothing that covers your skin. Put on a wide-brimmed hat to protect your face, ears, and scalp.  · Watch the clock. Try to avoid the sun between 10 a.m. and 4 p.m., when it is strongest.  · Head for the shade or create your own. Use an umbrella when sitting or strolling.  · Know that the suns rays can reflect off sand, water, and snow. This can harm your skin. Take extra care when you are near reflective surfaces.  · Keep in mind that even when the weather is hazy or cloudy, your skin can be exposed to strong UV rays.  · Shield your skin with sunscreen. Also, apply sunscreen to your childrens skin.  Tips for Using Sunscreen  To help prevent skin cancer, choose the right sunscreen and use it correctly. Try the following tips:  · Choose a sunscreen that has a sun protection factor (SPF) of at least 15. For the best protection, an SPF of at least 30 is preferred.  Also, choose a sunscreen labeled broad spectrum. This will shield you from both UVA and UVB (ultraviolet A and B) rays.  · If one brand irritates your skin, try another, particularly ones without fragrance.  · Use a water-resistant sunscreen if swimming or sweating.  · Reapply sunscreen every 2 hours. If youre active, do this more often.  · Cover any sun-exposed skin, from your face to your feet. Dont forget your ears and your lips.  · Know that while sunscreen helps protect you, it isnt enough. You should also wear protective clothing. And try to stay out of the sun as much as you can, especially from 10 a.m. to 4 p.m.  © 2321-9498 [employer]. [address] [place] [address], [place] [address]. All rights reserved. This information is not intended as a substitute for professional medical care. Always follow your healthcare professional's instructions.

## 2020-05-20 NOTE — PROGRESS NOTES
Subjective:       Patient ID:  Kilo Dailey is a 59 y.o. male who presents for   Chief Complaint   Patient presents with    Skin Check     dandruff and redness to face      Hx of seb derm and tinea versicoor, last seen on 7/11/19.  For seb derm, he uses TAC 0.025% cream, clobetasol foam and solution prn.  + improvement.      Review of Systems   Constitutional: Negative for fever and chills.   Gastrointestinal: Negative for nausea and vomiting.   Skin: Positive for itching, rash and dry skin. Negative for daily sunscreen use, activity-related sunscreen use and recent sunburn.   Hematologic/Lymphatic: Does not bruise/bleed easily.        Objective:    Physical Exam   Constitutional: He appears well-developed and well-nourished. No distress.   Neurological: He is alert and oriented to person, place, and time. He is not disoriented.   Psychiatric: He has a normal mood and affect.   Skin:   Areas Examined (abnormalities noted in diagram):   Scalp / Hair Palpated and Inspected  Head / Face Inspection Performed  Neck Inspection Performed  Chest / Axilla Inspection Performed  Abdomen Inspection Performed  Back Inspection Performed  RUE Inspected  LUE Inspection Performed  Nails and Digits Inspection Performed              Diagram Legend     Erythematous scaling macule/papule c/w actinic keratosis       Vascular papule c/w angioma      Pigmented verrucoid papule/plaque c/w seborrheic keratosis      Yellow umbilicated papule c/w sebaceous hyperplasia      Irregularly shaped tan macule c/w lentigo     1-2 mm smooth white papules consistent with Milia      Movable subcutaneous cyst with punctum c/w epidermal inclusion cyst      Subcutaneous movable cyst c/w pilar cyst      Firm pink to brown papule c/w dermatofibroma      Pedunculated fleshy papule(s) c/w skin tag(s)      Evenly pigmented macule c/w junctional nevus     Mildly variegated pigmented, slightly irregular-bordered macule c/w mildly atypical nevus      Flesh  colored to evenly pigmented papule c/w intradermal nevus       Pink pearly papule/plaque c/w basal cell carcinoma      Erythematous hyperkeratotic cursted plaque c/w SCC      Surgical scar with no sign of skin cancer recurrence      Open and closed comedones      Inflammatory papules and pustules      Verrucoid papule consistent consistent with wart     Erythematous eczematous patches and plaques     Dystrophic onycholytic nail with subungual debris c/w onychomycosis     Umbilicated papule    Erythematous-base heme-crusted tan verrucoid plaque consistent with inflamed seborrheic keratosis     Erythematous Silvery Scaling Plaque c/w Psoriasis     See annotation      Assessment / Plan:        Seborrheic dermatitis  Continue ketoconazole shampoo, clobetasol solution and foam.     Rosacea  -     erythromycin with ethanoL (EMGEL) 2 % gel; AAA of face bid.  For rosacea/redness.  Dispense: 60 g; Refill: 3  -      Of the forehead, will start above med.              Follow up in about 1 year (around 5/20/2021).

## 2020-05-26 DIAGNOSIS — M25.511 RIGHT SHOULDER PAIN, UNSPECIFIED CHRONICITY: Primary | ICD-10-CM

## 2020-05-29 ENCOUNTER — PATIENT MESSAGE (OUTPATIENT)
Dept: ORTHOPEDICS | Facility: CLINIC | Age: 60
End: 2020-05-29

## 2020-05-29 ENCOUNTER — HOSPITAL ENCOUNTER (OUTPATIENT)
Dept: RADIOLOGY | Facility: HOSPITAL | Age: 60
Discharge: HOME | End: 2020-05-29
Attending: FAMILY MEDICINE
Payer: COMMERCIAL

## 2020-05-29 ENCOUNTER — OFFICE VISIT (OUTPATIENT)
Dept: ORTHOPEDICS | Facility: CLINIC | Age: 60
End: 2020-05-29
Payer: COMMERCIAL

## 2020-05-29 VITALS
HEIGHT: 72 IN | HEART RATE: 87 BPM | SYSTOLIC BLOOD PRESSURE: 115 MMHG | WEIGHT: 152 LBS | DIASTOLIC BLOOD PRESSURE: 77 MMHG | BODY MASS INDEX: 20.59 KG/M2

## 2020-05-29 DIAGNOSIS — M25.511 RIGHT SHOULDER PAIN, UNSPECIFIED CHRONICITY: ICD-10-CM

## 2020-05-29 DIAGNOSIS — M25.511 RIGHT SHOULDER PAIN, UNSPECIFIED CHRONICITY: Primary | ICD-10-CM

## 2020-05-29 PROCEDURE — 99214 OFFICE O/P EST MOD 30 MIN: CPT | Mod: 25,S$GLB,, | Performed by: FAMILY MEDICINE

## 2020-05-29 PROCEDURE — 99214 PR OFFICE/OUTPT VISIT, EST, LEVL IV, 30-39 MIN: ICD-10-PCS | Mod: 25,S$GLB,, | Performed by: FAMILY MEDICINE

## 2020-05-29 PROCEDURE — 20610 DRAIN/INJ JOINT/BURSA W/O US: CPT | Mod: RT,S$GLB,, | Performed by: FAMILY MEDICINE

## 2020-05-29 PROCEDURE — 99999 PR PBB SHADOW E&M-EST. PATIENT-LVL III: CPT | Mod: PBBFAC,,, | Performed by: FAMILY MEDICINE

## 2020-05-29 PROCEDURE — 3008F PR BODY MASS INDEX (BMI) DOCUMENTED: ICD-10-PCS | Mod: CPTII,S$GLB,, | Performed by: FAMILY MEDICINE

## 2020-05-29 PROCEDURE — 73030 XR SHOULDER COMPLETE 2 OR MORE VIEWS RIGHT: ICD-10-PCS | Mod: 26,RT,, | Performed by: RADIOLOGY

## 2020-05-29 PROCEDURE — 73030 X-RAY EXAM OF SHOULDER: CPT | Mod: 26,RT,, | Performed by: RADIOLOGY

## 2020-05-29 PROCEDURE — 99999 PR PBB SHADOW E&M-EST. PATIENT-LVL III: ICD-10-PCS | Mod: PBBFAC,,, | Performed by: FAMILY MEDICINE

## 2020-05-29 PROCEDURE — 73030 X-RAY EXAM OF SHOULDER: CPT | Mod: TC,RT

## 2020-05-29 PROCEDURE — 3008F BODY MASS INDEX DOCD: CPT | Mod: CPTII,S$GLB,, | Performed by: FAMILY MEDICINE

## 2020-05-29 PROCEDURE — 20610 LARGE JOINT ASPIRATION/INJECTION: R GLENOHUMERAL: ICD-10-PCS | Mod: RT,S$GLB,, | Performed by: FAMILY MEDICINE

## 2020-05-29 RX ORDER — BETAMETHASONE SODIUM PHOSPHATE AND BETAMETHASONE ACETATE 3; 3 MG/ML; MG/ML
6 INJECTION, SUSPENSION INTRA-ARTICULAR; INTRALESIONAL; INTRAMUSCULAR; SOFT TISSUE
Status: DISCONTINUED | OUTPATIENT
Start: 2020-05-29 | End: 2020-05-29 | Stop reason: HOSPADM

## 2020-05-29 RX ORDER — DICLOFENAC SODIUM 10 MG/G
2 GEL TOPICAL 4 TIMES DAILY
Qty: 1 TUBE | Refills: 5 | Status: SHIPPED | OUTPATIENT
Start: 2020-05-29 | End: 2023-01-23 | Stop reason: ALTCHOICE

## 2020-05-29 RX ADMIN — BETAMETHASONE SODIUM PHOSPHATE AND BETAMETHASONE ACETATE 6 MG: 3; 3 INJECTION, SUSPENSION INTRA-ARTICULAR; INTRALESIONAL; INTRAMUSCULAR; SOFT TISSUE at 03:05

## 2020-05-29 NOTE — PROGRESS NOTES
Subjective:     Patient ID: Kilo Dailey is a 59 y.o. male.    Chief Complaint: Pain of the Right Shoulder      HPI:  This patient states that several years ago he had bilateral elbow problems that were improved with PRP but that over the last year he is had persistent shoulder pain.  He says left shoulder had been worse at 1 time any went through physical therapy and conservative treatment and that it feels much better.    - states that the right shoulder pain has continued in even though he works out regularly and keeps his range of motion good his pain can be up to an 8/10 at times and also affects his sleep as well as ADLs.    He notices the pain is worse when he does bench press type movements but he is careful with his technique.  No recent fever weight loss chills shortness of breath or chest pain.  No specific injury to his shoulder that he knows of.  Past Medical History:   Diagnosis Date    Allergic rhinitis     BPH (benign prostatic hyperplasia)     Colon polyps     Hyperlipidemia     Tennis elbow     Tinea versicolor      Past Surgical History:   Procedure Laterality Date    CATARACT EXTRACTION W/  INTRAOCULAR LENS IMPLANT Right 12/07/2017    CATARACT EXTRACTION W/  INTRAOCULAR LENS IMPLANT Left 12/06/2018    COLONOSCOPY N/A 4/25/2017    Procedure: COLONOSCOPY;  Surgeon: Edilberto Perez MD;  Location: [facility];  Service: Endoscopy;  Laterality: N/A;     Family History   Problem Relation Age of Onset    Hyperlipidemia Father     Hearing loss Father     Prostate cancer Father     Macular degeneration Father     Dementia Father     Prostate cancer Paternal Grandfather     Hyperlipidemia Mother     Dementia Mother     Seizures Mother     Retinitis pigmentosa Sister     Cataracts Sister     Alzheimer's disease Maternal Aunt     Cataracts Maternal Aunt     Alzheimer's disease Maternal Aunt     Alzheimer's disease Maternal Uncle     Cataracts Maternal Uncle     Liver cancer  Maternal Uncle     Cataracts Maternal Grandmother     Prostate cancer Paternal Uncle      Social History     Socioeconomic History    Marital status:      Spouse name: Not on file    Number of children: Not on file    Years of education: Not on file    Highest education level: Not on file   Occupational History    Not on file   Social Needs    Financial resource strain: Not on file    Food insecurity:     Worry: Not on file     Inability: Not on file    Transportation needs:     Medical: Not on file     Non-medical: Not on file   Tobacco Use    Smoking status: Never Smoker    Smokeless tobacco: Never Used   Substance and Sexual Activity    Alcohol use: Yes     Frequency: 2-4 times a month     Drinks per session: 1 or 2     Binge frequency: Never     Comment: 1-2 times a month    Drug use: No    Sexual activity: Not on file   Lifestyle    Physical activity:     Days per week: Not on file     Minutes per session: Not on file    Stress: Not on file   Relationships    Social connections:     Talks on phone: Not on file     Gets together: Not on file     Attends Yazdanism service: Not on file     Active member of club or organization: Not on file     Attends meetings of clubs or organizations: Not on file     Relationship status: Not on file   Other Topics Concern    Not on file   Social History Narrative    The patient is , has 2 children and retired as a .  He works part-time as a .               Current Outpatient Medications:     atorvastatin (LIPITOR) 10 MG tablet, TAKE 1 TABLET BY MOUTH ONCE DAILY, Disp: 90 tablet, Rfl: 1    clobetasol (TEMOVATE) 0.05 % external solution, Apply to affected areas of scalp 1-2 times daily as needed for itch, Disp: 50 mL, Rfl: 3    erythromycin with ethanoL (EMGEL) 2 % gel, AAA of face bid.  For rosacea/redness., Disp: 60 g, Rfl: 3    fluticasone propionate (CUTIVATE) 0.05 % cream, Apply topically 2 (two) times  daily., Disp: 15 g, Rfl: 3    glucosamine-chondroitin 500-400 mg tablet, Take 1 tablet by mouth once daily., Disp: , Rfl:     ketoconazole (NIZORAL) 2 % shampoo, Apply to scalp & effected dampened skin areas, lather and leave on x 5 min, then rinse off.  Use daily x 3 days., Disp: 120 mL, Rfl: 0    ketoconazole 2 % Foam, AAA BID for rash on face, chest and back.  For seborrheic dermatitis and tinea versicolor., Disp: 50 Can, Rfl: 3    triamcinolone acetonide 0.025% (KENALOG) 0.025 % cream, Apply topically 2 (two) times daily as needed. Safe for use on face.  Use for < 2 weeks then stop., Disp: 30 g, Rfl: 1    ketoconazole (NIZORAL) 2 % shampoo, Wash hair with medicated shampoo at least 2x/week - let sit on scalp at least 5 minutes prior to rinsing, Disp: 120 mL, Rfl: 5  Review of patient's allergies indicates:  No Known Allergies  Review of Systems   Constitutional: Negative for chills, fever and weight loss.   Respiratory: Negative for shortness of breath.    Cardiovascular: Negative for chest pain and palpitations.       Objective:   Body mass index is 20.61 kg/m².  Vitals:    05/29/20 1549   BP: 115/77   Pulse: 87           Ortho/SPM Exam thin but well-nourished well-developed fit appearing adult male ambulatory and in no acute distress    Respiratory effort is normal    Right shoulder-no deformity atrophy or swelling noted    Point tenderness to palpation noted medial to the long biceps tendon and also at the coracoid process in a patient localize this as the area of primary pain and concern    Patient is range of motion is very good throughout and also has good strength 5/5.    Patient however does on push-off from the low back testing the subscapularis has mild weakness compared to the left side.    Neurovascular intact    Psychiatric good affect and cognition    IMAGING: X-ray Shoulder 2 or More Views Right  Narrative: EXAMINATION:  XR SHOULDER COMPLETE 2 OR MORE VIEWS RIGHT    CLINICAL HISTORY:  Pain in  right shoulder    TECHNIQUE:  Two or three views of the right shoulder were performed.    COMPARISON:  None    FINDINGS:  Minimal degenerative change at the glenohumeral joint and mild degenerative change at the AC joint.  No fracture or dislocation.  Faint curvilinear calcific density along the superior medial margin of greater tuberosity.  Cannot exclude calcific tendinosis.  Appearance of the greater tuberosity raises possibility of Hill-Sachs lesion.  Correlate clinically.  Remaining osseous structures intact.  No acute fracture or dislocation.  Included right lung field clear.  Impression: As above.    Electronically signed by: Eleno Felder MD  Date:    05/29/2020  Time:    15:44       Radiographs / Imaging : Relevant imaging results reviewed by me and interpreted by me, discussed with the patient and / or family -discussed with patient evidence of calcific tendinopathy but no acute fracture dislocation.      Assessment:     Encounter Diagnosis   Name Primary?    Right shoulder pain, unspecified chronicity Yes        Plan:   Right shoulder pain, unspecified chronicity      25 min spent face-to-face with patient over 50% time consultation regarding proper workouts to take care of his shoulder and avoid worsening the pain and dysfunction.  We also discussed proper use medication including oral, topical and injection.  The patient verbalized good understanding of the medical issues discussed today and expressed appreciation for the care provided.  Patient was given the opportunity to ask questions and be an active participant in their medical care. Patient had no further questions or concerns at this time.     The patient was encouraged to participate in appropriate physical activity.      Giovanny Webb M.D.  [facility]        This note was dictated using voice recognition software and may have sound a like errors.

## 2020-05-29 NOTE — PROCEDURES
Large Joint Aspiration/Injection: R glenohumeral  Date/Time: 5/29/2020 3:30 PM  Performed by: Giovanny Webb MD  Authorized by: Giovanny Webb MD     Indications:  Pain  Site marked: the procedure site was marked    Timeout: prior to procedure the correct patient, procedure, and site was verified    Prep: patient was prepped and draped in usual sterile fashion    Approach:  Anterior  Location:  Shoulder  Site:  R glenohumeral  Medications:  6 mg betamethasone acetate-betamethasone sodium phosphate 6 mg/mL  Patient tolerance:  Patient tolerated the procedure well with no immediate complications     Injected without difficulty to the anterior shoulder along side the biceps tendon and coracoid process.

## 2020-05-29 NOTE — PATIENT INSTRUCTIONS
Ice to shoulder 3 times a day for next few days and avoid heavy lifting with the shoulder for the next 3-5 days.

## 2020-08-04 ENCOUNTER — OFFICE VISIT (OUTPATIENT)
Dept: OPHTHALMOLOGY | Facility: CLINIC | Age: 60
End: 2020-08-04
Payer: COMMERCIAL

## 2020-08-04 DIAGNOSIS — Z83.518 FAMILY HISTORY OF RETINITIS PIGMENTOSA: ICD-10-CM

## 2020-08-04 DIAGNOSIS — H40.013 OPEN ANGLE WITH BORDERLINE FINDINGS OF BOTH EYES: Primary | ICD-10-CM

## 2020-08-04 DIAGNOSIS — Z96.1 PSEUDOPHAKIA OF BOTH EYES: ICD-10-CM

## 2020-08-04 PROCEDURE — 99999 PR PBB SHADOW E&M-EST. PATIENT-LVL II: ICD-10-PCS | Mod: PBBFAC,,, | Performed by: OPHTHALMOLOGY

## 2020-08-04 PROCEDURE — 99999 PR PBB SHADOW E&M-EST. PATIENT-LVL II: CPT | Mod: PBBFAC,,, | Performed by: OPHTHALMOLOGY

## 2020-08-04 PROCEDURE — 92133 POSTERIOR SEGMENT OCT OPTIC NERVE(OCULAR COHERENCE TOMOGRAPHY) - OU - BOTH EYES: ICD-10-PCS | Mod: S$GLB,,, | Performed by: OPHTHALMOLOGY

## 2020-08-04 PROCEDURE — 92133 CPTRZD OPH DX IMG PST SGM ON: CPT | Mod: S$GLB,,, | Performed by: OPHTHALMOLOGY

## 2020-08-04 PROCEDURE — 92014 COMPRE OPH EXAM EST PT 1/>: CPT | Mod: S$GLB,,, | Performed by: OPHTHALMOLOGY

## 2020-08-04 PROCEDURE — 92014 PR EYE EXAM, EST PATIENT,COMPREHESV: ICD-10-PCS | Mod: S$GLB,,, | Performed by: OPHTHALMOLOGY

## 2020-08-04 NOTE — PROGRESS NOTES
HPI     Glaucoma Suspect     Comments: 1 year RTC              Comments     Pt states no problems since his last visit. Vision is still the same, not   using any drops. No ocular pain or irritation.       1. PCIOL OS 12/6/2018   PCIOL OD +18.5 SN60WF / CDE:16.69 / 12/7/17 BLOCK    Coag susp- based on c/d asym.     H/O WET AMD OD -DAD  H/O DRY AMD OS -DAD  H/O RP-SIS          Last edited by Damián Nieves on 8/4/2020  8:22 AM. (History)            Assessment /Plan     For exam results, see Encounter Report.      ICD-10-CM ICD-9-CM    1. Open angle with borderline findings of both eyes  H40.013 365.01 Posterior Segment OCT Optic Nerve- Both eyes Done  today   Doing well - intraocular pressure is within acceptable range relative to target pressure with no evidence of progression.   Continue current treatment.  Reviewed importance of continued compliance with treatment and follow up.      2. Pseudophakia of both eyes  Z96.1 V43.1 Well    3. Family history of retinitis pigmentosa  Z83.518 V19.19        RETURN TO CLINIC 12  Months DOA,  HVF, AND GOCT

## 2020-08-24 DIAGNOSIS — L21.9 SEBORRHEIC DERMATITIS: ICD-10-CM

## 2020-08-24 RX ORDER — CLOBETASOL PROPIONATE 0.46 MG/ML
SOLUTION TOPICAL
Qty: 50 ML | Refills: 3 | Status: SHIPPED | OUTPATIENT
Start: 2020-08-24 | End: 2021-10-18 | Stop reason: SDUPTHER

## 2020-08-27 ENCOUNTER — OFFICE VISIT (OUTPATIENT)
Dept: PRIMARY CARE CLINIC | Facility: CLINIC | Age: 60
End: 2020-08-27
Payer: COMMERCIAL

## 2020-08-27 ENCOUNTER — LAB VISIT (OUTPATIENT)
Dept: LAB | Facility: HOSPITAL | Age: 60
End: 2020-08-27
Attending: FAMILY MEDICINE
Payer: COMMERCIAL

## 2020-08-27 VITALS
TEMPERATURE: 98 F | OXYGEN SATURATION: 97 % | BODY MASS INDEX: 20.62 KG/M2 | DIASTOLIC BLOOD PRESSURE: 80 MMHG | WEIGHT: 152 LBS | HEART RATE: 81 BPM | SYSTOLIC BLOOD PRESSURE: 123 MMHG

## 2020-08-27 DIAGNOSIS — Z00.00 ROUTINE GENERAL MEDICAL EXAMINATION AT A HEALTH CARE FACILITY: Primary | ICD-10-CM

## 2020-08-27 DIAGNOSIS — L21.8 SEBORRHEA CORPORIS: ICD-10-CM

## 2020-08-27 DIAGNOSIS — Z12.5 PROSTATE CANCER SCREENING: ICD-10-CM

## 2020-08-27 DIAGNOSIS — M25.60 DECREASED RANGE OF MOTION: ICD-10-CM

## 2020-08-27 DIAGNOSIS — Z00.00 ROUTINE GENERAL MEDICAL EXAMINATION AT A HEALTH CARE FACILITY: ICD-10-CM

## 2020-08-27 DIAGNOSIS — E78.00 HYPERCHOLESTEREMIA: ICD-10-CM

## 2020-08-27 DIAGNOSIS — N40.0 BENIGN PROSTATIC HYPERPLASIA, UNSPECIFIED WHETHER LOWER URINARY TRACT SYMPTOMS PRESENT: ICD-10-CM

## 2020-08-27 LAB
ALBUMIN SERPL BCP-MCNC: 4.2 G/DL (ref 3.5–5.2)
ALP SERPL-CCNC: 64 U/L (ref 55–135)
ALT SERPL W/O P-5'-P-CCNC: 18 U/L (ref 10–44)
ANION GAP SERPL CALC-SCNC: 6 MMOL/L (ref 8–16)
AST SERPL-CCNC: 18 U/L (ref 10–40)
BASOPHILS # BLD AUTO: 0.04 K/UL (ref 0–0.2)
BASOPHILS NFR BLD: 0.6 % (ref 0–1.9)
BILIRUB SERPL-MCNC: 0.7 MG/DL (ref 0.1–1)
BUN SERPL-MCNC: 16 MG/DL (ref 6–20)
CALCIUM SERPL-MCNC: 9.8 MG/DL (ref 8.7–10.5)
CHLORIDE SERPL-SCNC: 105 MMOL/L (ref 95–110)
CHOLEST SERPL-MCNC: 179 MG/DL (ref 120–199)
CHOLEST/HDLC SERPL: 2.7 {RATIO} (ref 2–5)
CO2 SERPL-SCNC: 28 MMOL/L (ref 23–29)
COMPLEXED PSA SERPL-MCNC: 1.4 NG/ML (ref 0–4)
CREAT SERPL-MCNC: 0.9 MG/DL (ref 0.5–1.4)
DIFFERENTIAL METHOD: NORMAL
EOSINOPHIL # BLD AUTO: 0.1 K/UL (ref 0–0.5)
EOSINOPHIL NFR BLD: 1.4 % (ref 0–8)
ERYTHROCYTE [DISTWIDTH] IN BLOOD BY AUTOMATED COUNT: 12.4 % (ref 11.5–14.5)
EST. GFR  (AFRICAN AMERICAN): >60 ML/MIN/1.73 M^2
EST. GFR  (NON AFRICAN AMERICAN): >60 ML/MIN/1.73 M^2
GLUCOSE SERPL-MCNC: 92 MG/DL (ref 70–110)
HCT VFR BLD AUTO: 45.3 % (ref 40–54)
HDLC SERPL-MCNC: 66 MG/DL (ref 40–75)
HDLC SERPL: 36.9 % (ref 20–50)
HGB BLD-MCNC: 14.7 G/DL (ref 14–18)
IMM GRANULOCYTES # BLD AUTO: 0.02 K/UL (ref 0–0.04)
IMM GRANULOCYTES NFR BLD AUTO: 0.3 % (ref 0–0.5)
LDLC SERPL CALC-MCNC: 100 MG/DL (ref 63–159)
LYMPHOCYTES # BLD AUTO: 1.3 K/UL (ref 1–4.8)
LYMPHOCYTES NFR BLD: 20.9 % (ref 18–48)
MCH RBC QN AUTO: 29.2 PG (ref 27–31)
MCHC RBC AUTO-ENTMCNC: 32.5 G/DL (ref 32–36)
MCV RBC AUTO: 90 FL (ref 82–98)
MONOCYTES # BLD AUTO: 0.5 K/UL (ref 0.3–1)
MONOCYTES NFR BLD: 8.1 % (ref 4–15)
NEUTROPHILS # BLD AUTO: 4.3 K/UL (ref 1.8–7.7)
NEUTROPHILS NFR BLD: 68.7 % (ref 38–73)
NONHDLC SERPL-MCNC: 113 MG/DL
NRBC BLD-RTO: 0 /100 WBC
PLATELET # BLD AUTO: 305 K/UL (ref 150–350)
PMV BLD AUTO: 10.6 FL (ref 9.2–12.9)
POTASSIUM SERPL-SCNC: 4.3 MMOL/L (ref 3.5–5.1)
PROT SERPL-MCNC: 7 G/DL (ref 6–8.4)
RBC # BLD AUTO: 5.04 M/UL (ref 4.6–6.2)
SODIUM SERPL-SCNC: 139 MMOL/L (ref 136–145)
TRIGL SERPL-MCNC: 65 MG/DL (ref 30–150)
TSH SERPL DL<=0.005 MIU/L-ACNC: 2.12 UIU/ML (ref 0.4–4)
WBC # BLD AUTO: 6.26 K/UL (ref 3.9–12.7)

## 2020-08-27 PROCEDURE — 80053 COMPREHEN METABOLIC PANEL: CPT

## 2020-08-27 PROCEDURE — 99999 PR PBB SHADOW E&M-EST. PATIENT-LVL IV: ICD-10-PCS | Mod: PBBFAC,,, | Performed by: FAMILY MEDICINE

## 2020-08-27 PROCEDURE — 36415 COLL VENOUS BLD VENIPUNCTURE: CPT | Mod: PN

## 2020-08-27 PROCEDURE — 85025 COMPLETE CBC W/AUTO DIFF WBC: CPT

## 2020-08-27 PROCEDURE — 99999 PR PBB SHADOW E&M-EST. PATIENT-LVL IV: CPT | Mod: PBBFAC,,, | Performed by: FAMILY MEDICINE

## 2020-08-27 PROCEDURE — 3008F PR BODY MASS INDEX (BMI) DOCUMENTED: ICD-10-PCS | Mod: CPTII,S$GLB,, | Performed by: FAMILY MEDICINE

## 2020-08-27 PROCEDURE — 99396 PREV VISIT EST AGE 40-64: CPT | Mod: S$GLB,,, | Performed by: FAMILY MEDICINE

## 2020-08-27 PROCEDURE — 99396 PR PREVENTIVE VISIT,EST,40-64: ICD-10-PCS | Mod: S$GLB,,, | Performed by: FAMILY MEDICINE

## 2020-08-27 PROCEDURE — 84153 ASSAY OF PSA TOTAL: CPT

## 2020-08-27 PROCEDURE — 84443 ASSAY THYROID STIM HORMONE: CPT

## 2020-08-27 PROCEDURE — 3008F BODY MASS INDEX DOCD: CPT | Mod: CPTII,S$GLB,, | Performed by: FAMILY MEDICINE

## 2020-08-27 PROCEDURE — 80061 LIPID PANEL: CPT

## 2020-08-27 RX ORDER — DEXBROMPHENIRAMINE MALEATE AND PHENYLEPHRINE HYDROCHLORIDE 2; 10 MG/1; MG/1
1 TABLET ORAL EVERY 6 HOURS PRN
COMMUNITY
Start: 2020-08-17 | End: 2021-07-16

## 2020-08-27 NOTE — PROGRESS NOTES
Subjective:      Patient ID: Kilo Dailey is a 59 y.o. male.    Chief Complaint: Annual Exam, Establish Care, and Shoulder Pain (RIGHT SIDE )    Disclaimer:  This note is prepared using voice recognition software and as such is likely to have errors and has not been proof read. Please contact me for questions.     Kilo Dailey is a 59 y.o. male who presents today to establish care.  Prior patient of Dr. Dash.  Needing to change locations as he lives near [facility] location.  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  Is very active.  Does work out at the [facility] with weights as well as yoga.  States healthy.  Does not usually get flu shots.  Does want to do a PSA screening test.  Is on Lipitor 10 tolerating well.  No problems with medication.  Does occasionally have symptoms of BPH.  Some hesitancy with 1st urination the morning but otherwise problems not interested in doing medications at this time.  Discussed reasons and recommendations on prostate cancer screening of lab work versus digital rectal exam versus combination.    Recently received an injection in his right shoulder for frozen shoulder and chronic tendinosis through Sports Medicine Dr. Webb.  Is doing well still having a little bit of clicking but working on range of motion.    Also battles seborrheic dermatitis as well.  Has some topical creams as well noticed a new patch on his right knee as well.    Patient Active Problem List:     Hypercholesteremia     BPH (benign prostatic hyperplasia)     Family history of retinitis pigmentosa     Pseudophakia of both eyes     Open angle with borderline findings of both eyes     Right shoulder pain     Decreased range of motion    Reviewed past medical surgical social and family history entered in by the patient.      Lab Results   Component Value Date    WBC 4.76 05/10/2019    HGB 15.3 05/10/2019    HCT 47.4 05/10/2019     05/10/2019    CHOL 176 05/10/2019    TRIG 66 05/10/2019    HDL 70  05/10/2019    ALT 21 05/10/2019    AST 19 05/10/2019     05/10/2019    K 5.0 05/10/2019     05/10/2019    CREATININE 1.0 05/10/2019    BUN 15 05/10/2019    CO2 31 (H) 05/10/2019    TSH 1.807 05/10/2019    PSA 1.4 05/10/2019       Posterior Segment OCT Optic Nerve- Both eyes  Right Eye  Quality was good. Scan locations included Retinal Nerve Fiber Layer   (RNFL).     Left Eye  Quality was good. Scan locations included Retinal Nerve Fiber Layer   (RNFL).     Findings  Right Eye  Normal. Progression has been stable.     Left Eye  Normal. Progression has been stable.     Notes  GCL 30/31         Review of Systems   Constitutional: Negative for activity change and unexpected weight change.   HENT: Negative for hearing loss, rhinorrhea and trouble swallowing.    Eyes: Negative for discharge and visual disturbance.   Respiratory: Negative for chest tightness and wheezing.    Cardiovascular: Negative for chest pain and palpitations.   Gastrointestinal: Negative for blood in stool, constipation, diarrhea and vomiting.   Endocrine: Negative for polydipsia and polyuria.   Genitourinary: Negative for difficulty urinating, hematuria and urgency.   Musculoskeletal: Negative for arthralgias, joint swelling and neck pain.   Neurological: Negative for weakness and headaches.   Psychiatric/Behavioral: Negative for confusion and dysphoric mood.     Objective:     Vitals:    08/27/20 0742   BP: 123/80   Pulse: 81   Temp: 98.4 °F (36.9 °C)   SpO2: 97%   Weight: 68.9 kg (152 lb 0.1 oz)     Physical Exam  Vitals signs reviewed.   Constitutional:       General: He is not in acute distress.     Appearance: Normal appearance. He is well-developed and normal weight.   HENT:      Head: Normocephalic and atraumatic.      Right Ear: Tympanic membrane and external ear normal.      Left Ear: Tympanic membrane and external ear normal.      Nose: Nose normal.      Mouth/Throat:      Mouth: Mucous membranes are moist.      Pharynx:  Oropharynx is clear.   Eyes:      Conjunctiva/sclera: Conjunctivae normal.      Pupils: Pupils are equal, round, and reactive to light.   Neck:      Musculoskeletal: Normal range of motion and neck supple.      Thyroid: No thyromegaly.   Cardiovascular:      Rate and Rhythm: Normal rate and regular rhythm.      Heart sounds: No murmur. No friction rub. No gallop.    Pulmonary:      Effort: Pulmonary effort is normal. No respiratory distress.      Breath sounds: Normal breath sounds.   Abdominal:      General: Abdomen is flat. Bowel sounds are normal. There is no distension.      Palpations: Abdomen is soft.      Tenderness: There is no abdominal tenderness. There is no rebound.   Musculoskeletal: Normal range of motion.   Lymphadenopathy:      Cervical: No cervical adenopathy.   Skin:     General: Skin is warm and dry.   Neurological:      General: No focal deficit present.      Mental Status: He is alert and oriented to person, place, and time.      Coordination: Coordination normal.   Psychiatric:         Attention and Perception: Attention normal.         Mood and Affect: Mood and affect normal.         Speech: Speech normal.         Behavior: Behavior normal.         Thought Content: Thought content normal.         Cognition and Memory: Cognition normal.         Judgment: Judgment normal.       Assessment:     1. Routine general medical examination at a health care facility    2. Hypercholesteremia    3. Prostate cancer screening    4. Benign prostatic hyperplasia, unspecified whether lower urinary tract symptoms present    5. Decreased range of motion    6. Seborrhea corporis      Plan:   Kilo was seen today for annual exam, establish care and shoulder pain.    Diagnoses and all orders for this visit:    Routine general medical examination at a health care facility  Comments:  Discussed health maintenance issues declines flu shot did discuss shingles vaccine lab work today follow-up 1 year  Orders:  -      TSH; Future  -     Lipid Panel; Future  -     Comprehensive metabolic panel; Future  -     CBC auto differential; Future  -     PSA, Screening; Future    Hypercholesteremia  Comments:  Continue with Lipitor 10.  Lab work today  Orders:  -     TSH; Future  -     Lipid Panel; Future  -     Comprehensive metabolic panel; Future  -     CBC auto differential; Future  -     PSA, Screening; Future    Prostate cancer screening  Comments:  PSA today.  Discussed additional screening testing  Orders:  -     PSA, Screening; Future    Benign prostatic hyperplasia, unspecified whether lower urinary tract symptoms present  Comments:  Declines medicines at this time will monitor did discuss Flomax    Decreased range of motion  Comments:  Discussed stretching exercises topical lidocaine prior to exercises follow-up with sports medicine if necessary    Seborrhea corporis  Comments:  Can use Neutrogena T/Gel shampoo as a body wash as well as ketoconazole creams and shampoos as necessary    Other orders  -     Cancel: Hemoglobin A1C; Future  -     Cancel: Microalbumin/creatinine urine ratio; Future            Follow up in about 1 year (around 8/27/2021) for physical with Dr JONES.    Patient Instructions   neutrogenia T gel shampoo     Lidocaine gel to shoulder prior to workouts     flomax for bph

## 2020-09-11 RX ORDER — ATORVASTATIN CALCIUM 10 MG/1
TABLET, FILM COATED ORAL
Qty: 90 TABLET | Refills: 0 | OUTPATIENT
Start: 2020-09-11

## 2021-03-15 ENCOUNTER — PATIENT OUTREACH (OUTPATIENT)
Dept: ADMINISTRATIVE | Facility: OTHER | Age: 61
End: 2021-03-15

## 2021-03-15 ENCOUNTER — OFFICE VISIT (OUTPATIENT)
Dept: OPHTHALMOLOGY | Facility: CLINIC | Age: 61
End: 2021-03-15
Payer: COMMERCIAL

## 2021-03-15 DIAGNOSIS — Z96.1 PSEUDOPHAKIA OF BOTH EYES: ICD-10-CM

## 2021-03-15 DIAGNOSIS — H18.831 RECURRENT CORNEAL EROSION, RIGHT: Primary | ICD-10-CM

## 2021-03-15 PROCEDURE — 99203 PR OFFICE/OUTPT VISIT, NEW, LEVL III, 30-44 MIN: ICD-10-PCS | Mod: S$GLB,,, | Performed by: OPTOMETRIST

## 2021-03-15 PROCEDURE — 99999 PR PBB SHADOW E&M-EST. PATIENT-LVL III: CPT | Mod: PBBFAC,,, | Performed by: OPTOMETRIST

## 2021-03-15 PROCEDURE — 99203 OFFICE O/P NEW LOW 30 MIN: CPT | Mod: S$GLB,,, | Performed by: OPTOMETRIST

## 2021-03-15 PROCEDURE — 99999 PR PBB SHADOW E&M-EST. PATIENT-LVL III: ICD-10-PCS | Mod: PBBFAC,,, | Performed by: OPTOMETRIST

## 2021-07-15 ENCOUNTER — PATIENT MESSAGE (OUTPATIENT)
Dept: FAMILY MEDICINE | Facility: CLINIC | Age: 61
End: 2021-07-15

## 2021-07-16 ENCOUNTER — OFFICE VISIT (OUTPATIENT)
Dept: FAMILY MEDICINE | Facility: CLINIC | Age: 61
End: 2021-07-16
Payer: COMMERCIAL

## 2021-07-16 VITALS
HEIGHT: 72 IN | TEMPERATURE: 98 F | WEIGHT: 145.06 LBS | OXYGEN SATURATION: 99 % | DIASTOLIC BLOOD PRESSURE: 80 MMHG | HEART RATE: 101 BPM | SYSTOLIC BLOOD PRESSURE: 110 MMHG | BODY MASS INDEX: 19.65 KG/M2

## 2021-07-16 DIAGNOSIS — Z12.5 PROSTATE CANCER SCREENING: ICD-10-CM

## 2021-07-16 DIAGNOSIS — Z00.00 PREVENTATIVE HEALTH CARE: Primary | ICD-10-CM

## 2021-07-16 DIAGNOSIS — E78.00 HYPERCHOLESTEREMIA: ICD-10-CM

## 2021-07-16 PROCEDURE — 99396 PREV VISIT EST AGE 40-64: CPT | Mod: S$GLB,,, | Performed by: FAMILY MEDICINE

## 2021-07-16 PROCEDURE — 1126F PR PAIN SEVERITY QUANTIFIED, NO PAIN PRESENT: ICD-10-PCS | Mod: S$GLB,,, | Performed by: FAMILY MEDICINE

## 2021-07-16 PROCEDURE — 3008F PR BODY MASS INDEX (BMI) DOCUMENTED: ICD-10-PCS | Mod: CPTII,S$GLB,, | Performed by: FAMILY MEDICINE

## 2021-07-16 PROCEDURE — 99396 PR PREVENTIVE VISIT,EST,40-64: ICD-10-PCS | Mod: S$GLB,,, | Performed by: FAMILY MEDICINE

## 2021-07-16 PROCEDURE — 1126F AMNT PAIN NOTED NONE PRSNT: CPT | Mod: S$GLB,,, | Performed by: FAMILY MEDICINE

## 2021-07-16 PROCEDURE — 3008F BODY MASS INDEX DOCD: CPT | Mod: CPTII,S$GLB,, | Performed by: FAMILY MEDICINE

## 2021-07-16 PROCEDURE — 99999 PR PBB SHADOW E&M-EST. PATIENT-LVL III: ICD-10-PCS | Mod: PBBFAC,,, | Performed by: FAMILY MEDICINE

## 2021-07-16 PROCEDURE — 99999 PR PBB SHADOW E&M-EST. PATIENT-LVL III: CPT | Mod: PBBFAC,,, | Performed by: FAMILY MEDICINE

## 2021-07-27 ENCOUNTER — LAB VISIT (OUTPATIENT)
Dept: LAB | Facility: HOSPITAL | Age: 61
End: 2021-07-27
Payer: COMMERCIAL

## 2021-07-27 DIAGNOSIS — Z00.00 PREVENTATIVE HEALTH CARE: ICD-10-CM

## 2021-07-27 DIAGNOSIS — Z12.5 PROSTATE CANCER SCREENING: ICD-10-CM

## 2021-07-27 LAB
ALBUMIN SERPL BCP-MCNC: 4.2 G/DL (ref 3.5–5.2)
ALP SERPL-CCNC: 62 U/L (ref 55–135)
ALT SERPL W/O P-5'-P-CCNC: 17 U/L (ref 10–44)
ANION GAP SERPL CALC-SCNC: 10 MMOL/L (ref 8–16)
AST SERPL-CCNC: 19 U/L (ref 10–40)
BASOPHILS # BLD AUTO: 0.04 K/UL (ref 0–0.2)
BASOPHILS NFR BLD: 0.5 % (ref 0–1.9)
BILIRUB SERPL-MCNC: 1 MG/DL (ref 0.1–1)
BUN SERPL-MCNC: 18 MG/DL (ref 8–23)
CALCIUM SERPL-MCNC: 10.3 MG/DL (ref 8.7–10.5)
CHLORIDE SERPL-SCNC: 107 MMOL/L (ref 95–110)
CHOLEST SERPL-MCNC: 191 MG/DL (ref 120–199)
CHOLEST/HDLC SERPL: 2.8 {RATIO} (ref 2–5)
CO2 SERPL-SCNC: 26 MMOL/L (ref 23–29)
CREAT SERPL-MCNC: 1 MG/DL (ref 0.5–1.4)
DIFFERENTIAL METHOD: NORMAL
EOSINOPHIL # BLD AUTO: 0.1 K/UL (ref 0–0.5)
EOSINOPHIL NFR BLD: 1.6 % (ref 0–8)
ERYTHROCYTE [DISTWIDTH] IN BLOOD BY AUTOMATED COUNT: 13.3 % (ref 11.5–14.5)
EST. GFR  (AFRICAN AMERICAN): >60 ML/MIN/1.73 M^2
EST. GFR  (NON AFRICAN AMERICAN): >60 ML/MIN/1.73 M^2
GLUCOSE SERPL-MCNC: 100 MG/DL (ref 70–110)
HCT VFR BLD AUTO: 46.2 % (ref 40–54)
HDLC SERPL-MCNC: 69 MG/DL (ref 40–75)
HDLC SERPL: 36.1 % (ref 20–50)
HGB BLD-MCNC: 15.1 G/DL (ref 14–18)
IMM GRANULOCYTES # BLD AUTO: 0.02 K/UL (ref 0–0.04)
IMM GRANULOCYTES NFR BLD AUTO: 0.3 % (ref 0–0.5)
LDLC SERPL CALC-MCNC: 105.4 MG/DL (ref 63–159)
LYMPHOCYTES # BLD AUTO: 1.4 K/UL (ref 1–4.8)
LYMPHOCYTES NFR BLD: 18.3 % (ref 18–48)
MCH RBC QN AUTO: 29.3 PG (ref 27–31)
MCHC RBC AUTO-ENTMCNC: 32.7 G/DL (ref 32–36)
MCV RBC AUTO: 90 FL (ref 82–98)
MONOCYTES # BLD AUTO: 0.7 K/UL (ref 0.3–1)
MONOCYTES NFR BLD: 9 % (ref 4–15)
NEUTROPHILS # BLD AUTO: 5.2 K/UL (ref 1.8–7.7)
NEUTROPHILS NFR BLD: 70.3 % (ref 38–73)
NONHDLC SERPL-MCNC: 122 MG/DL
NRBC BLD-RTO: 0 /100 WBC
PLATELET # BLD AUTO: 251 K/UL (ref 150–450)
PMV BLD AUTO: 10.6 FL (ref 9.2–12.9)
POTASSIUM SERPL-SCNC: 5 MMOL/L (ref 3.5–5.1)
PROT SERPL-MCNC: 7 G/DL (ref 6–8.4)
RBC # BLD AUTO: 5.15 M/UL (ref 4.6–6.2)
SODIUM SERPL-SCNC: 143 MMOL/L (ref 136–145)
TRIGL SERPL-MCNC: 83 MG/DL (ref 30–150)
WBC # BLD AUTO: 7.36 K/UL (ref 3.9–12.7)

## 2021-07-27 PROCEDURE — 84443 ASSAY THYROID STIM HORMONE: CPT | Performed by: FAMILY MEDICINE

## 2021-07-27 PROCEDURE — 36415 COLL VENOUS BLD VENIPUNCTURE: CPT | Mod: PN | Performed by: FAMILY MEDICINE

## 2021-07-27 PROCEDURE — 85025 COMPLETE CBC W/AUTO DIFF WBC: CPT | Performed by: FAMILY MEDICINE

## 2021-07-27 PROCEDURE — 80053 COMPREHEN METABOLIC PANEL: CPT | Performed by: FAMILY MEDICINE

## 2021-07-27 PROCEDURE — 87389 HIV-1 AG W/HIV-1&-2 AB AG IA: CPT | Performed by: FAMILY MEDICINE

## 2021-07-27 PROCEDURE — 84153 ASSAY OF PSA TOTAL: CPT | Performed by: FAMILY MEDICINE

## 2021-07-27 PROCEDURE — 80061 LIPID PANEL: CPT | Performed by: FAMILY MEDICINE

## 2021-07-28 LAB
COMPLEXED PSA SERPL-MCNC: 1.8 NG/ML (ref 0–4)
HIV 1+2 AB+HIV1 P24 AG SERPL QL IA: NEGATIVE
TSH SERPL DL<=0.005 MIU/L-ACNC: 2 UIU/ML (ref 0.4–4)

## 2021-08-04 ENCOUNTER — OFFICE VISIT (OUTPATIENT)
Dept: OPHTHALMOLOGY | Facility: CLINIC | Age: 61
End: 2021-08-04
Payer: COMMERCIAL

## 2021-08-04 ENCOUNTER — PATIENT OUTREACH (OUTPATIENT)
Dept: ADMINISTRATIVE | Facility: OTHER | Age: 61
End: 2021-08-04

## 2021-08-04 DIAGNOSIS — H40.013 OPEN ANGLE WITH BORDERLINE FINDINGS OF BOTH EYES: Primary | ICD-10-CM

## 2021-08-04 DIAGNOSIS — H04.123 DRY EYE SYNDROME, BILATERAL: ICD-10-CM

## 2021-08-04 DIAGNOSIS — Z83.518 FAMILY HISTORY OF RETINITIS PIGMENTOSA: ICD-10-CM

## 2021-08-04 DIAGNOSIS — Z96.1 PSEUDOPHAKIA OF BOTH EYES: ICD-10-CM

## 2021-08-04 DIAGNOSIS — H18.831 RECURRENT CORNEAL EROSION, RIGHT: ICD-10-CM

## 2021-08-04 PROCEDURE — 92133 CPTRZD OPH DX IMG PST SGM ON: CPT | Mod: S$GLB,,, | Performed by: OPHTHALMOLOGY

## 2021-08-04 PROCEDURE — 99214 PR OFFICE/OUTPT VISIT, EST, LEVL IV, 30-39 MIN: ICD-10-PCS | Mod: S$GLB,,, | Performed by: OPHTHALMOLOGY

## 2021-08-04 PROCEDURE — 92133 POSTERIOR SEGMENT OCT OPTIC NERVE(OCULAR COHERENCE TOMOGRAPHY) - OU - BOTH EYES: ICD-10-PCS | Mod: S$GLB,,, | Performed by: OPHTHALMOLOGY

## 2021-08-04 PROCEDURE — 99999 PR PBB SHADOW E&M-EST. PATIENT-LVL II: CPT | Mod: PBBFAC,,, | Performed by: OPHTHALMOLOGY

## 2021-08-04 PROCEDURE — 1160F PR REVIEW ALL MEDS BY PRESCRIBER/CLIN PHARMACIST DOCUMENTED: ICD-10-PCS | Mod: CPTII,S$GLB,, | Performed by: OPHTHALMOLOGY

## 2021-08-04 PROCEDURE — 1159F PR MEDICATION LIST DOCUMENTED IN MEDICAL RECORD: ICD-10-PCS | Mod: CPTII,S$GLB,, | Performed by: OPHTHALMOLOGY

## 2021-08-04 PROCEDURE — 92083 HUMPHREY VISUAL FIELD - OU - BOTH EYES: ICD-10-PCS | Mod: S$GLB,,, | Performed by: OPHTHALMOLOGY

## 2021-08-04 PROCEDURE — 99999 PR PBB SHADOW E&M-EST. PATIENT-LVL II: ICD-10-PCS | Mod: PBBFAC,,, | Performed by: OPHTHALMOLOGY

## 2021-08-04 PROCEDURE — 1160F RVW MEDS BY RX/DR IN RCRD: CPT | Mod: CPTII,S$GLB,, | Performed by: OPHTHALMOLOGY

## 2021-08-04 PROCEDURE — 1159F MED LIST DOCD IN RCRD: CPT | Mod: CPTII,S$GLB,, | Performed by: OPHTHALMOLOGY

## 2021-08-04 PROCEDURE — 92083 EXTENDED VISUAL FIELD XM: CPT | Mod: S$GLB,,, | Performed by: OPHTHALMOLOGY

## 2021-08-04 PROCEDURE — 99214 OFFICE O/P EST MOD 30 MIN: CPT | Mod: S$GLB,,, | Performed by: OPHTHALMOLOGY

## 2021-08-18 ENCOUNTER — TELEPHONE (OUTPATIENT)
Dept: FAMILY MEDICINE | Facility: CLINIC | Age: 61
End: 2021-08-18

## 2021-08-25 ENCOUNTER — PATIENT MESSAGE (OUTPATIENT)
Dept: FAMILY MEDICINE | Facility: CLINIC | Age: 61
End: 2021-08-25

## 2021-08-27 ENCOUNTER — OFFICE VISIT (OUTPATIENT)
Dept: FAMILY MEDICINE | Facility: CLINIC | Age: 61
End: 2021-08-27
Payer: COMMERCIAL

## 2021-08-27 VITALS
BODY MASS INDEX: 19.71 KG/M2 | SYSTOLIC BLOOD PRESSURE: 108 MMHG | RESPIRATION RATE: 18 BRPM | HEIGHT: 72 IN | WEIGHT: 145.5 LBS | HEART RATE: 90 BPM | OXYGEN SATURATION: 97 % | DIASTOLIC BLOOD PRESSURE: 78 MMHG | TEMPERATURE: 98 F

## 2021-08-27 DIAGNOSIS — L98.9 SKIN LESION OF BACK: Primary | ICD-10-CM

## 2021-08-27 PROCEDURE — 3078F DIAST BP <80 MM HG: CPT | Mod: CPTII,S$GLB,, | Performed by: FAMILY MEDICINE

## 2021-08-27 PROCEDURE — 3074F SYST BP LT 130 MM HG: CPT | Mod: CPTII,S$GLB,, | Performed by: FAMILY MEDICINE

## 2021-08-27 PROCEDURE — 3078F PR MOST RECENT DIASTOLIC BLOOD PRESSURE < 80 MM HG: ICD-10-PCS | Mod: CPTII,S$GLB,, | Performed by: FAMILY MEDICINE

## 2021-08-27 PROCEDURE — 1159F PR MEDICATION LIST DOCUMENTED IN MEDICAL RECORD: ICD-10-PCS | Mod: CPTII,S$GLB,, | Performed by: FAMILY MEDICINE

## 2021-08-27 PROCEDURE — 1126F AMNT PAIN NOTED NONE PRSNT: CPT | Mod: CPTII,S$GLB,, | Performed by: FAMILY MEDICINE

## 2021-08-27 PROCEDURE — 99213 OFFICE O/P EST LOW 20 MIN: CPT | Mod: S$GLB,,, | Performed by: FAMILY MEDICINE

## 2021-08-27 PROCEDURE — 99999 PR PBB SHADOW E&M-EST. PATIENT-LVL III: ICD-10-PCS | Mod: PBBFAC,,, | Performed by: FAMILY MEDICINE

## 2021-08-27 PROCEDURE — 3008F BODY MASS INDEX DOCD: CPT | Mod: CPTII,S$GLB,, | Performed by: FAMILY MEDICINE

## 2021-08-27 PROCEDURE — 1160F PR REVIEW ALL MEDS BY PRESCRIBER/CLIN PHARMACIST DOCUMENTED: ICD-10-PCS | Mod: CPTII,S$GLB,, | Performed by: FAMILY MEDICINE

## 2021-08-27 PROCEDURE — 3074F PR MOST RECENT SYSTOLIC BLOOD PRESSURE < 130 MM HG: ICD-10-PCS | Mod: CPTII,S$GLB,, | Performed by: FAMILY MEDICINE

## 2021-08-27 PROCEDURE — 3008F PR BODY MASS INDEX (BMI) DOCUMENTED: ICD-10-PCS | Mod: CPTII,S$GLB,, | Performed by: FAMILY MEDICINE

## 2021-08-27 PROCEDURE — 1126F PR PAIN SEVERITY QUANTIFIED, NO PAIN PRESENT: ICD-10-PCS | Mod: CPTII,S$GLB,, | Performed by: FAMILY MEDICINE

## 2021-08-27 PROCEDURE — 99999 PR PBB SHADOW E&M-EST. PATIENT-LVL III: CPT | Mod: PBBFAC,,, | Performed by: FAMILY MEDICINE

## 2021-08-27 PROCEDURE — 1160F RVW MEDS BY RX/DR IN RCRD: CPT | Mod: CPTII,S$GLB,, | Performed by: FAMILY MEDICINE

## 2021-08-27 PROCEDURE — 1159F MED LIST DOCD IN RCRD: CPT | Mod: CPTII,S$GLB,, | Performed by: FAMILY MEDICINE

## 2021-08-27 PROCEDURE — 99213 PR OFFICE/OUTPT VISIT, EST, LEVL III, 20-29 MIN: ICD-10-PCS | Mod: S$GLB,,, | Performed by: FAMILY MEDICINE

## 2021-10-17 ENCOUNTER — PATIENT MESSAGE (OUTPATIENT)
Dept: DERMATOLOGY | Facility: CLINIC | Age: 61
End: 2021-10-17

## 2021-10-17 ENCOUNTER — PATIENT MESSAGE (OUTPATIENT)
Dept: OPHTHALMOLOGY | Facility: CLINIC | Age: 61
End: 2021-10-17
Payer: COMMERCIAL

## 2021-10-18 ENCOUNTER — PATIENT MESSAGE (OUTPATIENT)
Dept: FAMILY MEDICINE | Facility: CLINIC | Age: 61
End: 2021-10-18

## 2021-10-18 DIAGNOSIS — L21.9 SEBORRHEIC DERMATITIS: ICD-10-CM

## 2021-10-18 RX ORDER — ATORVASTATIN CALCIUM 10 MG/1
10 TABLET, FILM COATED ORAL DAILY
Qty: 90 TABLET | Refills: 2 | Status: SHIPPED | OUTPATIENT
Start: 2021-10-18 | End: 2023-06-27

## 2021-10-19 RX ORDER — CLOBETASOL PROPIONATE 0.46 MG/ML
SOLUTION TOPICAL
Qty: 50 ML | Refills: 3 | Status: SHIPPED | OUTPATIENT
Start: 2021-10-19 | End: 2021-10-22 | Stop reason: SDUPTHER

## 2021-10-20 ENCOUNTER — OFFICE VISIT (OUTPATIENT)
Dept: OPHTHALMOLOGY | Facility: CLINIC | Age: 61
End: 2021-10-20
Payer: COMMERCIAL

## 2021-10-20 DIAGNOSIS — Z96.1 PSEUDOPHAKIA OF BOTH EYES: ICD-10-CM

## 2021-10-20 DIAGNOSIS — H18.831 RECURRENT CORNEAL EROSION, RIGHT: ICD-10-CM

## 2021-10-20 DIAGNOSIS — H43.393 VITREOUS FLOATERS OF BOTH EYES: Primary | ICD-10-CM

## 2021-10-20 DIAGNOSIS — H40.013 OPEN ANGLE WITH BORDERLINE FINDINGS OF BOTH EYES: ICD-10-CM

## 2021-10-20 DIAGNOSIS — H04.123 DRY EYE SYNDROME, BILATERAL: ICD-10-CM

## 2021-10-20 PROCEDURE — 1159F MED LIST DOCD IN RCRD: CPT | Mod: CPTII,S$GLB,, | Performed by: OPHTHALMOLOGY

## 2021-10-20 PROCEDURE — 99999 PR PBB SHADOW E&M-EST. PATIENT-LVL II: CPT | Mod: PBBFAC,,, | Performed by: OPHTHALMOLOGY

## 2021-10-20 PROCEDURE — 99214 OFFICE O/P EST MOD 30 MIN: CPT | Mod: S$GLB,,, | Performed by: OPHTHALMOLOGY

## 2021-10-20 PROCEDURE — 1159F PR MEDICATION LIST DOCUMENTED IN MEDICAL RECORD: ICD-10-PCS | Mod: CPTII,S$GLB,, | Performed by: OPHTHALMOLOGY

## 2021-10-20 PROCEDURE — 99214 PR OFFICE/OUTPT VISIT, EST, LEVL IV, 30-39 MIN: ICD-10-PCS | Mod: S$GLB,,, | Performed by: OPHTHALMOLOGY

## 2021-10-20 PROCEDURE — 99999 PR PBB SHADOW E&M-EST. PATIENT-LVL II: ICD-10-PCS | Mod: PBBFAC,,, | Performed by: OPHTHALMOLOGY

## 2021-10-22 ENCOUNTER — TELEPHONE (OUTPATIENT)
Dept: OPHTHALMOLOGY | Facility: CLINIC | Age: 61
End: 2021-10-22

## 2021-10-22 DIAGNOSIS — L21.9 SEBORRHEIC DERMATITIS: ICD-10-CM

## 2021-10-25 ENCOUNTER — OFFICE VISIT (OUTPATIENT)
Dept: OPHTHALMOLOGY | Facility: CLINIC | Age: 61
End: 2021-10-25
Payer: COMMERCIAL

## 2021-10-25 DIAGNOSIS — H40.013 OPEN ANGLE WITH BORDERLINE FINDINGS OF BOTH EYES: ICD-10-CM

## 2021-10-25 DIAGNOSIS — H43.393 VITREOUS FLOATERS OF BOTH EYES: ICD-10-CM

## 2021-10-25 DIAGNOSIS — Z96.1 PSEUDOPHAKIA OF BOTH EYES: ICD-10-CM

## 2021-10-25 DIAGNOSIS — Z83.518 FAMILY HISTORY OF RETINITIS PIGMENTOSA: ICD-10-CM

## 2021-10-25 DIAGNOSIS — H04.123 DRY EYE SYNDROME, BILATERAL: ICD-10-CM

## 2021-10-25 DIAGNOSIS — H43.811 PVD (POSTERIOR VITREOUS DETACHMENT), RIGHT: Primary | ICD-10-CM

## 2021-10-25 PROCEDURE — 1159F PR MEDICATION LIST DOCUMENTED IN MEDICAL RECORD: ICD-10-PCS | Mod: CPTII,S$GLB,, | Performed by: OPHTHALMOLOGY

## 2021-10-25 PROCEDURE — 99213 OFFICE O/P EST LOW 20 MIN: CPT | Mod: S$GLB,,, | Performed by: OPHTHALMOLOGY

## 2021-10-25 PROCEDURE — 1160F RVW MEDS BY RX/DR IN RCRD: CPT | Mod: CPTII,S$GLB,, | Performed by: OPHTHALMOLOGY

## 2021-10-25 PROCEDURE — 92134 POSTERIOR SEGMENT OCT RETINA (OCULAR COHERENCE TOMOGRAPHY)-BOTH EYES: ICD-10-PCS | Mod: S$GLB,,, | Performed by: OPHTHALMOLOGY

## 2021-10-25 PROCEDURE — 99213 PR OFFICE/OUTPT VISIT, EST, LEVL III, 20-29 MIN: ICD-10-PCS | Mod: S$GLB,,, | Performed by: OPHTHALMOLOGY

## 2021-10-25 PROCEDURE — 99999 PR PBB SHADOW E&M-EST. PATIENT-LVL II: ICD-10-PCS | Mod: PBBFAC,,, | Performed by: OPHTHALMOLOGY

## 2021-10-25 PROCEDURE — 99999 PR PBB SHADOW E&M-EST. PATIENT-LVL II: CPT | Mod: PBBFAC,,, | Performed by: OPHTHALMOLOGY

## 2021-10-25 PROCEDURE — 1159F MED LIST DOCD IN RCRD: CPT | Mod: CPTII,S$GLB,, | Performed by: OPHTHALMOLOGY

## 2021-10-25 PROCEDURE — 92134 CPTRZ OPH DX IMG PST SGM RTA: CPT | Mod: S$GLB,,, | Performed by: OPHTHALMOLOGY

## 2021-10-25 PROCEDURE — 1160F PR REVIEW ALL MEDS BY PRESCRIBER/CLIN PHARMACIST DOCUMENTED: ICD-10-PCS | Mod: CPTII,S$GLB,, | Performed by: OPHTHALMOLOGY

## 2021-10-25 RX ORDER — CLOBETASOL PROPIONATE 0.46 MG/ML
SOLUTION TOPICAL
Qty: 50 ML | Refills: 3 | Status: SHIPPED | OUTPATIENT
Start: 2021-10-25 | End: 2022-12-13 | Stop reason: SDUPTHER

## 2021-11-30 ENCOUNTER — PATIENT MESSAGE (OUTPATIENT)
Dept: OPHTHALMOLOGY | Facility: CLINIC | Age: 61
End: 2021-11-30
Payer: COMMERCIAL

## 2021-11-30 ENCOUNTER — PATIENT MESSAGE (OUTPATIENT)
Dept: FAMILY MEDICINE | Facility: CLINIC | Age: 61
End: 2021-11-30
Payer: COMMERCIAL

## 2021-12-01 RX ORDER — MUPIROCIN 20 MG/G
OINTMENT TOPICAL
COMMUNITY
Start: 2021-11-29

## 2022-03-05 ENCOUNTER — PATIENT MESSAGE (OUTPATIENT)
Dept: OPHTHALMOLOGY | Facility: CLINIC | Age: 62
End: 2022-03-05
Payer: COMMERCIAL

## 2022-04-25 ENCOUNTER — TELEPHONE (OUTPATIENT)
Dept: OPHTHALMOLOGY | Facility: CLINIC | Age: 62
End: 2022-04-25
Payer: COMMERCIAL

## 2022-04-25 NOTE — TELEPHONE ENCOUNTER
Spoke with Mr. Coker about his right eye. He said he only see minimal floaters now but now his eye has been cloudy for a few weeks. He's tried tears but it is still cloudy. He want to be sure everything with his retina is ok. I informed Mr. Coker Dr. Brizuela is out for the day but I will check with him in the morning to see when he would like him to come in.     ----- Message from Iwona Doran sent at 4/25/2022  4:05 PM CDT -----  Contact: DELFINA COKER [[id number]]  .Type:  Needs Medical Advice    Who Called: DELFINA COKER [[id number]]  Would the patient rather a call back or a response via [facility]? Call   Best Call Back Number: .[phone] (home)    Additional Information: Pt is req a call back in regards to having and appointment scheduled to check his right eye retina.. Thanks  AW

## 2022-04-26 ENCOUNTER — PATIENT MESSAGE (OUTPATIENT)
Dept: OPHTHALMOLOGY | Facility: CLINIC | Age: 62
End: 2022-04-26
Payer: COMMERCIAL

## 2022-04-27 ENCOUNTER — PATIENT MESSAGE (OUTPATIENT)
Dept: OPHTHALMOLOGY | Facility: CLINIC | Age: 62
End: 2022-04-27
Payer: COMMERCIAL

## 2022-04-30 ENCOUNTER — PATIENT MESSAGE (OUTPATIENT)
Dept: OPHTHALMOLOGY | Facility: CLINIC | Age: 62
End: 2022-04-30
Payer: COMMERCIAL

## 2022-05-09 ENCOUNTER — PATIENT MESSAGE (OUTPATIENT)
Dept: FAMILY MEDICINE | Facility: CLINIC | Age: 62
End: 2022-05-09
Payer: COMMERCIAL

## 2022-05-10 ENCOUNTER — PATIENT OUTREACH (OUTPATIENT)
Dept: ADMINISTRATIVE | Facility: OTHER | Age: 62
End: 2022-05-10
Payer: COMMERCIAL

## 2022-05-10 NOTE — PROGRESS NOTES
Health Maintenance Due   Topic Date Due    COVID-19 Vaccine (1) Never done    Shingles Vaccine (1 of 2) Never done    Colorectal Cancer Screening  04/25/2022     Updates were requested from care everywhere.  Chart was reviewed for overdue [facility]active [facility] Encounters (KAY[facility]) topics (CRS, Breast Cancer Screening, Eye exam)  Health Maintenance has been updated.  LINKS immunization registry triggered.  Immunizations were reconciled.

## 2022-05-11 ENCOUNTER — OFFICE VISIT (OUTPATIENT)
Dept: OPHTHALMOLOGY | Facility: CLINIC | Age: 62
End: 2022-05-11
Payer: COMMERCIAL

## 2022-05-11 DIAGNOSIS — H04.123 DRY EYE SYNDROME, BILATERAL: Primary | ICD-10-CM

## 2022-05-11 DIAGNOSIS — H26.491 PCO (POSTERIOR CAPSULAR OPACIFICATION), RIGHT: ICD-10-CM

## 2022-05-11 DIAGNOSIS — Z83.518 FAMILY HISTORY OF RETINITIS PIGMENTOSA: ICD-10-CM

## 2022-05-11 DIAGNOSIS — H40.013 OPEN ANGLE WITH BORDERLINE FINDINGS OF BOTH EYES: ICD-10-CM

## 2022-05-11 DIAGNOSIS — Z96.1 PSEUDOPHAKIA OF BOTH EYES: ICD-10-CM

## 2022-05-11 PROCEDURE — 99214 OFFICE O/P EST MOD 30 MIN: CPT | Mod: S$GLB,,, | Performed by: OPHTHALMOLOGY

## 2022-05-11 PROCEDURE — 1159F PR MEDICATION LIST DOCUMENTED IN MEDICAL RECORD: ICD-10-PCS | Mod: CPTII,S$GLB,, | Performed by: OPHTHALMOLOGY

## 2022-05-11 PROCEDURE — 99999 PR PBB SHADOW E&M-EST. PATIENT-LVL III: CPT | Mod: PBBFAC,,, | Performed by: OPHTHALMOLOGY

## 2022-05-11 PROCEDURE — 99214 PR OFFICE/OUTPT VISIT, EST, LEVL IV, 30-39 MIN: ICD-10-PCS | Mod: S$GLB,,, | Performed by: OPHTHALMOLOGY

## 2022-05-11 PROCEDURE — 99999 PR PBB SHADOW E&M-EST. PATIENT-LVL III: ICD-10-PCS | Mod: PBBFAC,,, | Performed by: OPHTHALMOLOGY

## 2022-05-11 PROCEDURE — 1160F RVW MEDS BY RX/DR IN RCRD: CPT | Mod: CPTII,S$GLB,, | Performed by: OPHTHALMOLOGY

## 2022-05-11 PROCEDURE — 1160F PR REVIEW ALL MEDS BY PRESCRIBER/CLIN PHARMACIST DOCUMENTED: ICD-10-PCS | Mod: CPTII,S$GLB,, | Performed by: OPHTHALMOLOGY

## 2022-05-11 PROCEDURE — 1159F MED LIST DOCD IN RCRD: CPT | Mod: CPTII,S$GLB,, | Performed by: OPHTHALMOLOGY

## 2022-05-11 NOTE — PROGRESS NOTES
HPI     Dry Eye     Comments: Pt reports for dry eye check. Denies any pain or irritation. Va   stable, but having hazy/cloudy vision in OD. Using refresh omega prn. Not   having flashes of light anymore. Floaters occasionally.               Comments     1. PCIOL OS 12/6/2018   PCIOL OD +18.5 SN60WF / CDE:16.69 / 12/7/17 BLOCK    Coag susp- based on c/d asym.     H/O WET AMD OD -DAD  H/O DRY AMD OS -DAD  H/O RP-SIS            Last edited by Chidi Camargo on 5/11/2022  2:03 PM. (History)           Assessment /Plan     For exam results, see Encounter Report.      ICD-10-CM ICD-9-CM    1. Dry eye syndrome, bilateral  H04.123 375.15 Stale at this time on tears    2. PCO (posterior capsular opacification), right  H26.491 366.50 Mild, follow- can consider yag in the future if pt has increased sxs     3. Open angle with borderline findings of both eyes  H40.013 365.01 IOP elevated OD at this time - follow for now    4. Family history of retinitis pigmentosa  Z83.518 V19.19    5. Pseudophakia of both eyes  Z96.1 V43.1 Doing well        RETURN TO CLINIC 4 month DOA, HVF,

## 2022-06-03 ENCOUNTER — PATIENT MESSAGE (OUTPATIENT)
Dept: PRIMARY CARE CLINIC | Facility: CLINIC | Age: 62
End: 2022-06-03
Payer: COMMERCIAL

## 2022-06-03 ENCOUNTER — PATIENT MESSAGE (OUTPATIENT)
Dept: OPHTHALMOLOGY | Facility: CLINIC | Age: 62
End: 2022-06-03
Payer: COMMERCIAL

## 2022-06-15 ENCOUNTER — OFFICE VISIT (OUTPATIENT)
Dept: PRIMARY CARE CLINIC | Facility: CLINIC | Age: 62
End: 2022-06-15
Payer: COMMERCIAL

## 2022-06-15 VITALS
BODY MASS INDEX: 20.34 KG/M2 | HEART RATE: 99 BPM | HEIGHT: 72 IN | DIASTOLIC BLOOD PRESSURE: 60 MMHG | TEMPERATURE: 97 F | SYSTOLIC BLOOD PRESSURE: 108 MMHG | WEIGHT: 150.13 LBS

## 2022-06-15 DIAGNOSIS — E78.00 HYPERCHOLESTEREMIA: Chronic | ICD-10-CM

## 2022-06-15 DIAGNOSIS — Z23 NEED FOR SHINGLES VACCINE: ICD-10-CM

## 2022-06-15 DIAGNOSIS — H40.013 OPEN ANGLE WITH BORDERLINE FINDINGS OF BOTH EYES: ICD-10-CM

## 2022-06-15 DIAGNOSIS — Z00.00 ROUTINE GENERAL MEDICAL EXAMINATION AT A HEALTH CARE FACILITY: Primary | ICD-10-CM

## 2022-06-15 DIAGNOSIS — Z12.5 PROSTATE CANCER SCREENING: ICD-10-CM

## 2022-06-15 DIAGNOSIS — Z12.11 COLON CANCER SCREENING: ICD-10-CM

## 2022-06-15 PROCEDURE — 99396 PR PREVENTIVE VISIT,EST,40-64: ICD-10-PCS | Mod: 25,S$GLB,, | Performed by: FAMILY MEDICINE

## 2022-06-15 PROCEDURE — 90471 ZOSTER RECOMBINANT VACCINE: ICD-10-PCS | Mod: S$GLB,,, | Performed by: FAMILY MEDICINE

## 2022-06-15 PROCEDURE — 1159F PR MEDICATION LIST DOCUMENTED IN MEDICAL RECORD: ICD-10-PCS | Mod: CPTII,S$GLB,, | Performed by: FAMILY MEDICINE

## 2022-06-15 PROCEDURE — 3074F SYST BP LT 130 MM HG: CPT | Mod: CPTII,S$GLB,, | Performed by: FAMILY MEDICINE

## 2022-06-15 PROCEDURE — 3008F PR BODY MASS INDEX (BMI) DOCUMENTED: ICD-10-PCS | Mod: CPTII,S$GLB,, | Performed by: FAMILY MEDICINE

## 2022-06-15 PROCEDURE — 3078F DIAST BP <80 MM HG: CPT | Mod: CPTII,S$GLB,, | Performed by: FAMILY MEDICINE

## 2022-06-15 PROCEDURE — 99999 PR PBB SHADOW E&M-EST. PATIENT-LVL IV: ICD-10-PCS | Mod: PBBFAC,,, | Performed by: FAMILY MEDICINE

## 2022-06-15 PROCEDURE — 90471 IMMUNIZATION ADMIN: CPT | Mod: S$GLB,,, | Performed by: FAMILY MEDICINE

## 2022-06-15 PROCEDURE — 1159F MED LIST DOCD IN RCRD: CPT | Mod: CPTII,S$GLB,, | Performed by: FAMILY MEDICINE

## 2022-06-15 PROCEDURE — 3078F PR MOST RECENT DIASTOLIC BLOOD PRESSURE < 80 MM HG: ICD-10-PCS | Mod: CPTII,S$GLB,, | Performed by: FAMILY MEDICINE

## 2022-06-15 PROCEDURE — 3074F PR MOST RECENT SYSTOLIC BLOOD PRESSURE < 130 MM HG: ICD-10-PCS | Mod: CPTII,S$GLB,, | Performed by: FAMILY MEDICINE

## 2022-06-15 PROCEDURE — 90750 HZV VACC RECOMBINANT IM: CPT | Mod: S$GLB,,, | Performed by: FAMILY MEDICINE

## 2022-06-15 PROCEDURE — 99999 PR PBB SHADOW E&M-EST. PATIENT-LVL IV: CPT | Mod: PBBFAC,,, | Performed by: FAMILY MEDICINE

## 2022-06-15 PROCEDURE — 90750 ZOSTER RECOMBINANT VACCINE: ICD-10-PCS | Mod: S$GLB,,, | Performed by: FAMILY MEDICINE

## 2022-06-15 PROCEDURE — 3008F BODY MASS INDEX DOCD: CPT | Mod: CPTII,S$GLB,, | Performed by: FAMILY MEDICINE

## 2022-06-15 PROCEDURE — 99396 PREV VISIT EST AGE 40-64: CPT | Mod: 25,S$GLB,, | Performed by: FAMILY MEDICINE

## 2022-06-15 NOTE — PROGRESS NOTES
Subjective:      Patient ID: Kilo Dailey is a 61 y.o. male.    Chief Complaint: Annual Exam and Leg Pain    Disclaimer:  This note is prepared using voice recognition software and as such is likely to have errors and has not been proof read. Please contact me for questions.     Kilo Dailey is a 61 y.o. male here for annual exam.   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  Is very active.  Does work out at the [employer] with weights as well as yoga.  States healthy.  Does not usually get flu shots.  Does want to do a PSA screening test.  Is on Lipitor 10 tolerating well.  No problems with medication.   Willing to do shingrix today.     Patient Active Problem List:     Hypercholesteremia     BPH (benign prostatic hyperplasia)     Family history of retinitis pigmentosa     Pseudophakia of both eyes     Open angle with borderline findings of both eyes     Right shoulder pain     Decreased range of motion    Reviewed past medical surgical social and family history entered in by the patient.    No results found for: HGBA1C   Lab Results   Component Value Date    CHOL 191 07/27/2021    CHOL 179 08/27/2020    CHOL 176 05/10/2019     Lab Results   Component Value Date    LDLCALC 105.4 07/27/2021    LDLCALC 100.0 08/27/2020    LDLCALC 92.8 05/10/2019       Wt Readings from Last 10 Encounters:   06/15/22 68.1 kg (150 lb 2.1 oz)   08/27/21 66 kg (145 lb 8.1 oz)   07/16/21 65.8 kg (145 lb 1 oz)   08/27/20 68.9 kg (152 lb 0.1 oz)   05/29/20 68.9 kg (152 lb)   03/31/20 69.4 kg (153 lb)   01/27/20 69.5 kg (153 lb 3.5 oz)   05/10/19 67.9 kg (149 lb 11.1 oz)   04/12/18 68.2 kg (150 lb 5.7 oz)   04/10/18 68.2 kg (150 lb 5.7 oz)       The 10-year ASCVD risk score (Mey AMBRIZ Jr., et al., 2013) is: 5.9%    Values used to calculate the score:      Age: 61 years      Sex: Male      Is Non- : No      Diabetic: No      Tobacco smoker: No      Systolic Blood Pressure: 108 mmHg      Is BP treated: No       HDL Cholesterol: 69 mg/dL      Total Cholesterol: 191 mg/dL        Lab Results   Component Value Date    WBC 7.36 07/27/2021    HGB 15.1 07/27/2021    HCT 46.2 07/27/2021     07/27/2021    CHOL 191 07/27/2021    TRIG 83 07/27/2021    HDL 69 07/27/2021    ALT 17 07/27/2021    AST 19 07/27/2021     07/27/2021    K 5.0 07/27/2021     07/27/2021    CREATININE 1.0 07/27/2021    BUN 18 07/27/2021    CO2 26 07/27/2021    TSH 1.999 07/27/2021    PSA 1.8 07/27/2021       Posterior Segment OCT Retina-Both eyes  Right Eye  Quality was good. Scan locations included subfoveal, juxtafoveal,   extrafoveal, nasal, temporal, superior, inferior. Progression has been   stable. Findings include normal foveal contour.     Left Eye  Quality was good. Scan locations included subfoveal, juxtafoveal,   extrafoveal, nasal, temporal, superior, inferior. Progression has been   stable. Findings include normal foveal contour.     Notes  See progress note.        Review of Systems   Constitutional: Negative for activity change, appetite change, chills, fatigue and unexpected weight change.   HENT: Negative for congestion, ear pain, postnasal drip, sneezing, sore throat and trouble swallowing.    Eyes: Negative for pain and visual disturbance.   Respiratory: Negative for cough and shortness of breath.    Cardiovascular: Negative for chest pain and leg swelling.   Gastrointestinal: Negative for abdominal pain, constipation, diarrhea, nausea and vomiting.   Endocrine: Negative for cold intolerance and heat intolerance.   Genitourinary: Negative for difficulty urinating, dysuria and flank pain.   Musculoskeletal: Negative for arthralgias, back pain, joint swelling and neck pain.   Skin: Negative for color change and rash.   Neurological: Negative for dizziness, seizures and headaches.   Psychiatric/Behavioral: Negative for behavioral problems, dysphoric mood and sleep disturbance. The patient is not nervous/anxious.      Objective:      Vitals:    06/15/22 1416   BP: 108/60   Pulse: 99   Temp: 97.1 °F (36.2 °C)   Weight: 68.1 kg (150 lb 2.1 oz)   Height: 6' (1.829 m)     Physical Exam  Vitals reviewed.   Constitutional:       General: He is not in acute distress.     Appearance: Normal appearance. He is well-developed and normal weight.   HENT:      Head: Normocephalic and atraumatic.      Right Ear: Tympanic membrane and external ear normal.      Left Ear: Tympanic membrane and external ear normal.      Nose: Nose normal.      Mouth/Throat:      Mouth: Mucous membranes are moist.      Pharynx: Oropharynx is clear.   Eyes:      Conjunctiva/sclera: Conjunctivae normal.      Pupils: Pupils are equal, round, and reactive to light.   Neck:      Thyroid: No thyromegaly.   Cardiovascular:      Rate and Rhythm: Normal rate and regular rhythm.      Heart sounds: No murmur heard.    No friction rub. No gallop.   Pulmonary:      Effort: Pulmonary effort is normal. No respiratory distress.      Breath sounds: Normal breath sounds.   Abdominal:      General: Bowel sounds are normal. There is no distension.      Palpations: Abdomen is soft.      Tenderness: There is no abdominal tenderness. There is no rebound.   Musculoskeletal:         General: Normal range of motion.      Cervical back: Normal range of motion and neck supple.   Lymphadenopathy:      Cervical: No cervical adenopathy.   Skin:     General: Skin is warm and dry.   Neurological:      General: No focal deficit present.      Mental Status: He is alert and oriented to person, place, and time.      Coordination: Coordination normal.   Psychiatric:         Attention and Perception: Attention normal.         Mood and Affect: Mood and affect normal.         Speech: Speech normal.         Behavior: Behavior normal.         Thought Content: Thought content normal.         Cognition and Memory: Cognition normal.         Judgment: Judgment normal.       Assessment:     1. Routine general medical  examination at a health care facility    2. Hypercholesteremia    3. Colon cancer screening    4. Open angle with borderline findings of both eyes    5. Prostate cancer screening    6. Need for shingles vaccine      Plan:   Kilo was seen today for annual exam and leg pain.    Diagnoses and all orders for this visit:    Routine general medical examination at a health care facility - labs ordered. Discussed Health Maintenance issues.     -     Comprehensive Metabolic Panel; Future  -     Hemoglobin A1C; Future  -     CBC Auto Differential; Future  -     Lipid Panel; Future  -     TSH; Future  -     PSA, Screening; Future    Hypercholesteremia - stable, Continue with current medications and interventions. Labs ordered and will be reviewed.  Doing lipitor 10mg every other day. Discussed CT calcium scoring, declines today.     -     Comprehensive Metabolic Panel; Future  -     Hemoglobin A1C; Future  -     CBC Auto Differential; Future  -     Lipid Panel; Future  -     TSH; Future    Colon cancer screening  -     Ambulatory referral/consult to Endo Procedure ; Future    Open angle with borderline findings of both eyes    Prostate cancer screening  -     PSA, Screening; Future    Need for shingles vaccine- desires to do today   -     (In Office Administered) Zoster Recombinant Vaccine            Follow up in about 1 year (around 6/15/2023) for physical with Dr JONES.    There are no Patient Instructions on file for this visit.

## 2022-06-16 ENCOUNTER — LAB VISIT (OUTPATIENT)
Dept: LAB | Facility: HOSPITAL | Age: 62
End: 2022-06-16
Attending: PHYSICIAN ASSISTANT
Payer: COMMERCIAL

## 2022-06-16 DIAGNOSIS — Z12.5 PROSTATE CANCER SCREENING: ICD-10-CM

## 2022-06-16 DIAGNOSIS — E78.00 HYPERCHOLESTEREMIA: Chronic | ICD-10-CM

## 2022-06-16 DIAGNOSIS — Z00.00 ROUTINE GENERAL MEDICAL EXAMINATION AT A HEALTH CARE FACILITY: ICD-10-CM

## 2022-06-16 LAB
ALBUMIN SERPL BCP-MCNC: 4 G/DL (ref 3.5–5.2)
ALP SERPL-CCNC: 66 U/L (ref 55–135)
ALT SERPL W/O P-5'-P-CCNC: 15 U/L (ref 10–44)
ANION GAP SERPL CALC-SCNC: 10 MMOL/L (ref 8–16)
AST SERPL-CCNC: 18 U/L (ref 10–40)
BASOPHILS # BLD AUTO: 0.06 K/UL (ref 0–0.2)
BASOPHILS NFR BLD: 0.5 % (ref 0–1.9)
BILIRUB SERPL-MCNC: 1.6 MG/DL (ref 0.1–1)
BUN SERPL-MCNC: 18 MG/DL (ref 8–23)
CALCIUM SERPL-MCNC: 9.4 MG/DL (ref 8.7–10.5)
CHLORIDE SERPL-SCNC: 104 MMOL/L (ref 95–110)
CHOLEST SERPL-MCNC: 179 MG/DL (ref 120–199)
CHOLEST/HDLC SERPL: 2.7 {RATIO} (ref 2–5)
CO2 SERPL-SCNC: 26 MMOL/L (ref 23–29)
COMPLEXED PSA SERPL-MCNC: 1.8 NG/ML (ref 0–4)
CREAT SERPL-MCNC: 0.9 MG/DL (ref 0.5–1.4)
DIFFERENTIAL METHOD: ABNORMAL
EOSINOPHIL # BLD AUTO: 0.1 K/UL (ref 0–0.5)
EOSINOPHIL NFR BLD: 1.1 % (ref 0–8)
ERYTHROCYTE [DISTWIDTH] IN BLOOD BY AUTOMATED COUNT: 12.6 % (ref 11.5–14.5)
EST. GFR  (AFRICAN AMERICAN): >60 ML/MIN/1.73 M^2
EST. GFR  (NON AFRICAN AMERICAN): >60 ML/MIN/1.73 M^2
ESTIMATED AVG GLUCOSE: 105 MG/DL (ref 68–131)
GLUCOSE SERPL-MCNC: 98 MG/DL (ref 70–110)
HBA1C MFR BLD: 5.3 % (ref 4–5.6)
HCT VFR BLD AUTO: 46.7 % (ref 40–54)
HDLC SERPL-MCNC: 67 MG/DL (ref 40–75)
HDLC SERPL: 37.4 % (ref 20–50)
HGB BLD-MCNC: 15.1 G/DL (ref 14–18)
IMM GRANULOCYTES # BLD AUTO: 0.02 K/UL (ref 0–0.04)
IMM GRANULOCYTES NFR BLD AUTO: 0.2 % (ref 0–0.5)
LDLC SERPL CALC-MCNC: 98.2 MG/DL (ref 63–159)
LYMPHOCYTES # BLD AUTO: 1 K/UL (ref 1–4.8)
LYMPHOCYTES NFR BLD: 8.9 % (ref 18–48)
MCH RBC QN AUTO: 29.5 PG (ref 27–31)
MCHC RBC AUTO-ENTMCNC: 32.3 G/DL (ref 32–36)
MCV RBC AUTO: 91 FL (ref 82–98)
MONOCYTES # BLD AUTO: 0.9 K/UL (ref 0.3–1)
MONOCYTES NFR BLD: 8.3 % (ref 4–15)
NEUTROPHILS # BLD AUTO: 8.8 K/UL (ref 1.8–7.7)
NEUTROPHILS NFR BLD: 81 % (ref 38–73)
NONHDLC SERPL-MCNC: 112 MG/DL
NRBC BLD-RTO: 0 /100 WBC
PLATELET # BLD AUTO: 237 K/UL (ref 150–450)
PMV BLD AUTO: 10.7 FL (ref 9.2–12.9)
POTASSIUM SERPL-SCNC: 5 MMOL/L (ref 3.5–5.1)
PROT SERPL-MCNC: 6.5 G/DL (ref 6–8.4)
RBC # BLD AUTO: 5.12 M/UL (ref 4.6–6.2)
SODIUM SERPL-SCNC: 140 MMOL/L (ref 136–145)
TRIGL SERPL-MCNC: 69 MG/DL (ref 30–150)
TSH SERPL DL<=0.005 MIU/L-ACNC: 2.21 UIU/ML (ref 0.4–4)
WBC # BLD AUTO: 10.91 K/UL (ref 3.9–12.7)

## 2022-06-16 PROCEDURE — 36415 COLL VENOUS BLD VENIPUNCTURE: CPT | Mod: PN | Performed by: FAMILY MEDICINE

## 2022-06-16 PROCEDURE — 84443 ASSAY THYROID STIM HORMONE: CPT | Performed by: FAMILY MEDICINE

## 2022-06-16 PROCEDURE — 80061 LIPID PANEL: CPT | Performed by: FAMILY MEDICINE

## 2022-06-16 PROCEDURE — 85025 COMPLETE CBC W/AUTO DIFF WBC: CPT | Performed by: FAMILY MEDICINE

## 2022-06-16 PROCEDURE — 84153 ASSAY OF PSA TOTAL: CPT | Performed by: FAMILY MEDICINE

## 2022-06-16 PROCEDURE — 83036 HEMOGLOBIN GLYCOSYLATED A1C: CPT | Performed by: FAMILY MEDICINE

## 2022-06-16 PROCEDURE — 80053 COMPREHEN METABOLIC PANEL: CPT | Performed by: FAMILY MEDICINE

## 2022-06-20 ENCOUNTER — PATIENT MESSAGE (OUTPATIENT)
Dept: PRIMARY CARE CLINIC | Facility: CLINIC | Age: 62
End: 2022-06-20
Payer: COMMERCIAL

## 2022-06-23 ENCOUNTER — HOSPITAL ENCOUNTER (OUTPATIENT)
Dept: PREADMISSION TESTING | Facility: HOSPITAL | Age: 62
Discharge: HOME | End: 2022-06-23
Attending: FAMILY MEDICINE
Payer: COMMERCIAL

## 2022-06-23 DIAGNOSIS — Z12.11 COLON CANCER SCREENING: Primary | ICD-10-CM

## 2022-06-23 RX ORDER — POLYETHYLENE GLYCOL 3350, SODIUM SULFATE ANHYDROUS, SODIUM BICARBONATE, SODIUM CHLORIDE, POTASSIUM CHLORIDE 236; 22.74; 6.74; 5.86; 2.97 G/4L; G/4L; G/4L; G/4L; G/4L
4 POWDER, FOR SOLUTION ORAL ONCE
Qty: 4000 ML | Refills: 0 | Status: SHIPPED | OUTPATIENT
Start: 2022-06-23 | End: 2022-06-23

## 2022-07-21 ENCOUNTER — PATIENT MESSAGE (OUTPATIENT)
Dept: PRIMARY CARE CLINIC | Facility: CLINIC | Age: 62
End: 2022-07-21
Payer: COMMERCIAL

## 2022-08-08 ENCOUNTER — TELEPHONE (OUTPATIENT)
Dept: PREADMISSION TESTING | Facility: HOSPITAL | Age: 62
End: 2022-08-08
Payer: COMMERCIAL

## 2022-08-08 ENCOUNTER — PATIENT MESSAGE (OUTPATIENT)
Dept: PRIMARY CARE CLINIC | Facility: CLINIC | Age: 62
End: 2022-08-08
Payer: COMMERCIAL

## 2022-08-08 ENCOUNTER — PATIENT MESSAGE (OUTPATIENT)
Dept: ENDOSCOPY | Facility: HOSPITAL | Age: 62
End: 2022-08-08
Payer: COMMERCIAL

## 2022-08-08 NOTE — TELEPHONE ENCOUNTER
PAT call completed.  Patient educated on procedure instructions.  Medical history discussed and patient informed of arrival time of 7:00 AM on Thursday, August 11, 2022 at the [place], and was made aware of the limited-visitor policy, and that  is to remain during the entire visit.  All questions and concerns addressed.  Endoscopy instructions reviewed. Instructed no solid food, only clear liquids, the day before the procedure, then at 6 PM, the day before the procedure, drink the first half of the bowel prep, then at 2 AM, the morning of procedure, drink the second half of the prep, then do not eat or drink anything until after the procedure.  Additional prep instructions given including Dulcolax and Simethicone.    Rapid pre-procedure covid testing needed upon arrival. Patient verbalized understanding of all instructions.

## 2022-08-08 NOTE — PRE-PROCEDURE INSTRUCTIONS
PAT call completed.  Patient educated on procedure instructions.  Medical history discussed and patient informed of arrival time of 7:00 AM on Thursday, August 11, 2022 at the [place], and was made aware of the limited-visitor policy, and that  is to remain during the entire visit.  All questions and concerns addressed.  Endoscopy instructions reviewed. Instructed no solid food, only clear liquids, the day before the procedure, then at 6 PM, the day before the procedure, drink the first half of the bowel prep, then at 2 AM, the morning of procedure, drink the second half of the prep, then do not eat or drink anything until after the procedure.  Additional prep instructions given including Dulcolax and Simethicone.    Rapid pre-procedure covid testing needed upon arrival. Patient verbalized understanding of all instructions.

## 2022-08-08 NOTE — TELEPHONE ENCOUNTER
Primary care does NOT prescribe the bowel prep for the colonscopy, it is done with the standing order guidelines that should be built in to the endoscopy orders. Please reach out to the endoscopy group to assist the patient in getting his prep.     Lily, if you will, please adjust your smartphrase to NOT include the portion of primary care sending in the prep medications. Thanks.

## 2022-08-09 ENCOUNTER — PATIENT MESSAGE (OUTPATIENT)
Dept: PRIMARY CARE CLINIC | Facility: CLINIC | Age: 62
End: 2022-08-09
Payer: COMMERCIAL

## 2022-08-09 NOTE — TELEPHONE ENCOUNTER
I filled out form for Dr. Ball to review and signed.    Pt needs to come in to have his waist circumference measured as a nurse visit.

## 2022-08-09 NOTE — TELEPHONE ENCOUNTER
He can measure his waist at home if it is absolutely required for his form. Otherwise, can just scan and send back to him. Thanks.

## 2022-08-10 ENCOUNTER — ANESTHESIA EVENT (OUTPATIENT)
Dept: ENDOSCOPY | Facility: HOSPITAL | Age: 62
End: 2022-08-10
Payer: COMMERCIAL

## 2022-08-10 ENCOUNTER — PATIENT MESSAGE (OUTPATIENT)
Dept: PRIMARY CARE CLINIC | Facility: CLINIC | Age: 62
End: 2022-08-10
Payer: COMMERCIAL

## 2022-08-10 NOTE — ANESTHESIA PREPROCEDURE EVALUATION
08/10/2022  Kilo Dailey is a 61 y.o., male.  Normal sinus rhythm   Possible Left atrial enlargement   Borderline Abnormal ECG   No previous ECGs available   Confirmed by SADIE DYE, MARIA TERESA ([id number]) on 10/31/2014 8:36:40 PM   Echo 2014:  EKG Conclusions:     1. The EKG portion of this study is negative for ischemia at a moderate workload, and peak heart rate of 184 bpm (111% of predicted).   2. Exercise capacity is excellent.   3. Blood pressure response to exercise was hypertensive (Presenting BP: 116/74 Peak BP: 204/72).   4. No significant arrhythmias were present.   5. There were no symptoms of chest discomfort or significant dyspnea throughout the protocol.   6. The Duke treadmill score was 13 suggesting a low probability for future cardiovascular events.     Past Medical History:   Diagnosis Date    Allergic rhinitis     BPH (benign prostatic hyperplasia)     Colon polyps     Hyperlipidemia     Tennis elbow     Tinea versicolor      Past Surgical History:   Procedure Laterality Date    CATARACT EXTRACTION W/  INTRAOCULAR LENS IMPLANT Right 12/07/2017    CATARACT EXTRACTION W/  INTRAOCULAR LENS IMPLANT Left 12/06/2018    COLONOSCOPY N/A 4/25/2017    Procedure: COLONOSCOPY;  Surgeon: Edilberto Perez MD;  Location: [facility];  Service: Endoscopy;  Laterality: N/A;    EYE SURGERY  2 years ago    cataracts   No current facility-administered medications for this encounter.    Current Outpatient Medications:     atorvastatin (LIPITOR) 10 MG tablet, Take 1 tablet (10 mg total) by mouth once daily., Disp: 90 tablet, Rfl: 2    clobetasoL (TEMOVATE) 0.05 % external solution, Apply to affected areas of scalp 1-2 times daily as needed for itch, Disp: 50 mL, Rfl: 3    diclofenac sodium (VOLTAREN) 1 % Gel, Apply 2 g topically 4 (four) times daily., Disp: 1 Tube, Rfl: 5    erythromycin with ethanoL  (EMGEL) 2 % gel, AAA of face bid.  For rosacea/redness., Disp: 60 g, Rfl: 3    fluticasone propionate (CUTIVATE) 0.05 % cream, Apply topically 2 (two) times daily., Disp: 15 g, Rfl: 3    glucosamine-chondroitin 500-400 mg tablet, Take 1 tablet by mouth once daily., Disp: , Rfl:     ketoconazole (NIZORAL) 2 % shampoo, Apply to scalp & effected dampened skin areas, lather and leave on x 5 min, then rinse off.  Use daily x 3 days., Disp: 120 mL, Rfl: 0    ketoconazole 2 % Foam, AAA BID for rash on face, chest and back.  For seborrheic dermatitis and tinea versicolor., Disp: 50 Can, Rfl: 3    mupirocin (BACTROBAN) 2 % ointment, SMARTSIG:Sparingly Topical 3 Times Daily, Disp: , Rfl:     triamcinolone acetonide 0.025% (KENALOG) 0.025 % cream, Apply topically 2 (two) times daily as needed. Safe for use on face.  Use for < 2 weeks then stop., Disp: 30 g, Rfl: 1          Pre-op Assessment    I have reviewed the Patient Summary Reports.     I have reviewed the Nursing Notes. I have reviewed the NPO Status.   I have reviewed the Medications.     Review of Systems  Anesthesia Hx:  No problems with previous Anesthesia  Neg history of prior surgery. Denies Family Hx of Anesthesia complications.   Denies Personal Hx of Anesthesia complications.   Social:  Non-Smoker, Social Alcohol Use    Hematology/Oncology:  Hematology Normal   Oncology Normal     EENT/Dental:   chronic allergic rhinitis   Cardiovascular:   Denies MI.  Denies CAD.     Denies Angina. hyperlipidemia    Pulmonary:  Pulmonary Normal    Renal/:   BPH    Hepatic/GI:   Bowel Prep.    Musculoskeletal:  Musculoskeletal Normal    Neurological:  Neurology Normal    Endocrine:  Endocrine Normal    Dermatological:  Skin Normal    Psych:  Psychiatric Normal           Physical Exam  General: Well nourished, Cooperative, Alert and Oriented    Airway:  Mallampati: II   Mouth Opening: Normal  TM Distance: Normal  Tongue: Normal  Neck ROM: Normal  ROM    Dental:  Intact    Chest/Lungs:  Clear to auscultation, Normal Respiratory Rate    Heart:  Rate: Normal  Rhythm: Regular Rhythm        Anesthesia Plan  Type of Anesthesia, risks & benefits discussed:    Anesthesia Type: Gen Natural Airway  Intra-op Monitoring Plan: Standard ASA Monitors  Post Op Pain Control Plan: multimodal analgesia  Induction:  IV  Informed Consent: Informed consent signed with the Patient and all parties understand the risks and agree with anesthesia plan.  All questions answered. Patient consented to blood products? No  ASA Score: 2  Day of Surgery Review of History & Physical: H&P Update referred to the surgeon/provider.    Ready For Surgery From Anesthesia Perspective.     .

## 2022-08-11 ENCOUNTER — HOSPITAL ENCOUNTER (OUTPATIENT)
Facility: HOSPITAL | Age: 62
Discharge: HOME | End: 2022-08-11
Attending: SURGERY | Admitting: SURGERY
Payer: COMMERCIAL

## 2022-08-11 ENCOUNTER — ANESTHESIA (OUTPATIENT)
Dept: ENDOSCOPY | Facility: HOSPITAL | Age: 62
End: 2022-08-11
Payer: COMMERCIAL

## 2022-08-11 VITALS
TEMPERATURE: 98 F | WEIGHT: 149.38 LBS | OXYGEN SATURATION: 97 % | HEART RATE: 70 BPM | HEIGHT: 72 IN | SYSTOLIC BLOOD PRESSURE: 132 MMHG | RESPIRATION RATE: 19 BRPM | DIASTOLIC BLOOD PRESSURE: 73 MMHG | BODY MASS INDEX: 20.23 KG/M2

## 2022-08-11 DIAGNOSIS — Z12.11 COLON CANCER SCREENING: ICD-10-CM

## 2022-08-11 LAB
CTP QC/QA: YES
SARS-COV-2 AG RESP QL IA.RAPID: NEGATIVE

## 2022-08-11 PROCEDURE — 37000009 HC ANESTHESIA EA ADD 15 MINS: Performed by: SURGERY

## 2022-08-11 PROCEDURE — 25000003 PHARM REV CODE 250: Performed by: NURSE ANESTHETIST, CERTIFIED REGISTERED

## 2022-08-11 PROCEDURE — D9220A PRA ANESTHESIA: ICD-10-PCS | Mod: ANES,,, | Performed by: ANESTHESIOLOGY

## 2022-08-11 PROCEDURE — 37000008 HC ANESTHESIA 1ST 15 MINUTES: Performed by: SURGERY

## 2022-08-11 PROCEDURE — G0105 COLORECTAL SCRN; HI RISK IND: HCPCS | Mod: ,,, | Performed by: SURGERY

## 2022-08-11 PROCEDURE — D9220A PRA ANESTHESIA: ICD-10-PCS | Mod: CRNA,,, | Performed by: NURSE ANESTHETIST, CERTIFIED REGISTERED

## 2022-08-11 PROCEDURE — 63600175 PHARM REV CODE 636 W HCPCS: Performed by: SURGERY

## 2022-08-11 PROCEDURE — G0105 COLORECTAL SCRN; HI RISK IND: HCPCS | Performed by: SURGERY

## 2022-08-11 PROCEDURE — D9220A PRA ANESTHESIA: Mod: CRNA,,, | Performed by: NURSE ANESTHETIST, CERTIFIED REGISTERED

## 2022-08-11 PROCEDURE — D9220A PRA ANESTHESIA: Mod: ANES,,, | Performed by: ANESTHESIOLOGY

## 2022-08-11 PROCEDURE — G0105 COLORECTAL SCRN; HI RISK IND: ICD-10-PCS | Mod: ,,, | Performed by: SURGERY

## 2022-08-11 PROCEDURE — 63600175 PHARM REV CODE 636 W HCPCS: Performed by: NURSE ANESTHETIST, CERTIFIED REGISTERED

## 2022-08-11 RX ORDER — SODIUM CHLORIDE, SODIUM LACTATE, POTASSIUM CHLORIDE, CALCIUM CHLORIDE 600; 310; 30; 20 MG/100ML; MG/100ML; MG/100ML; MG/100ML
INJECTION, SOLUTION INTRAVENOUS CONTINUOUS
Status: DISCONTINUED | OUTPATIENT
Start: 2022-08-11 | End: 2022-08-11 | Stop reason: HOSPADM

## 2022-08-11 RX ORDER — PROPOFOL 10 MG/ML
VIAL (ML) INTRAVENOUS
Status: DISCONTINUED | OUTPATIENT
Start: 2022-08-11 | End: 2022-08-11

## 2022-08-11 RX ORDER — LIDOCAINE HYDROCHLORIDE 20 MG/ML
INJECTION, SOLUTION EPIDURAL; INFILTRATION; INTRACAUDAL; PERINEURAL
Status: DISCONTINUED | OUTPATIENT
Start: 2022-08-11 | End: 2022-08-11

## 2022-08-11 RX ADMIN — PROPOFOL 70 MG: 10 INJECTION, EMULSION INTRAVENOUS at 09:08

## 2022-08-11 RX ADMIN — PROPOFOL 10 MG: 10 INJECTION, EMULSION INTRAVENOUS at 09:08

## 2022-08-11 RX ADMIN — PROPOFOL 30 MG: 10 INJECTION, EMULSION INTRAVENOUS at 09:08

## 2022-08-11 RX ADMIN — LIDOCAINE HYDROCHLORIDE 70 MG: 20 INJECTION, SOLUTION EPIDURAL; INFILTRATION; INTRACAUDAL; PERINEURAL at 09:08

## 2022-08-11 RX ADMIN — PROPOFOL 40 MG: 10 INJECTION, EMULSION INTRAVENOUS at 09:08

## 2022-08-11 RX ADMIN — SODIUM CHLORIDE, SODIUM LACTATE, POTASSIUM CHLORIDE, AND CALCIUM CHLORIDE: .6; .31; .03; .02 INJECTION, SOLUTION INTRAVENOUS at 07:08

## 2022-08-11 NOTE — PLAN OF CARE
Discharge instructions reviewed with pt and family, handouts given, verbalized understanding with no further questions at this time. Dr. Benitez spoke to pt at bedside, reviewed procedure and answered questions, MD telephone number provided per AVS sheet. No pain or nausea noted, tolerating po fluids without difficulty, no other complaints noted. Fall precautions reviewed, consents in chart, PIV to be removed at discharge.

## 2022-08-11 NOTE — H&P
Endoscopy H&P    Procedure : Colonoscopy       personal history of colon polyps and most recent endoscopic exam 5 years ago      Past Medical History:   Diagnosis Date    Allergic rhinitis     BPH (benign prostatic hyperplasia)     Colon polyps     Hyperlipidemia     Tennis elbow     Tinea versicolor      Past Surgical History:   Procedure Laterality Date    CATARACT EXTRACTION W/  INTRAOCULAR LENS IMPLANT Right 12/07/2017    CATARACT EXTRACTION W/  INTRAOCULAR LENS IMPLANT Left 12/06/2018    COLONOSCOPY N/A 4/25/2017    Procedure: COLONOSCOPY;  Surgeon: Edilberto Perez MD;  Location: [facility];  Service: Endoscopy;  Laterality: N/A;    EYE SURGERY  2 years ago    cataracts     No current facility-administered medications on file prior to encounter.     Current Outpatient Medications on File Prior to Encounter   Medication Sig Dispense Refill    atorvastatin (LIPITOR) 10 MG tablet Take 1 tablet (10 mg total) by mouth once daily. 90 tablet 2    clobetasoL (TEMOVATE) 0.05 % external solution Apply to affected areas of scalp 1-2 times daily as needed for itch 50 mL 3    diclofenac sodium (VOLTAREN) 1 % Gel Apply 2 g topically 4 (four) times daily. 1 Tube 5    erythromycin with ethanoL (EMGEL) 2 % gel AAA of face bid.  For rosacea/redness. 60 g 3    fluticasone propionate (CUTIVATE) 0.05 % cream Apply topically 2 (two) times daily. 15 g 3    glucosamine-chondroitin 500-400 mg tablet Take 1 tablet by mouth once daily.      ketoconazole (NIZORAL) 2 % shampoo Apply to scalp & effected dampened skin areas, lather and leave on x 5 min, then rinse off.  Use daily x 3 days. 120 mL 0    ketoconazole 2 % Foam AAA BID for rash on face, chest and back.  For seborrheic dermatitis and tinea versicolor. 50 Can 3    mupirocin (BACTROBAN) 2 % ointment SMARTSIG:Sparingly Topical 3 Times Daily      triamcinolone acetonide 0.025% (KENALOG) 0.025 % cream Apply topically 2 (two) times daily as needed. Safe for use  on face.  Use for < 2 weeks then stop. 30 g 1     Review of patient's allergies indicates:  No Known Allergies      Review of Systems -ROS:  GENERAL: No fever, chills, fatigability or weight loss.  CHEST: Denies TYSON, cyanosis, wheezing, cough and sputum production.  CARDIOVASCULAR: Denies chest pain, PND, orthopnea or reduced exercise tolerance.   Musculoskeletal ROS: negative for - gait disturbance or joint pain  Neurological ROS: negative for - confusion or memory loss        Physical Exam:  General: well developed, well nourished, no distress  Head: normocephalic  Neck: supple, symmetrical, trachea midline  Lungs:  clear to auscultation bilaterally and normal respiratory effort  Heart: regular rate and rhythm, S1, S2 normal, no murmur, rub or gallop and regular rate and rhythm  Abdomen: soft, non-tender non-distented; bowel sounds normal; no masses,  no organomegaly  Extremities: no cyanosis or edema, or clubbing       Moderate Sedation (choice): Mallampati Score 1    ASA : II    IMP: personal history of colon polyps and most recent endoscopic exam 5 years ago    Plan: Colonoscopy with Moderate sedation.  I have explained the procedure including indications, alternatives, expected outcomes and potential complications. The patient appears to understand and gives informed consent. The patient is medically ready for surgery.

## 2022-08-11 NOTE — [employer]OVATION PATIENT INSTRUCTIONS
Discharge Summary/Instructions after an Endoscopic Procedure  Patient Name: Kilo Dailey  Patient MRN: [id number]  Patient YOB: 1960 Thursday, August 11, 2022  Les Benitez MD  Dear patient,  As a result of recent federal legislation (The Federal Cures Act), you may   receive lab or pathology results from your procedure in your [facility]   account before your physician is able to contact you. Your physician or   their representative will relay the results to you with their   recommendations at their soonest availability.  Thank you,  RESTRICTIONS:  During your procedure today, you received medications for sedation.  These   medications may affect your judgment, balance and coordination.  Therefore,   for 24 hours, you have the following restrictions:   - DO NOT drive a car, operate machinery, make legal/financial decisions,   sign important papers or drink alcohol.    ACTIVITY:  Today: no heavy lifting, straining or running due to procedural   sedation/anesthesia.  The following day: return to full activity including work.  DIET:  Eat and drink normally unless instructed otherwise.     TREATMENT FOR COMMON SIDE EFFECTS:  - Mild abdominal pain, nausea, belching, bloating or excessive gas:  rest,   eat lightly and use a heating pad.  - Sore Throat: treat with throat lozenges and/or gargle with warm salt   water.  - Because air was used during the procedure, expelling large amounts of air   from your rectum or belching is normal.  - If a bowel prep was taken, you may not have a bowel movement for 1-3 days.    This is normal.  SYMPTOMS TO WATCH FOR AND REPORT TO YOUR PHYSICIAN:  1. Abdominal pain or bloating, other than gas cramps.  2. Chest pain.  3. Back pain.  4. Signs of infection such as: chills or fever occurring within 24 hours   after the procedure.  5. Rectal bleeding, which would show as bright red, maroon, or black stools.   (A tablespoon of blood from the rectum is not serious, especially  if   hemorrhoids are present.)  6. Vomiting.  7. Weakness or dizziness.  GO DIRECTLY TO THE NEAREST EMERGENCY ROOM IF YOU HAVE ANY OF THE FOLLOWING:      Difficulty breathing              Chills and/or fever over 101 F   Persistent vomiting and/or vomiting blood   Severe abdominal pain   Severe chest pain   Black, tarry stools   Bleeding- more than one tablespoon   Any other symptom or condition that you feel may need urgent attention  Your doctor recommends these additional instructions:  If any biopsies were taken, your doctors clinic will contact you in 1 to 2   weeks with any results.  - Patient has a contact number available for emergencies.  The signs and   symptoms of potential delayed complications were discussed with the   patient.  Return to normal activities tomorrow.  Written discharge   instructions were provided to the patient.   - Resume previous diet.   - Continue present medications.   - Repeat colonoscopy in 5 years for surveillance.   - Return to referring physician as previously scheduled.   - Discharge patient to home.  For questions, problems or results please call your physician Les Benitez MD at Work:  [phone]  If you have any questions about the above instructions, call the GI   department at [phone] or call the endoscopy unit at [phone]   from 7am until 3 pm.  [facility] [place], EMERGENCY ROOM PHONE NUMBER:   [phone]  IF A COMPLICATION OR EMERGENCY SITUATION ARISES AND YOU ARE UNABLE TO REACH   YOUR PHYSICIAN - GO DIRECTLY TO THE EMERGENCY ROOM.  I have read or have had read to me these discharge instructions for my   procedure and have received a written copy.  I understand these   instructions and will follow-up with my physician if I have any questions.     __________________________________       _____________________________________  Nurse Signature                                          Patient/Designated   Responsible Party  Signature  Les Benitez MD  8/11/2022 9:28:51 AM  This report has been verified and signed electronically.  Dear patient,  As a result of recent federal legislation (The Federal Cures Act), you may   receive lab or pathology results from your procedure in your [facility]   account before your physician is able to contact you. Your physician or   their representative will relay the results to you with their   recommendations at their soonest availability.  Thank you,  PROVATION

## 2022-08-11 NOTE — DISCHARGE SUMMARY
[facility] - Endoscopy 1st Fl  Discharge Note  Short Stay    Procedure(s) (LRB):  COLONOSCOPY (N/A)    OUTCOME: Patient tolerated treatment/procedure well without complication and is now ready for discharge.    DISPOSITION: Home or Self Care    FINAL DIAGNOSIS:  Colon cancer screening    FOLLOWUP: None    DISCHARGE INSTRUCTIONS:    Discharge Procedure Orders   Activity as tolerated        TIME SPENT ON DISCHARGE: 30 minutes

## 2022-08-11 NOTE — ANESTHESIA POSTPROCEDURE EVALUATION
Anesthesia Post Evaluation    Patient: Kilo Dailey    Procedure(s) Performed: Procedure(s) (LRB):  COLONOSCOPY (N/A)    Final Anesthesia Type: general      Patient location during evaluation: PACU  Patient participation: Yes- Able to Participate  Level of consciousness: awake and alert and oriented  Post-procedure vital signs: reviewed and stable  Pain management: adequate  Airway patency: patent    PONV status at discharge: No PONV  Anesthetic complications: no      Cardiovascular status: blood pressure returned to baseline, stable and hemodynamically stable  Respiratory status: unassisted  Hydration status: euvolemic  Follow-up not needed.          Vitals Value Taken Time   /73 08/11/22 1002   Temp 36.5 °C (97.7 °F) 08/11/22 0932   Pulse 70 08/11/22 1002   Resp 19 08/11/22 1002   SpO2 97 % 08/11/22 1002         Event Time   Out of Recovery 10:05:00         Pain/Alxeander Score: Alexander Score: 10 (8/11/2022 10:02 AM)

## 2022-08-11 NOTE — TRANSFER OF CARE
Anesthesia Transfer of Care Note    Patient: Kilo Dailey    Procedure(s) Performed: Procedure(s) (LRB):  COLONOSCOPY (N/A)    Patient location: PACU    Anesthesia Type: general    Transport from OR: Transported from OR on room air with adequate spontaneous ventilation    Post pain: adequate analgesia    Post assessment: no apparent anesthetic complications and tolerated procedure well    Post vital signs: stable    Level of consciousness: awake, alert and oriented    Nausea/Vomiting: no nausea/vomiting    Complications: none    Transfer of care protocol was followedComments: Explained to patient that IV infiltrated during procedure and sedation had to be interrupted until new iv access could be obtained.  Patient expressed understanding and stated he had no pain and appreciated care and comfort measures/      Last vitals:   Visit Vitals  /75 (BP Location: Left arm, Patient Position: Lying)   Pulse 63   Temp 36.5 °C (97.7 °F) (Temporal)   Resp 17   Ht 6' (1.829 m)   Wt 67.8 kg (149 lb 5.8 oz)   SpO2 100%   BMI 20.26 kg/m²

## 2022-09-13 ENCOUNTER — OFFICE VISIT (OUTPATIENT)
Dept: OPHTHALMOLOGY | Facility: CLINIC | Age: 62
End: 2022-09-13
Payer: COMMERCIAL

## 2022-09-13 DIAGNOSIS — Z83.518 FAMILY HISTORY OF RETINITIS PIGMENTOSA: ICD-10-CM

## 2022-09-13 DIAGNOSIS — Z96.1 PSEUDOPHAKIA OF BOTH EYES: ICD-10-CM

## 2022-09-13 DIAGNOSIS — H04.123 DRY EYE SYNDROME, BILATERAL: ICD-10-CM

## 2022-09-13 DIAGNOSIS — H26.491 PCO (POSTERIOR CAPSULAR OPACIFICATION), RIGHT: Primary | ICD-10-CM

## 2022-09-13 DIAGNOSIS — H40.013 OPEN ANGLE WITH BORDERLINE FINDINGS OF BOTH EYES: ICD-10-CM

## 2022-09-13 PROCEDURE — 66821 YAG CAPSULOTOMY - OD - RIGHT EYE: ICD-10-PCS | Mod: RT,S$GLB,, | Performed by: OPHTHALMOLOGY

## 2022-09-13 PROCEDURE — 99214 PR OFFICE/OUTPT VISIT, EST, LEVL IV, 30-39 MIN: ICD-10-PCS | Mod: 25,S$GLB,, | Performed by: OPHTHALMOLOGY

## 2022-09-13 PROCEDURE — 1159F MED LIST DOCD IN RCRD: CPT | Mod: CPTII,S$GLB,, | Performed by: OPHTHALMOLOGY

## 2022-09-13 PROCEDURE — 99214 OFFICE O/P EST MOD 30 MIN: CPT | Mod: 25,S$GLB,, | Performed by: OPHTHALMOLOGY

## 2022-09-13 PROCEDURE — 92083 HUMPHREY VISUAL FIELD - OU - BOTH EYES: ICD-10-PCS | Mod: S$GLB,,, | Performed by: OPHTHALMOLOGY

## 2022-09-13 PROCEDURE — 99999 PR PBB SHADOW E&M-EST. PATIENT-LVL III: CPT | Mod: PBBFAC,,, | Performed by: OPHTHALMOLOGY

## 2022-09-13 PROCEDURE — 99999 PR PBB SHADOW E&M-EST. PATIENT-LVL III: ICD-10-PCS | Mod: PBBFAC,,, | Performed by: OPHTHALMOLOGY

## 2022-09-13 PROCEDURE — 3044F PR MOST RECENT HEMOGLOBIN A1C LEVEL <7.0%: ICD-10-PCS | Mod: CPTII,S$GLB,, | Performed by: OPHTHALMOLOGY

## 2022-09-13 PROCEDURE — 1159F PR MEDICATION LIST DOCUMENTED IN MEDICAL RECORD: ICD-10-PCS | Mod: CPTII,S$GLB,, | Performed by: OPHTHALMOLOGY

## 2022-09-13 PROCEDURE — 1160F PR REVIEW ALL MEDS BY PRESCRIBER/CLIN PHARMACIST DOCUMENTED: ICD-10-PCS | Mod: CPTII,S$GLB,, | Performed by: OPHTHALMOLOGY

## 2022-09-13 PROCEDURE — 3044F HG A1C LEVEL LT 7.0%: CPT | Mod: CPTII,S$GLB,, | Performed by: OPHTHALMOLOGY

## 2022-09-13 PROCEDURE — 92083 EXTENDED VISUAL FIELD XM: CPT | Mod: S$GLB,,, | Performed by: OPHTHALMOLOGY

## 2022-09-13 PROCEDURE — 1160F RVW MEDS BY RX/DR IN RCRD: CPT | Mod: CPTII,S$GLB,, | Performed by: OPHTHALMOLOGY

## 2022-09-13 PROCEDURE — 66821 AFTER CATARACT LASER SURGERY: CPT | Mod: RT,S$GLB,, | Performed by: OPHTHALMOLOGY

## 2022-09-13 RX ORDER — PREDNISOLONE ACETATE 10 MG/ML
1 SUSPENSION/ DROPS OPHTHALMIC 4 TIMES DAILY
Qty: 5 ML | Refills: 0 | Status: SHIPPED | OUTPATIENT
Start: 2022-09-13 | End: 2022-09-20

## 2022-09-13 NOTE — PROGRESS NOTES
HPI     Glaucoma            Comments: Patient reports for 4 month IOP check with [facility]. Denies pain or   irritation at this time. Patient reports decreased vision OD - trouble   with cloudy vision during reading and driving. +glare.             Comments    1. PCIOL OS 12/6/2018   PCIOL OD +18.5 SN60WF / CDE:16.69 / 12/7/17 BLOCK    Coag susp- based on c/d asym.     H/O WET AMD OD -DAD  H/O DRY AMD OS -DAD  H/O RP-SI    Refresh [place] prn  Fish oil            Last edited by Chidi Matthew MD on 9/13/2022  8:11 AM.            Assessment /Plan     For exam results, see Encounter Report.      ICD-10-CM ICD-9-CM    1. PCO (posterior capsular opacification), right  H26.491 366.50 Clinically significant with increased difficulty with reading and glare.  Risk, benefits, and alternatives of Yag capsulotomy discussed.    Yag Laser treatment to the right eye.               2. Open angle with borderline findings of both eyes  H40.013 365.01 Zarate Visual Field - OU - Extended - Both Eyes stable and normal  Iop controlled and stable CDR OU      3. Pseudophakia of both eyes  Z96.1 V43.1       4. Dry eye syndrome, bilateral  H04.123 375.15 Using tears       5. Family history of retinitis pigmentosa  Z83.518 V19.19           Return to clinic 1 week for post yag check and one year later with gOCT

## 2022-09-17 ENCOUNTER — PATIENT MESSAGE (OUTPATIENT)
Dept: PRIMARY CARE CLINIC | Facility: CLINIC | Age: 62
End: 2022-09-17
Payer: COMMERCIAL

## 2022-09-21 ENCOUNTER — OFFICE VISIT (OUTPATIENT)
Dept: OPHTHALMOLOGY | Facility: CLINIC | Age: 62
End: 2022-09-21
Payer: COMMERCIAL

## 2022-09-21 DIAGNOSIS — H40.013 OPEN ANGLE WITH BORDERLINE FINDINGS OF BOTH EYES: ICD-10-CM

## 2022-09-21 DIAGNOSIS — Z98.890 S/P YAG CAPSULOTOMY, RIGHT: Primary | ICD-10-CM

## 2022-09-21 PROCEDURE — 1160F PR REVIEW ALL MEDS BY PRESCRIBER/CLIN PHARMACIST DOCUMENTED: ICD-10-PCS | Mod: CPTII,S$GLB,, | Performed by: OPHTHALMOLOGY

## 2022-09-21 PROCEDURE — 92133 POSTERIOR SEGMENT OCT OPTIC NERVE(OCULAR COHERENCE TOMOGRAPHY) - OU - BOTH EYES: ICD-10-PCS | Mod: S$GLB,,, | Performed by: OPHTHALMOLOGY

## 2022-09-21 PROCEDURE — 1160F RVW MEDS BY RX/DR IN RCRD: CPT | Mod: CPTII,S$GLB,, | Performed by: OPHTHALMOLOGY

## 2022-09-21 PROCEDURE — 1159F PR MEDICATION LIST DOCUMENTED IN MEDICAL RECORD: ICD-10-PCS | Mod: CPTII,S$GLB,, | Performed by: OPHTHALMOLOGY

## 2022-09-21 PROCEDURE — 99999 PR PBB SHADOW E&M-EST. PATIENT-LVL III: ICD-10-PCS | Mod: PBBFAC,,, | Performed by: OPHTHALMOLOGY

## 2022-09-21 PROCEDURE — 1159F MED LIST DOCD IN RCRD: CPT | Mod: CPTII,S$GLB,, | Performed by: OPHTHALMOLOGY

## 2022-09-21 PROCEDURE — 99999 PR PBB SHADOW E&M-EST. PATIENT-LVL III: CPT | Mod: PBBFAC,,, | Performed by: OPHTHALMOLOGY

## 2022-09-21 PROCEDURE — 3044F PR MOST RECENT HEMOGLOBIN A1C LEVEL <7.0%: ICD-10-PCS | Mod: CPTII,S$GLB,, | Performed by: OPHTHALMOLOGY

## 2022-09-21 PROCEDURE — 92133 CPTRZD OPH DX IMG PST SGM ON: CPT | Mod: S$GLB,,, | Performed by: OPHTHALMOLOGY

## 2022-09-21 PROCEDURE — 3044F HG A1C LEVEL LT 7.0%: CPT | Mod: CPTII,S$GLB,, | Performed by: OPHTHALMOLOGY

## 2022-09-21 PROCEDURE — 99024 POSTOP FOLLOW-UP VISIT: CPT | Mod: S$GLB,,, | Performed by: OPHTHALMOLOGY

## 2022-09-21 PROCEDURE — 99024 PR POST-OP FOLLOW-UP VISIT: ICD-10-PCS | Mod: S$GLB,,, | Performed by: OPHTHALMOLOGY

## 2022-09-21 NOTE — PROGRESS NOTES
HPI     Post-op Evaluation            Comments: Pt states VA is stable, no pain but having floaters since YAG.   Pt states compliance with pred qid           Comments    1. PCIOL OS 12/6/2018   PCIOL OD +18.5 SN60WF / CDE:16.69 / 12/7/17 BLOCK    Coag susp- based on c/d asym.     GCL 30 OU --- 08/04/21            H/O WET AMD OD -DAD  H/O DRY AMD OS -DAD  H/O RP-SI    Refresh [place] prn  Fish oil            Last edited by Waylon Sutton on 9/21/2022  7:43 AM.            Assessment /Plan     For exam results, see Encounter Report.      ICD-10-CM ICD-9-CM    1. S/P YAG capsulotomy, right  Z98.890 V45.89 Doing well post YAG, monitor yearly.     Defers MRx today      2. Open angle with borderline findings of both eyes  H40.013 365.01 Posterior Segment OCT Optic Nerve- Both eyes- performed today, stable findings OU    Doing well - intraocular pressure is within acceptable range relative to target pressure with no evidence of progression.   Continue current treatment.  Reviewed importance of continued compliance with treatment and follow up.           Return to clinic 1 year or PRN

## 2022-11-10 ENCOUNTER — PATIENT MESSAGE (OUTPATIENT)
Dept: DERMATOLOGY | Facility: CLINIC | Age: 62
End: 2022-11-10
Payer: COMMERCIAL

## 2022-11-19 ENCOUNTER — PATIENT MESSAGE (OUTPATIENT)
Dept: DERMATOLOGY | Facility: CLINIC | Age: 62
End: 2022-11-19
Payer: COMMERCIAL

## 2022-11-25 ENCOUNTER — TELEPHONE (OUTPATIENT)
Dept: PRIMARY CARE CLINIC | Facility: CLINIC | Age: 62
End: 2022-11-25

## 2022-11-25 ENCOUNTER — CLINICAL SUPPORT (OUTPATIENT)
Dept: PRIMARY CARE CLINIC | Facility: CLINIC | Age: 62
End: 2022-11-25
Payer: COMMERCIAL

## 2022-11-25 ENCOUNTER — TELEPHONE (OUTPATIENT)
Dept: PRIMARY CARE CLINIC | Facility: CLINIC | Age: 62
End: 2022-11-25
Payer: COMMERCIAL

## 2022-11-25 DIAGNOSIS — Z23 NEED FOR SHINGLES VACCINE: Primary | ICD-10-CM

## 2022-11-25 PROCEDURE — 90750 HZV VACC RECOMBINANT IM: CPT | Mod: S$GLB,,, | Performed by: FAMILY MEDICINE

## 2022-11-25 PROCEDURE — 90471 ZOSTER RECOMBINANT VACCINE: ICD-10-PCS | Mod: S$GLB,,, | Performed by: FAMILY MEDICINE

## 2022-11-25 PROCEDURE — 90750 ZOSTER RECOMBINANT VACCINE: ICD-10-PCS | Mod: S$GLB,,, | Performed by: FAMILY MEDICINE

## 2022-11-25 PROCEDURE — 90471 IMMUNIZATION ADMIN: CPT | Mod: S$GLB,,, | Performed by: FAMILY MEDICINE

## 2022-11-25 PROCEDURE — 99999 PR PBB SHADOW E&M-EST. PATIENT-LVL I: CPT | Mod: PBBFAC,,,

## 2022-11-25 PROCEDURE — 99999 PR PBB SHADOW E&M-EST. PATIENT-LVL I: ICD-10-PCS | Mod: PBBFAC,,,

## 2022-11-25 NOTE — TELEPHONE ENCOUNTER
----- Message from Lily Vane sent at 11/25/2022 11:31 AM CST -----  Contact: pt  Pt is calling in regard to get his 2nd shingle shot on today if p;possible, he is in the area of [employer].  Please call him [phone]

## 2022-12-13 ENCOUNTER — OFFICE VISIT (OUTPATIENT)
Dept: DERMATOLOGY | Facility: CLINIC | Age: 62
End: 2022-12-13
Payer: COMMERCIAL

## 2022-12-13 DIAGNOSIS — L21.9 SEBORRHEIC DERMATITIS: ICD-10-CM

## 2022-12-13 DIAGNOSIS — L82.1 SEBORRHEIC KERATOSES: ICD-10-CM

## 2022-12-13 DIAGNOSIS — L71.9 ROSACEA: Primary | ICD-10-CM

## 2022-12-13 PROCEDURE — 99999 PR PBB SHADOW E&M-EST. PATIENT-LVL III: ICD-10-PCS | Mod: PBBFAC,,, | Performed by: STUDENT IN AN ORGANIZED HEALTH CARE EDUCATION/TRAINING PROGRAM

## 2022-12-13 PROCEDURE — 1159F PR MEDICATION LIST DOCUMENTED IN MEDICAL RECORD: ICD-10-PCS | Mod: CPTII,S$GLB,, | Performed by: STUDENT IN AN ORGANIZED HEALTH CARE EDUCATION/TRAINING PROGRAM

## 2022-12-13 PROCEDURE — 1160F PR REVIEW ALL MEDS BY PRESCRIBER/CLIN PHARMACIST DOCUMENTED: ICD-10-PCS | Mod: CPTII,S$GLB,, | Performed by: STUDENT IN AN ORGANIZED HEALTH CARE EDUCATION/TRAINING PROGRAM

## 2022-12-13 PROCEDURE — 99214 OFFICE O/P EST MOD 30 MIN: CPT | Mod: S$GLB,,, | Performed by: STUDENT IN AN ORGANIZED HEALTH CARE EDUCATION/TRAINING PROGRAM

## 2022-12-13 PROCEDURE — 3044F PR MOST RECENT HEMOGLOBIN A1C LEVEL <7.0%: ICD-10-PCS | Mod: CPTII,S$GLB,, | Performed by: STUDENT IN AN ORGANIZED HEALTH CARE EDUCATION/TRAINING PROGRAM

## 2022-12-13 PROCEDURE — 99999 PR PBB SHADOW E&M-EST. PATIENT-LVL III: CPT | Mod: PBBFAC,,, | Performed by: STUDENT IN AN ORGANIZED HEALTH CARE EDUCATION/TRAINING PROGRAM

## 2022-12-13 PROCEDURE — 1160F RVW MEDS BY RX/DR IN RCRD: CPT | Mod: CPTII,S$GLB,, | Performed by: STUDENT IN AN ORGANIZED HEALTH CARE EDUCATION/TRAINING PROGRAM

## 2022-12-13 PROCEDURE — 3044F HG A1C LEVEL LT 7.0%: CPT | Mod: CPTII,S$GLB,, | Performed by: STUDENT IN AN ORGANIZED HEALTH CARE EDUCATION/TRAINING PROGRAM

## 2022-12-13 PROCEDURE — 99214 PR OFFICE/OUTPT VISIT, EST, LEVL IV, 30-39 MIN: ICD-10-PCS | Mod: S$GLB,,, | Performed by: STUDENT IN AN ORGANIZED HEALTH CARE EDUCATION/TRAINING PROGRAM

## 2022-12-13 PROCEDURE — 1159F MED LIST DOCD IN RCRD: CPT | Mod: CPTII,S$GLB,, | Performed by: STUDENT IN AN ORGANIZED HEALTH CARE EDUCATION/TRAINING PROGRAM

## 2022-12-13 RX ORDER — CLOBETASOL PROPIONATE 0.46 MG/ML
SOLUTION TOPICAL
Qty: 50 ML | Refills: 11 | Status: SHIPPED | OUTPATIENT
Start: 2022-12-13 | End: 2023-01-23 | Stop reason: ALTCHOICE

## 2022-12-13 RX ORDER — DOXYCYCLINE HYCLATE 100 MG
TABLET ORAL
Qty: 30 TABLET | Refills: 2 | Status: SHIPPED | OUTPATIENT
Start: 2022-12-13 | End: 2023-06-27

## 2022-12-13 RX ORDER — METRONIDAZOLE 10 MG/G
GEL TOPICAL DAILY
Qty: 60 G | Refills: 11 | Status: SHIPPED | OUTPATIENT
Start: 2022-12-13 | End: 2023-01-23 | Stop reason: ALTCHOICE

## 2022-12-13 RX ORDER — ERYTHROMYCIN 20 MG/G
GEL TOPICAL
Qty: 60 G | Refills: 11 | Status: SHIPPED | OUTPATIENT
Start: 2022-12-13 | End: 2023-01-23 | Stop reason: ALTCHOICE

## 2022-12-13 RX ORDER — KETOCONAZOLE 20 MG/ML
SHAMPOO, SUSPENSION TOPICAL
Qty: 120 ML | Refills: 11 | Status: SHIPPED | OUTPATIENT
Start: 2022-12-13 | End: 2023-01-23 | Stop reason: ALTCHOICE

## 2022-12-13 NOTE — PROGRESS NOTES
Patient Information  Name: Kilo Dailey  : 1960  MRN: [id number]     Referring Physician:  Dr. Richter ref. provider found   Primary Care Physician:  Dr. Kitty Ball MD   Date of Visit: 2022      Subjective:       Kilo Dailey is a 62 y.o. male who presents for   Chief Complaint   Patient presents with    Seborrheic Dermatitis     Follow up medication refills     HPI  Patient with hx of seb derm and rosacea, here for follow up. Last seen Dr. Davila in . Overall conditions are stable. Just needs refills. +eye dryness.    Patient with new complaint of lesion(s)  Location: back  Duration: years  Symptoms: none  Relieving factors/Previous treatments: none    Patient was last seen:Visit date not found     Prior notes by myself reviewed.   Clinical documentation obtained by nursing staff reviewed.    Review of Systems   Skin:  Negative for itching and rash.      Objective:    Physical Exam   Constitutional: He appears well-developed and well-nourished. No distress.   Neurological: He is alert and oriented to person, place, and time. He is not disoriented.   Psychiatric: He has a normal mood and affect.   Skin:   Areas Examined (abnormalities noted in diagram):   Head / Face Inspection Performed  Neck Inspection Performed  Back Inspection Performed                 Diagram Legend     Erythematous scaling macule/papule c/w actinic keratosis       Vascular papule c/w angioma      Pigmented verrucoid papule/plaque c/w seborrheic keratosis      Yellow umbilicated papule c/w sebaceous hyperplasia      Irregularly shaped tan macule c/w lentigo     1-2 mm smooth white papules consistent with Milia      Movable subcutaneous cyst with punctum c/w epidermal inclusion cyst      Subcutaneous movable cyst c/w pilar cyst      Firm pink to brown papule c/w dermatofibroma      Pedunculated fleshy papule(s) c/w skin tag(s)      Evenly pigmented macule c/w junctional nevus     Mildly variegated pigmented,  slightly irregular-bordered macule c/w mildly atypical nevus      Flesh colored to evenly pigmented papule c/w intradermal nevus       Pink pearly papule/plaque c/w basal cell carcinoma      Erythematous hyperkeratotic cursted plaque c/w SCC      Surgical scar with no sign of skin cancer recurrence      Open and closed comedones      Inflammatory papules and pustules      Verrucoid papule consistent consistent with wart     Erythematous eczematous patches and plaques     Dystrophic onycholytic nail with subungual debris c/w onychomycosis     Umbilicated papule    Erythematous-base heme-crusted tan verrucoid plaque consistent with inflamed seborrheic keratosis     Erythematous Silvery Scaling Plaque c/w Psoriasis     See annotation      No images are attached to the encounter or orders placed in the encounter.    [] Data reviewed  [] Independent review of test  [] Management discussed with another provider    Assessment / Plan:        Rosacea  -     erythromycin with ethanoL (EMGEL) 2 % gel; AAA of face bid.  For rosacea/redness.  Dispense: 60 g; Refill: 11  -     doxycycline (VIBRA-TABS) 100 MG tablet; Take 1 po qday with food and not within 1 hour prior to lying down  Dispense: 30 tablet; Refill: 2  -     metronidazole 1% (METROGEL) 1 % Gel; Apply topically once daily. Use as spot treatment on red bumps on face.  Dispense: 60 g; Refill: 11  Discussed benefits and risks of doxycyline therapy including but not limited to GI discomfort, esophageal irritation/ulceration, and increased sun sensitivity. Patient was counseled to take medicine with meals and at least 1 hour before lying down.     Seborrheic dermatitis  -     clobetasoL (TEMOVATE) 0.05 % external solution; Apply to affected areas of scalp 1-2 times daily as needed for itch  Dispense: 50 mL; Refill: 11  -     ketoconazole (NIZORAL) 2 % shampoo; Apply to scalp & effected dampened skin areas, lather and leave on x 5 min, then rinse off.  Use daily x 3 days.   Dispense: 120 mL; Refill: 11  Counseling on topical steroids:  Patient counseled that the prolonged use of topical steroids can result in the increased appearance superficial blood vessels (telangiectasias) lightening (hypopigmentation), and   thinning of the skin ( atrophy).  Patient understands to avoid using high potency steroids in skin folds, the groin or the face.  The patient verbalized understanding of proper use and possible adverse effects of topical steroids.  All patient's questions and concerns were addressed.    Seborrheic keratoses  These are benign inherited growths without a malignant potential. Reassurance given to patient. No treatment is necessary.              LOS NUMBER AND COMPLEXITY OF PROBLEMS    COMPLEXITY OF DATA RISK TOTAL TIME (m)   71983  08202 [] 1 self-limited or minor problem [x] Minimal to none [] No treatment recommended or patient to monitor 15-29  10-19   20593  97883 Low  [] 2 or > self limited or minor problems  [] 1 stable chronic illness  [] 1 acute, uncomplicated illness or injury Limited (2)  [] Prior external notes from each unique source  [] Review result of each unique test  [] Order each unique test []  Low  OTC medications, minor skin biopsy 30-44  20-29   91275  21487 Moderate  []  1 or > chronic illness with progression, exacerbation or SE of treatment  [x]  2 or more stable chronic illnesses  []  1 acute illness with systemic symptoms  []  1 acute complicated injury  []  1 undiagnosed new problem with uncertain prognosis Moderate (1/3 below)  []  3 or more data items        *Now includes assessment requiring independent historian  []  Independent interpretation of a test  []  Discuss management/test with another provider Moderate  [x]  Prescription drug mgmt  []  Minor surgery with risk discussed  []  Mgmt limited by social determinates 45-59  30-39   60254  74212 High  []  1 or more chronic illness with severe exacerbation, progression or SE of treatment  []  1  acute or chronic illness/injury that poses a threat to life or bodily function Extensive (2/3 below)  []  3 or more data items        *Now includes assessment requiring independent historian.  []  Independent interpretation of a test  []  Discuss management/test with another provider High  []  Major surgery with risk discussed  []  Drug therapy requiring intensive monitoring for toxicity  []  Hospitalization  []  Decision for DNR 60-74  40-54      No follow-ups on file.    Lillie Rader MD, FAAD  [facility]

## 2022-12-27 ENCOUNTER — PATIENT MESSAGE (OUTPATIENT)
Dept: OPHTHALMOLOGY | Facility: CLINIC | Age: 62
End: 2022-12-27
Payer: COMMERCIAL

## 2023-01-03 ENCOUNTER — TELEPHONE (OUTPATIENT)
Dept: OPHTHALMOLOGY | Facility: CLINIC | Age: 63
End: 2023-01-03
Payer: COMMERCIAL

## 2023-01-04 ENCOUNTER — OFFICE VISIT (OUTPATIENT)
Dept: OPHTHALMOLOGY | Facility: CLINIC | Age: 63
End: 2023-01-04
Payer: COMMERCIAL

## 2023-01-04 DIAGNOSIS — H04.123 DRY EYE SYNDROME, BILATERAL: ICD-10-CM

## 2023-01-04 DIAGNOSIS — Z96.1 PSEUDOPHAKIA OF BOTH EYES: ICD-10-CM

## 2023-01-04 DIAGNOSIS — H01.014 ULCERATIVE BLEPHARITIS OF UPPER EYELIDS OF BOTH EYES: Primary | ICD-10-CM

## 2023-01-04 DIAGNOSIS — H01.011 ULCERATIVE BLEPHARITIS OF UPPER EYELIDS OF BOTH EYES: Primary | ICD-10-CM

## 2023-01-04 DIAGNOSIS — H40.013 OPEN ANGLE WITH BORDERLINE FINDINGS OF BOTH EYES: ICD-10-CM

## 2023-01-04 DIAGNOSIS — Z98.890 S/P YAG CAPSULOTOMY, RIGHT: ICD-10-CM

## 2023-01-04 PROCEDURE — 99214 PR OFFICE/OUTPT VISIT, EST, LEVL IV, 30-39 MIN: ICD-10-PCS | Mod: S$GLB,,, | Performed by: OPHTHALMOLOGY

## 2023-01-04 PROCEDURE — 1159F PR MEDICATION LIST DOCUMENTED IN MEDICAL RECORD: ICD-10-PCS | Mod: CPTII,S$GLB,, | Performed by: OPHTHALMOLOGY

## 2023-01-04 PROCEDURE — 99999 PR PBB SHADOW E&M-EST. PATIENT-LVL III: ICD-10-PCS | Mod: PBBFAC,,, | Performed by: OPHTHALMOLOGY

## 2023-01-04 PROCEDURE — 1159F MED LIST DOCD IN RCRD: CPT | Mod: CPTII,S$GLB,, | Performed by: OPHTHALMOLOGY

## 2023-01-04 PROCEDURE — 1160F PR REVIEW ALL MEDS BY PRESCRIBER/CLIN PHARMACIST DOCUMENTED: ICD-10-PCS | Mod: CPTII,S$GLB,, | Performed by: OPHTHALMOLOGY

## 2023-01-04 PROCEDURE — 1160F RVW MEDS BY RX/DR IN RCRD: CPT | Mod: CPTII,S$GLB,, | Performed by: OPHTHALMOLOGY

## 2023-01-04 PROCEDURE — 99214 OFFICE O/P EST MOD 30 MIN: CPT | Mod: S$GLB,,, | Performed by: OPHTHALMOLOGY

## 2023-01-04 PROCEDURE — 99999 PR PBB SHADOW E&M-EST. PATIENT-LVL III: CPT | Mod: PBBFAC,,, | Performed by: OPHTHALMOLOGY

## 2023-01-04 NOTE — PROGRESS NOTES
HPI     Itchy Eye            Comments: Patient reports with irritation OU, going on for months. Using   warm water and baby shampoo to clean along lashes for 10 minutes, states   it provides relief for a few hours. Was given Doxycycline  mg by   dermatologist for rosacea on his forehead (states Dr Rader thought rosacea   could be causing irritation with eyes, states he has not seen much relief   with Doxy) reports of blurred vision, states he is constantly blinking   away. Using Refresh and OTC PRN OU.           Comments    1. PCIOL OS 12/6/2018   YAG OD 9/13/22  PCIOL OD +18.5 SN60WF / CDE:16.69 / 12/7/17 BLOCK    Coag susp- based on c/d asym.     GCL 30 OU --- 08/04/21        H/O WET AMD OD -DAD  H/O DRY AMD OS -DAD  H/O RP-SI    Refresh [place] prn  Fish oil            Last edited by Chidi Matthew MD on 1/4/2023  9:33 AM.            Assessment /Plan     For exam results, see Encounter Report.      ICD-10-CM ICD-9-CM    1. 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 Treatment options include periodic hot compresses, the use of antibiotic ointment at bedtime, the use of Steri-lid cleanser, and the use of artificial tears and lubricants. Explained that symptoms may wax and wane over time and the patient is instructed to increase the intensity of the treatment if the symptoms worsen. The patient may also require the occasional use of a steroid eye drop and is warned not to use them for greater that 3-4 weeks without being rechecked for a possible IOP spike. The patient is always welcome to call for an appointment if the symptoms appear to be getting out of control.    Current treatment chosen for this patient :Warm compresses, Thera Tears Sterilids, Systane Ultra tears and stop the moise's baby Shampoo   Pt can taper the Doxy pill to 1/2 pill daily by mouth     H01.014        2. Dry eye syndrome, bilateral   H04.123 375.15 Use Systane Ultra       3. S/P YAG capsulotomy, right  Z98.890 V45.89       4. Open angle with borderline findings of both eyes  H40.013 365.01 Follow IOP, last oct and dilated exam were stable       5. Pseudophakia of both eyes  Z96.1 V43.1 Doing well           RETURN TO CLINIC In September for Dilated Exam, GOCT

## 2023-01-19 ENCOUNTER — PATIENT MESSAGE (OUTPATIENT)
Dept: PRIMARY CARE CLINIC | Facility: CLINIC | Age: 63
End: 2023-01-19
Payer: COMMERCIAL

## 2023-01-23 ENCOUNTER — OFFICE VISIT (OUTPATIENT)
Dept: PRIMARY CARE CLINIC | Facility: CLINIC | Age: 63
End: 2023-01-23
Payer: COMMERCIAL

## 2023-01-23 DIAGNOSIS — R20.2 NUMBNESS AND TINGLING IN LEFT HAND: Primary | ICD-10-CM

## 2023-01-23 DIAGNOSIS — R20.0 NUMBNESS AND TINGLING IN LEFT HAND: Primary | ICD-10-CM

## 2023-01-23 DIAGNOSIS — G89.29 CHRONIC LOW BACK PAIN, UNSPECIFIED BACK PAIN LATERALITY, UNSPECIFIED WHETHER SCIATICA PRESENT: ICD-10-CM

## 2023-01-23 DIAGNOSIS — M54.50 CHRONIC LOW BACK PAIN, UNSPECIFIED BACK PAIN LATERALITY, UNSPECIFIED WHETHER SCIATICA PRESENT: ICD-10-CM

## 2023-01-23 PROCEDURE — 1160F PR REVIEW ALL MEDS BY PRESCRIBER/CLIN PHARMACIST DOCUMENTED: ICD-10-PCS | Mod: CPTII,95,, | Performed by: FAMILY MEDICINE

## 2023-01-23 PROCEDURE — 99214 PR OFFICE/OUTPT VISIT, EST, LEVL IV, 30-39 MIN: ICD-10-PCS | Mod: 95,,, | Performed by: FAMILY MEDICINE

## 2023-01-23 PROCEDURE — 1159F PR MEDICATION LIST DOCUMENTED IN MEDICAL RECORD: ICD-10-PCS | Mod: CPTII,95,, | Performed by: FAMILY MEDICINE

## 2023-01-23 PROCEDURE — 1159F MED LIST DOCD IN RCRD: CPT | Mod: CPTII,95,, | Performed by: FAMILY MEDICINE

## 2023-01-23 PROCEDURE — 99214 OFFICE O/P EST MOD 30 MIN: CPT | Mod: 95,,, | Performed by: FAMILY MEDICINE

## 2023-01-23 PROCEDURE — 1160F RVW MEDS BY RX/DR IN RCRD: CPT | Mod: CPTII,95,, | Performed by: FAMILY MEDICINE

## 2023-01-23 NOTE — PROGRESS NOTES
Subjective:      Patient ID: Kilo Dailey is a 62 y.o. male.    Chief Complaint: Tingling    The patient location is: home  Visit type: video and audio simultaneous    Patient is a 62 y.o. male coming in today  has a past medical history of Allergic rhinitis, BPH (benign prostatic hyperplasia), Colon polyps, Hyperlipidemia, Tennis elbow, and Tinea versicolor.    Pt presents via telemedicine c/o finger numbness.   Currently on Doxycycline and Atorvastatin. Pt reports he has been experiencing finger numbness in left hand 4th and 5th digits.  States there have been episodes in which he cannot move fingers at all. Denies any recent injury or trauma. Today he also reports mild back pain that is stable with stretching exercises at times.     [facility] Reason For Visit    1/21/2023  9:03 AM CST - Filed by Patient   What is your primary reason for visit? Other/Annual   Have you experienced any of the following:   Change in activity? No   Unexpected weight change? No   Neck pain? Yes   Hearing loss? Yes   Runny nose? No   Trouble swallowing? No   Eye discharge? Yes   Changes in vision? Yes   Chest tightness? No   Wheezing? No   Chest pain? No   Heart beating fast or racing? No   Blood in stool? No   Constipation? No   Vomiting? No   Diarrhea? No   Drinking much more than usual? No   Urinating much more than usual? No   Difficulty urinating? Yes   Have a sudden urge to urinate? No   Blood in the urine? No   Joint swelling? No   Joint pain? Yes   Headaches? No   Weakness? No   Confusion? No   Feeling depressed? No     [facility] Peq Documents    1/21/2023  9:03 AM CST - Filed by Patient   Would you like a copy of [facility]'s Financial Assistance Policy Summary? No, I would not like a copy.   Would you like to receive more information regarding your rights and protections under the [place] Billing act? No, I would not.     [facility]    1/21/2023  9:05 AM CST - Filed by Patient   On average, how many days per week do you  engage in moderate to strenuous exercise (like a brisk walk)? 3 days   On average, how many minutes do you engage in exercise at this level? 50 min   Do you feel stress - tense, restless, nervous, or anxious, or unable to sleep at night because your mind is troubled all the time - these days? Only a little   Do you belong to any clubs or organizations such as Yars[employer] groups, unions, fraternal or athletic groups, or school groups? No   How often do you attend meetings of the clubs or organizations you belong to?    In a typical week, how many times do you talk on the phone with family, friends, or neighbors? More than three times a week   How often do you get together with friends or relatives? Once a week   Are you , , , , never , or living with a partner?    How hard is it for you to pay for the very basics like food, housing, medical care, and heating? Not hard at all   Within the past 12 months, you worried that your food would run out before you got the money to buy more. Never true   Within the past 12 months, the food you bought just didnt last and you didnt have money to get more. Never true   In the past 12 months, has lack of transportation kept you from medical appointments or from getting medications? No   In the past 12 months, has lack of transportation kept you from meetings, work, or from getting things needed for daily living? No   How often do you have a drink containing alcohol? 2-4 times a month   How many drinks containing alcohol do you have on a typical day when you are drinking? 1 or 2   How often do you have six or more drinks on one occasion? Never   In the last 12 months, was there a time when you were not able to pay the mortgage or rent on time? No   In the last 12 months, how many places have you lived? (range: at least 0) 1   In the last 12 months, was there a time when you did not have a steady place to sleep or slept in a shelter  (including now)? No         Pmh, Psh, Family Hx, Social Hx updated in Epic Tabs today.     LABS:   Lab Results   Component Value Date    WBC 10.91 06/16/2022    HGB 15.1 06/16/2022    HCT 46.7 06/16/2022     06/16/2022    CHOL 179 06/16/2022    TRIG 69 06/16/2022    HDL 67 06/16/2022    ALT 15 06/16/2022    AST 18 06/16/2022     06/16/2022    K 5.0 06/16/2022     06/16/2022    CREATININE 0.9 06/16/2022    BUN 18 06/16/2022    CO2 26 06/16/2022    TSH 2.212 06/16/2022    PSA 1.8 06/16/2022    HGBA1C 5.3 06/16/2022       Posterior Segment OCT Optic Nerve- Both eyes  Right Eye  Quality was good. Scan locations included Retinal Nerve Fiber Layer   (RNFL).     Left Eye  Quality was good. Scan locations included Retinal Nerve Fiber Layer   (RNFL).     Findings  Right Eye  No. Progression has been stable.     Left Eye  Normal, Borderline. No. Progression has been stable.     Notes  GCL 29/30         Review of Systems   Constitutional:  Negative for activity change and unexpected weight change.   HENT:  Positive for hearing loss. Negative for rhinorrhea and trouble swallowing.    Eyes:  Positive for discharge and visual disturbance.   Respiratory:  Negative for chest tightness and wheezing.    Cardiovascular:  Negative for chest pain and palpitations.   Gastrointestinal:  Negative for blood in stool, constipation, diarrhea and vomiting.   Endocrine: Negative for polydipsia and polyuria.   Genitourinary:  Positive for difficulty urinating. Negative for hematuria and urgency.   Musculoskeletal:  Positive for arthralgias and neck pain. Negative for joint swelling.   Neurological:  Positive for numbness (fingers in left hand). Negative for weakness and headaches.   Psychiatric/Behavioral:  Negative for confusion and dysphoric mood.    Objective:   There were no vitals filed for this visit.  Wt Readings from Last 10 Encounters:   08/11/22 67.8 kg (149 lb 5.8 oz)   06/15/22 68.1 kg (150 lb 2.1 oz)   08/27/21 66  kg (145 lb 8.1 oz)   07/16/21 65.8 kg (145 lb 1 oz)   08/27/20 68.9 kg (152 lb 0.1 oz)   05/29/20 68.9 kg (152 lb)   03/31/20 69.4 kg (153 lb)   01/27/20 69.5 kg (153 lb 3.5 oz)   05/10/19 67.9 kg (149 lb 11.1 oz)   04/12/18 68.2 kg (150 lb 5.7 oz)     Physical Exam  Vitals reviewed.   Constitutional:       General: He is awake. He is not in acute distress.     Appearance: Normal appearance. He is well-developed, well-groomed and normal weight. He is not ill-appearing.   HENT:      Head: Normocephalic and atraumatic.      Right Ear: External ear normal.      Left Ear: External ear normal.      Nose: Nose normal.      Mouth/Throat:      Lips: Pink.   Eyes:      Conjunctiva/sclera: Conjunctivae normal.   Pulmonary:      Effort: Pulmonary effort is normal.   Musculoskeletal:      Right hand: Normal.      Left hand: No bony tenderness. Normal range of motion. Normal strength. Decreased sensation of the ulnar distribution.      Cervical back: Normal.      Thoracic back: Normal.      Lumbar back: Spasms present.   Neurological:      Mental Status: He is alert.   Psychiatric:         Attention and Perception: Attention and perception normal. He is attentive.         Mood and Affect: Mood and affect normal. Mood is not anxious or depressed. Affect is not labile, blunt, angry or inappropriate.         Speech: Speech normal. He is communicative. Speech is not rapid and pressured, delayed, slurred or tangential.         Behavior: Behavior normal. Behavior is not agitated, slowed, aggressive, withdrawn, hyperactive or combative. Behavior is cooperative.         Thought Content: Thought content normal. Thought content is not paranoid or delusional. Thought content does not include homicidal or suicidal ideation. Thought content does not include homicidal or suicidal plan.         Cognition and Memory: Cognition and memory normal. Memory is not impaired. He does not exhibit impaired recent memory or impaired remote memory.          Judgment: Judgment normal. Judgment is not impulsive or inappropriate.     Assessment:     1. Numbness and tingling in left hand    2. Chronic low back pain, unspecified back pain laterality, unspecified whether sciatica present      Plan:   Kilo was seen today for tingling.    Diagnoses and all orders for this visit:    Numbness and tingling in left hand  Comments:  new, ulnar distribution. emg referral. can try exercises, wrist splint, and activity modification. refer to ortho based on EMG results.   Orders:  -     EMG W/ ULTRASOUND AND NERVE CONDUCTION TEST 2 Extremities; Future    Chronic low back pain, unspecified back pain laterality, unspecified whether sciatica present  Comments:  exercises given.     - New Problem, discussed symptoms, work up, suspected diagnosis. Will place order for labs, and/or referral for treatment.   .  Once workup has been reviewed will make adjustments if needed.   Advised on using splint for numbness and tingling treatment.   EMG test ordered to assess for numbness and tingling.  Will refer to orthopedics of sx persist or worsen.   Instructed to f/u if sx persist or worsen.       Face to Face time with patient: 12:13 PM-12:21 PM           23 minutes of total time spent on the encounter, which includes face to face time and non-face to face time preparing to see the patient (eg, review of tests), Obtaining and/or reviewing separately obtained history, Documenting clinical information in the electronic or other health record, Independently interpreting results (not separately reported) and communicating results to the patient/family/caregiver, or Care coordination (not separately reported).     Each patient to whom he or she provides medical services by telemedicine is:  (1) informed of the relationship between the physician and patient and the respective role of any other health care provider with respect to management of the patient; and (2) notified that he or she may decline  to receive medical services by telemedicine and may withdraw from such care at any time.      Follow up if symptoms worsen or fail to improve.    There are no Patient Instructions on file for this visit.    Scribe Attestation:   I, Bhargav Arzate, am scribing for, and in the presence of, Dr. Kitty Ball MD. I performed the above scribed service and the documentation accurately describes the services I performed. I attest to the accuracy of the note.    I, Dr. Kitty Ball MD, reviewed documentation as scribed above. I personally performed the services described in this documentation.  I agree that the record reflects my personal performance and is accurate and complete. Kitty Ball MD.    01/23/2023

## 2023-01-31 ENCOUNTER — PATIENT MESSAGE (OUTPATIENT)
Dept: OPHTHALMOLOGY | Facility: CLINIC | Age: 63
End: 2023-01-31
Payer: COMMERCIAL

## 2023-01-31 ENCOUNTER — PATIENT MESSAGE (OUTPATIENT)
Dept: DERMATOLOGY | Facility: CLINIC | Age: 63
End: 2023-01-31
Payer: COMMERCIAL

## 2023-01-31 ENCOUNTER — PATIENT MESSAGE (OUTPATIENT)
Dept: PRIMARY CARE CLINIC | Facility: CLINIC | Age: 63
End: 2023-01-31
Payer: COMMERCIAL

## 2023-02-01 RX ORDER — ERYTHROMYCIN 5 MG/G
OINTMENT OPHTHALMIC
Qty: 3.5 G | Refills: 3 | Status: SHIPPED | OUTPATIENT
Start: 2023-02-01 | End: 2023-06-27

## 2023-02-03 DIAGNOSIS — L71.9 ROSACEA: Primary | ICD-10-CM

## 2023-02-03 RX ORDER — CLINDAMYCIN PHOSPHATE 11.9 MG/ML
SOLUTION TOPICAL 2 TIMES DAILY
Qty: 60 ML | Refills: 5 | Status: SHIPPED | OUTPATIENT
Start: 2023-02-03

## 2023-02-13 ENCOUNTER — PATIENT MESSAGE (OUTPATIENT)
Dept: PRIMARY CARE CLINIC | Facility: CLINIC | Age: 63
End: 2023-02-13
Payer: COMMERCIAL

## 2023-02-13 ENCOUNTER — TELEPHONE (OUTPATIENT)
Dept: PRIMARY CARE CLINIC | Facility: CLINIC | Age: 63
End: 2023-02-13
Payer: COMMERCIAL

## 2023-02-13 NOTE — TELEPHONE ENCOUNTER
----- Message from Lottie Fry sent at 2/13/2023 10:26 AM CST -----  Contact: Kilo  Patient is calling to speak with nurse regarding orders. Reports provider told him to get an EMG, and is needing the orders placed in his chart to get scheduled. Please give patient a call back at .[phone].

## 2023-02-14 ENCOUNTER — TELEPHONE (OUTPATIENT)
Dept: PHYSICAL MEDICINE AND REHAB | Facility: CLINIC | Age: 63
End: 2023-02-14
Payer: COMMERCIAL

## 2023-02-14 NOTE — TELEPHONE ENCOUNTER
----- Message from Ashia Fry MA sent at 2/13/2023  4:44 PM CST -----  Contact: Self    ----- Message -----  From: Rogers Aguilar  Sent: 2/13/2023   4:29 PM CST  To: [facility] Staff    MR. Dailey is asking for an return call in reference to scheduling an EMG,please call back at .[phone] Thx CJ

## 2023-03-08 ENCOUNTER — OFFICE VISIT (OUTPATIENT)
Dept: PHYSICAL MEDICINE AND REHAB | Facility: CLINIC | Age: 63
End: 2023-03-08
Payer: COMMERCIAL

## 2023-03-08 VITALS
HEART RATE: 91 BPM | DIASTOLIC BLOOD PRESSURE: 78 MMHG | RESPIRATION RATE: 13 BRPM | WEIGHT: 149 LBS | BODY MASS INDEX: 20.18 KG/M2 | SYSTOLIC BLOOD PRESSURE: 125 MMHG | HEIGHT: 72 IN

## 2023-03-08 DIAGNOSIS — G56.22 CUBITAL TUNNEL SYNDROME ON LEFT: Primary | ICD-10-CM

## 2023-03-08 DIAGNOSIS — G56.03 BILATERAL CARPAL TUNNEL SYNDROME: ICD-10-CM

## 2023-03-08 PROCEDURE — 99999 PR PBB SHADOW E&M-EST. PATIENT-LVL III: CPT | Mod: PBBFAC,,, | Performed by: PHYSICAL MEDICINE & REHABILITATION

## 2023-03-08 PROCEDURE — 1159F PR MEDICATION LIST DOCUMENTED IN MEDICAL RECORD: ICD-10-PCS | Mod: CPTII,S$GLB,, | Performed by: PHYSICAL MEDICINE & REHABILITATION

## 2023-03-08 PROCEDURE — 95912 NRV CNDJ TEST 11-12 STUDIES: CPT | Mod: S$GLB,,, | Performed by: PHYSICAL MEDICINE & REHABILITATION

## 2023-03-08 PROCEDURE — 95912 PR NERVE CONDUCTION STUDY; 11 -12 STUDIES: ICD-10-PCS | Mod: S$GLB,,, | Performed by: PHYSICAL MEDICINE & REHABILITATION

## 2023-03-08 PROCEDURE — 1160F RVW MEDS BY RX/DR IN RCRD: CPT | Mod: CPTII,S$GLB,, | Performed by: PHYSICAL MEDICINE & REHABILITATION

## 2023-03-08 PROCEDURE — 3008F PR BODY MASS INDEX (BMI) DOCUMENTED: ICD-10-PCS | Mod: CPTII,S$GLB,, | Performed by: PHYSICAL MEDICINE & REHABILITATION

## 2023-03-08 PROCEDURE — 99999 PR PBB SHADOW E&M-EST. PATIENT-LVL III: ICD-10-PCS | Mod: PBBFAC,,, | Performed by: PHYSICAL MEDICINE & REHABILITATION

## 2023-03-08 PROCEDURE — 99499 NO LOS: ICD-10-PCS | Mod: S$GLB,,, | Performed by: PHYSICAL MEDICINE & REHABILITATION

## 2023-03-08 PROCEDURE — 3074F SYST BP LT 130 MM HG: CPT | Mod: CPTII,S$GLB,, | Performed by: PHYSICAL MEDICINE & REHABILITATION

## 2023-03-08 PROCEDURE — 99499 UNLISTED E&M SERVICE: CPT | Mod: S$GLB,,, | Performed by: PHYSICAL MEDICINE & REHABILITATION

## 2023-03-08 PROCEDURE — 1159F MED LIST DOCD IN RCRD: CPT | Mod: CPTII,S$GLB,, | Performed by: PHYSICAL MEDICINE & REHABILITATION

## 2023-03-08 PROCEDURE — 3078F DIAST BP <80 MM HG: CPT | Mod: CPTII,S$GLB,, | Performed by: PHYSICAL MEDICINE & REHABILITATION

## 2023-03-08 PROCEDURE — 3078F PR MOST RECENT DIASTOLIC BLOOD PRESSURE < 80 MM HG: ICD-10-PCS | Mod: CPTII,S$GLB,, | Performed by: PHYSICAL MEDICINE & REHABILITATION

## 2023-03-08 PROCEDURE — 1160F PR REVIEW ALL MEDS BY PRESCRIBER/CLIN PHARMACIST DOCUMENTED: ICD-10-PCS | Mod: CPTII,S$GLB,, | Performed by: PHYSICAL MEDICINE & REHABILITATION

## 2023-03-08 PROCEDURE — 3008F BODY MASS INDEX DOCD: CPT | Mod: CPTII,S$GLB,, | Performed by: PHYSICAL MEDICINE & REHABILITATION

## 2023-03-08 PROCEDURE — 3074F PR MOST RECENT SYSTOLIC BLOOD PRESSURE < 130 MM HG: ICD-10-PCS | Mod: CPTII,S$GLB,, | Performed by: PHYSICAL MEDICINE & REHABILITATION

## 2023-03-08 RX ORDER — CLOBETASOL PROPIONATE 0.46 MG/ML
SOLUTION TOPICAL
COMMUNITY
Start: 2023-01-24

## 2023-03-08 NOTE — PROGRESS NOTES
[facility]   [address] [facility]  [place] [address]  Phone: [phone]        Full Name: Kilo Dailey YOB: 1960  Patient ID: [id number]      Visit Date: 3/8/2023 09:10  Age: 62 Years  Examining Physician: Dr Telles  Referring Physician: Dr Ball  Conclusion: upper ext  Chief Complaint   Patient presents with    Hand Pain     Left hand numbness       HPI: This is a 62 y.o.  male being seen in clinic today for evaluation of chronic left hand numbness/tingling into his 4th and 5th digit.  His symptoms are worse with increased hand/arm usage or at night when sleeping with his elbow in flexion. On a few occasions he experienced cramping and limited ROM in his fingers.  He has noticed right 2nd and 3rd digit numbness with driving at times.  Position change and rest provide some relief.    History obtained from patient    Past family, medical, social, and surgical history reviewed in chart    Review of Systems:     General- denies lethargy, weight change, fever, chills  Head/neck- denies swallowing difficulties  ENT- denies hearing changes  Cardiovascular-denies chest pain  Pulmonary- denies shortness of breath  GI- denies constipation or bowel incontinence  - denies bladder incontinence  Skin- denies wounds or rashes  Musculoskeletal- denies weakness, + pain  Neurologic- + numbness and tingling  Psychiatric- denies depressive or psychotic features, denies anxiety  Lymphatic-denies swelling  Endocrine- denies hypoglycemic symptoms/DM history  All other pertinent systems negative     Physical Examination:  General: Well developed, well nourished male, NAD  HEENT:NCAT EOMI bilaterally   Pulmonary:Normal respirations    Spinal Examination: CERVICAL  Active ROM is within normal limits.  Inspection: No deformity of spinal alignment.  Palpation: No vertebral tenderness to percussion.      Musculoskeletal Tests:  Phalen: neg  Elbow compression (ulnar): + on left  Tinels at wrist: neg    Bilateral  Upper and Lower Extremities:  Pulses are 2+ at radial bilaterally.  Shoulder/Elbow/Wrist/Hand ROM wnl  Hip/Knee/Ankle ROM   Bilateral Extremities show normal capillary refill.  No signs of cyanosis, rubor, edema, skin changes, or dysvascular changes of appendages.  Nails appear intact.    Neurological Exam:  Cranial Nerves:  II-XII grossly intact    Manual Muscle Testing: (Motor 5=normal)  5/5 strength bilateral upper extremities    No focal atrophy is noted of either upper extremity.    Bilateral Reflexes:  Mcmillan's response is absent bilaterally.    Sensation: tested to light touch  - intact in arms except dec at left 5th digit    Gait: Narrow base and good arm swing.      Entire procedure explained to patient prior to proceeding.  Verbal consent obtained    Sensory NCS      Nerve / Sites Rec. Site Onset Lat Peak Lat NP Amp PP Amp Segments Distance Velocity     ms ms µV µV  mm m/s   L Median - Digit II (Antidromic)      Wrist Dig II 2.90 3.65 13.4 16.4 Wrist - Dig  48   R Median - Digit II (Antidromic)      Wrist Dig II 3.00 3.92 19.5 21.2 Wrist - Dig  47   L Ulnar - Digit V (Antidromic)      Wrist Dig V 3.02 3.83 11.6 18.2 Wrist - Dig V 140 46   R Ulnar - Digit V (Antidromic)      Wrist Dig V 2.56 3.48 26.2 23.2 Wrist - Dig V 140 55   L Radial - Anatomical snuff box (Forearm)      Forearm Wrist 1.98 2.58 14.2 18.5 Forearm - Wrist 100 51   R Radial - Anatomical snuff box (Forearm)      Forearm Wrist 1.96 2.58 13.1 11.1 Forearm - Wrist 100 51                   Combined Sensory Index      Nerve / Sites Rec. Site Peak Lat NP Amp PP Amp Segments Peak Diff     ms µV µV  ms   L Median - CSI      Median Thumb 3.04 6.2 5.2 Median - Radial 0.52      Radial Thumb 2.52 8.5 18.0 Median - Ulnar 0.21      Median Ring 3.77 7.4 10.8 Median palm - Ulnar palm 0.35      Ulnar Ring 3.56 11.9 18.6        Median palm Wrist 2.21 6.0 8.2        Ulnar palm Wrist 1.85 10.7 12.5        CSI     CSI 1.08   R Median - CSI       Median Thumb 3.17 10.1 8.2 Median - Radial 0.33      Radial Thumb 2.83 6.0 10.8 Median - Ulnar 0.10      Median Ring 3.83 3.1 29.5 Median palm - Ulnar palm 0.58      Ulnar Ring 3.73 7.6 23.1        Median palm Wrist 2.40 23.7 34.0        Ulnar palm Wrist 1.81 37.4 13.5        CSI     CSI 1.02           Motor NCS      Nerve / Sites Muscle Latency Amplitude Amp % Duration Segments Distance Lat Diff Velocity     ms mV % ms  mm ms m/s   L Median - APB      Wrist APB 3.40 11.1 100 6.54 Wrist - APB 80        Elbow APB 7.98 10.5 94.9 6.75 Elbow - Wrist 260 4.58 57   R Median - APB      Wrist APB 3.77 8.8 100 7.83 Wrist - APB 80        Elbow APB 8.42 7.7 87.9 8.17 Elbow - Wrist 230 4.65 50   L Ulnar - ADM      Wrist ADM 3.06 13.3 100 6.40 Wrist - ADM 80        B.Elbow ADM 7.23 12.6 95.1 7.13 B.Elbow - Wrist 250 4.17 60      A.Elbow ADM 9.83 10.3 77.8 6.65 A.Elbow - B.Elbow 120 2.60 46   R Ulnar - ADM      Wrist ADM 3.13 10.3 100 7.56 Wrist - ADM 80        B.Elbow ADM 7.52 10.3 99.4 6.75 B.Elbow - Wrist 250 4.40 57      A.Elbow ADM 9.77 9.8 95.1 7.35 A.Elbow - B.Elbow 120 2.25 53                     INTERPRETATION  -Bilateral median motor nerve conduction study showed normal latency, amplitude, and conduction velocity  -Bilateral median sensory nerve conduction study showed normal peak latency and amplitude  -Bilateral ulnar motor nerve conduction study showed normal latency, amplitude, and dec conduction velocity across the left elbow  -Bilateral ulnar sensory nerve conduction study showed normal peak latency and amplitude  -Bilateral radial sensory nerve conduction study showed normal peak latency and amplitude  -Bilateral combined sensory index was significant    IMPRESSION  ABNORMAL study  2. 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 Follow up with referring provider: Dr. Kitty Ball  2. Rec referral to OT. Consider ortho if failed conservative measures. Rec rest breaks when working and consider wrist braces and elbow ext splint at night.  Handouts on CTS and cubital tunnel provided  3. This study is good for one year. If symptoms worsen or do not improve, please re-consult.    Becca Telles M.D.  Physical Medicine and Rehab

## 2023-03-22 ENCOUNTER — PATIENT MESSAGE (OUTPATIENT)
Dept: PHYSICAL MEDICINE AND REHAB | Facility: CLINIC | Age: 63
End: 2023-03-22
Payer: COMMERCIAL

## 2023-03-22 ENCOUNTER — PATIENT MESSAGE (OUTPATIENT)
Dept: PRIMARY CARE CLINIC | Facility: CLINIC | Age: 63
End: 2023-03-22
Payer: COMMERCIAL

## 2023-03-22 DIAGNOSIS — G56.00 CARPAL TUNNEL SYNDROME, UNSPECIFIED LATERALITY: Primary | ICD-10-CM

## 2023-03-22 DIAGNOSIS — G56.20 CUBITAL TUNNEL SYNDROME, UNSPECIFIED LATERALITY: ICD-10-CM

## 2023-03-22 NOTE — TELEPHONE ENCOUNTER
I have signed for the following orders AND/OR meds.  Please call the patient and ask the patient to schedule the testing AND/OR inform about any medications that were sent.      Orders Placed This Encounter   Procedures    Ambulatory referral/consult to Physical/Occupational Therapy     Standing Status:   Future     Standing Expiration Date:   4/22/2024     Referral Priority:   Routine     Referral Type:   Physical Medicine     Referral Reason:   Specialty Services Required     Requested Specialty:   Occupational Therapy     Number of Visits Requested:   1

## 2023-03-23 ENCOUNTER — PATIENT MESSAGE (OUTPATIENT)
Dept: PRIMARY CARE CLINIC | Facility: CLINIC | Age: 63
End: 2023-03-23
Payer: COMMERCIAL

## 2023-03-25 ENCOUNTER — PATIENT MESSAGE (OUTPATIENT)
Dept: PHYSICAL MEDICINE AND REHAB | Facility: CLINIC | Age: 63
End: 2023-03-25
Payer: COMMERCIAL

## 2023-03-27 ENCOUNTER — PATIENT MESSAGE (OUTPATIENT)
Dept: PRIMARY CARE CLINIC | Facility: CLINIC | Age: 63
End: 2023-03-27
Payer: COMMERCIAL

## 2023-03-27 DIAGNOSIS — G56.00 CARPAL TUNNEL SYNDROME, UNSPECIFIED LATERALITY: Primary | ICD-10-CM

## 2023-03-28 ENCOUNTER — PATIENT MESSAGE (OUTPATIENT)
Dept: PRIMARY CARE CLINIC | Facility: CLINIC | Age: 63
End: 2023-03-28
Payer: COMMERCIAL

## 2023-03-29 ENCOUNTER — OFFICE VISIT (OUTPATIENT)
Dept: PRIMARY CARE CLINIC | Facility: CLINIC | Age: 63
End: 2023-03-29
Payer: COMMERCIAL

## 2023-03-29 VITALS — BODY MASS INDEX: 20.59 KG/M2 | WEIGHT: 152 LBS | HEIGHT: 72 IN

## 2023-03-29 DIAGNOSIS — G62.9 NEUROPATHY: Primary | ICD-10-CM

## 2023-03-29 PROCEDURE — 3008F PR BODY MASS INDEX (BMI) DOCUMENTED: ICD-10-PCS | Mod: CPTII,95,, | Performed by: PHYSICIAN ASSISTANT

## 2023-03-29 PROCEDURE — 1159F PR MEDICATION LIST DOCUMENTED IN MEDICAL RECORD: ICD-10-PCS | Mod: CPTII,95,, | Performed by: PHYSICIAN ASSISTANT

## 2023-03-29 PROCEDURE — 3008F BODY MASS INDEX DOCD: CPT | Mod: CPTII,95,, | Performed by: PHYSICIAN ASSISTANT

## 2023-03-29 PROCEDURE — 1159F MED LIST DOCD IN RCRD: CPT | Mod: CPTII,95,, | Performed by: PHYSICIAN ASSISTANT

## 2023-03-29 PROCEDURE — 99213 OFFICE O/P EST LOW 20 MIN: CPT | Mod: 95,,, | Performed by: PHYSICIAN ASSISTANT

## 2023-03-29 PROCEDURE — 99213 PR OFFICE/OUTPT VISIT, EST, LEVL III, 20-29 MIN: ICD-10-PCS | Mod: 95,,, | Performed by: PHYSICIAN ASSISTANT

## 2023-03-29 NOTE — PROGRESS NOTES
Subjective     Patient ID: Kilo Dailey is a 62 y.o. male.    Chief Complaint: Numbness (C/O tingling and numbness in left foot x 1 month. Asking for referral )    The patient location is:  home   The chief complaint leading to consultation is:  referral     Visit type: audiovisual    Face to Face time with patient: 10 mins   12 minutes of total time spent on the encounter, which includes face to face time and non-face to face time preparing to see the patient (eg, review of tests), Obtaining and/or reviewing separately obtained history, Documenting clinical information in the electronic or other health record, Independently interpreting results (not separately reported) and communicating results to the patient/family/caregiver, or Care coordination (not separately reported).         Each patient to whom he or she provides medical services by telemedicine is:  (1) informed of the relationship between the physician and patient and the respective role of any other health care provider with respect to management of the patient; and (2) notified that he or she may decline to receive medical services by telemedicine and may withdraw from such care at any time.    Notes:    Patient calls in today for left foot issue    For about a month has intermittent numbness/ tingling on  left foot. Resolves  No mitigating or exacerbating factors   No back issues   Denies skin color changes or swelling     Not present with activity, when it comes, it comes with rest     Review of Systems   Constitutional:  Negative for activity change and unexpected weight change.   HENT:  Negative for hearing loss, rhinorrhea and trouble swallowing.    Eyes:  Negative for discharge and visual disturbance.   Respiratory:  Negative for chest tightness and wheezing.    Cardiovascular:  Negative for chest pain and palpitations.   Gastrointestinal:  Negative for blood in stool, constipation, diarrhea and vomiting.   Endocrine: Negative for  polydipsia and polyuria.   Genitourinary:  Negative for difficulty urinating, hematuria and urgency.   Musculoskeletal:  Negative for arthralgias, joint swelling and neck pain.   Neurological:  Negative for weakness and headaches.   Psychiatric/Behavioral:  Negative for confusion and dysphoric mood.         Objective     Physical Exam  Constitutional:       Appearance: Normal appearance.   HENT:      Head: Normocephalic and atraumatic.   Pulmonary:      Effort: Pulmonary effort is normal.   Neurological:      General: No focal deficit present.      Mental Status: He is alert and oriented to person, place, and time.   Psychiatric:         Mood and Affect: Mood normal.         Behavior: Behavior normal.         Thought Content: Thought content normal.          Assessment and Plan     Problem List Items Addressed This Visit    None  Visit Diagnoses       Neuropathy    -  Primary    Relevant Orders    EMG W/ ULTRASOUND AND NERVE CONDUCTION TEST 1 Extremity            Neuropathy  -     EMG W/ ULTRASOUND AND NERVE CONDUCTION TEST 1 Extremity; Future         Unsure of etiology of symptoms   He did message physiatry, suggested EMG   Referral in

## 2023-03-30 ENCOUNTER — OFFICE VISIT (OUTPATIENT)
Dept: PHYSICAL MEDICINE AND REHAB | Facility: CLINIC | Age: 63
End: 2023-03-30
Payer: COMMERCIAL

## 2023-03-30 VITALS
DIASTOLIC BLOOD PRESSURE: 70 MMHG | HEIGHT: 72 IN | HEART RATE: 73 BPM | WEIGHT: 152 LBS | SYSTOLIC BLOOD PRESSURE: 127 MMHG | RESPIRATION RATE: 12 BRPM | BODY MASS INDEX: 20.59 KG/M2

## 2023-03-30 DIAGNOSIS — M54.16 LUMBAR RADICULOPATHY: ICD-10-CM

## 2023-03-30 PROCEDURE — 95909 NRV CNDJ TST 5-6 STUDIES: CPT | Mod: S$GLB,,, | Performed by: PHYSICAL MEDICINE & REHABILITATION

## 2023-03-30 PROCEDURE — 99999 PR PBB SHADOW E&M-EST. PATIENT-LVL III: CPT | Mod: PBBFAC,,, | Performed by: PHYSICAL MEDICINE & REHABILITATION

## 2023-03-30 PROCEDURE — 95885 MUSC TST DONE W/NERV TST LIM: CPT | Mod: S$GLB,,, | Performed by: PHYSICAL MEDICINE & REHABILITATION

## 2023-03-30 PROCEDURE — 1159F MED LIST DOCD IN RCRD: CPT | Mod: CPTII,S$GLB,, | Performed by: PHYSICAL MEDICINE & REHABILITATION

## 2023-03-30 PROCEDURE — 3078F PR MOST RECENT DIASTOLIC BLOOD PRESSURE < 80 MM HG: ICD-10-PCS | Mod: CPTII,S$GLB,, | Performed by: PHYSICAL MEDICINE & REHABILITATION

## 2023-03-30 PROCEDURE — 99499 NO LOS: ICD-10-PCS | Mod: S$GLB,,, | Performed by: PHYSICAL MEDICINE & REHABILITATION

## 2023-03-30 PROCEDURE — 3074F PR MOST RECENT SYSTOLIC BLOOD PRESSURE < 130 MM HG: ICD-10-PCS | Mod: CPTII,S$GLB,, | Performed by: PHYSICAL MEDICINE & REHABILITATION

## 2023-03-30 PROCEDURE — 3008F PR BODY MASS INDEX (BMI) DOCUMENTED: ICD-10-PCS | Mod: CPTII,S$GLB,, | Performed by: PHYSICAL MEDICINE & REHABILITATION

## 2023-03-30 PROCEDURE — 95909 PR NERVE CONDUCTION STUDY; 5-6 STUDIES: ICD-10-PCS | Mod: S$GLB,,, | Performed by: PHYSICAL MEDICINE & REHABILITATION

## 2023-03-30 PROCEDURE — 99499 UNLISTED E&M SERVICE: CPT | Mod: S$GLB,,, | Performed by: PHYSICAL MEDICINE & REHABILITATION

## 2023-03-30 PROCEDURE — 3074F SYST BP LT 130 MM HG: CPT | Mod: CPTII,S$GLB,, | Performed by: PHYSICAL MEDICINE & REHABILITATION

## 2023-03-30 PROCEDURE — 1160F RVW MEDS BY RX/DR IN RCRD: CPT | Mod: CPTII,S$GLB,, | Performed by: PHYSICAL MEDICINE & REHABILITATION

## 2023-03-30 PROCEDURE — 95885 PR MUSC TST DONE W/NERV TST LIM: ICD-10-PCS | Mod: S$GLB,,, | Performed by: PHYSICAL MEDICINE & REHABILITATION

## 2023-03-30 PROCEDURE — 3008F BODY MASS INDEX DOCD: CPT | Mod: CPTII,S$GLB,, | Performed by: PHYSICAL MEDICINE & REHABILITATION

## 2023-03-30 PROCEDURE — 1160F PR REVIEW ALL MEDS BY PRESCRIBER/CLIN PHARMACIST DOCUMENTED: ICD-10-PCS | Mod: CPTII,S$GLB,, | Performed by: PHYSICAL MEDICINE & REHABILITATION

## 2023-03-30 PROCEDURE — 3078F DIAST BP <80 MM HG: CPT | Mod: CPTII,S$GLB,, | Performed by: PHYSICAL MEDICINE & REHABILITATION

## 2023-03-30 PROCEDURE — 99999 PR PBB SHADOW E&M-EST. PATIENT-LVL III: ICD-10-PCS | Mod: PBBFAC,,, | Performed by: PHYSICAL MEDICINE & REHABILITATION

## 2023-03-30 PROCEDURE — 1159F PR MEDICATION LIST DOCUMENTED IN MEDICAL RECORD: ICD-10-PCS | Mod: CPTII,S$GLB,, | Performed by: PHYSICAL MEDICINE & REHABILITATION

## 2023-03-30 NOTE — PROGRESS NOTES
[facility]   [address] [facility]  [place] [address]  Phone: [phone]            Full Name: Kilo Dailey YOB: 1960  Patient ID: [id number]      Visit Date: 3/30/2023 13:05  Age: 62 Years  Examining Physician:   Referring Physician: ANNA Bruno  Conclusion: leg pain  Chief Complaint   Patient presents with    Leg Pain       HPI: This is a 62 y.o.  male being seen in clinic today for evaluation of left foot numbness/tingling that has bothered him over the past month. When sitting/resting he notices his symptoms more.  He does have a history of on/off achy low back pain.      History obtained from patient    Past family, medical, social, and surgical history reviewed in chart    Review of Systems:     General- denies lethargy, weight change, fever, chills  Head/neck- denies swallowing difficulties  ENT- denies hearing changes  Cardiovascular-denies chest pain  Pulmonary- denies shortness of breath  GI- denies constipation or bowel incontinence  - denies bladder incontinence  Skin- denies wounds or rashes  Musculoskeletal- denies weakness, + pain  Neurologic- + numbness and tingling  Psychiatric- denies depressive or psychotic features, denies anxiety  Lymphatic-denies swelling  Endocrine- denies hypoglycemic symptoms/DM history  All other pertinent systems negative     Physical Examination:  General: Well developed, well nourished male, NAD  HEENT:NCAT EOMI bilaterally   Pulmonary:Normal respirations    Spinal Examination: LUMBAR  Active ROM is within normal limits.  Inspection: No deformity of spinal alignment.  Palpation: No vertebral tenderness to percussion.    Slr neg    Bilateral Upper and Lower Extremities:  Pulses are 2+ at radial bilaterally.  Shoulder/Elbow/Wrist/Hand ROM wnl  Hip/Knee/Ankle ROM wnl  Bilateral Extremities show normal capillary refill.  No signs of cyanosis, rubor, edema, skin changes, or dysvascular changes of appendages.  Nails appear  intact.    Neurological Exam:  Cranial Nerves:  II-XII grossly intact    Manual Muscle Testing: (Motor 5=normal)  5/5 strength bilateral lower extremities    No focal atrophy is noted of either lower extremity.    Bilateral Reflexes:  Mcmillan's response is absent bilaterally.    Sensation: tested to light touch  - intact in legs, mild dec at plantar surface of left foot  Gait: Narrow base and good arm swing.      Entire procedure explained to patient prior to proceeding.  Verbal consent obtained        Sensory NCS      Nerve / Sites Rec. Site Onset Lat Peak Lat NP Amp PP Amp Segments Distance Peak Diff Velocity     ms ms µV µV  mm ms m/s   L Sural - Ankle (Calf)      Calf Ankle 2.73 3.29 10.2 16.7 Calf - Ankle 140  51   L Superficial peroneal - Ankle      Lat leg Ankle 2.04 2.85 13.7 11.1 Lat leg - Ankle 140  69   L Medial plantar, Lateral plantar - Ankle (Medial, lateral sole)      Medial plantar Sole Ankle 2.46 3.42 10.6 11.3 Medial plantar Sole - Ankle 140  57      Lateral plantar Sole Ankle 2.38 3.33 12.9 13.8 Lateral plantar Sole - Ankle 140  59         Medial plantar Sole - Lateral plantar Sole  0.08              Motor NCS      Nerve / Sites Muscle Latency Amplitude Amp % Duration Segments Distance Lat Diff Velocity     ms mV % ms  mm ms m/s   L Peroneal - EDB      Ankle EDB 4.63 6.3 100 5.83 Ankle - EDB 80        Fib head EDB 11.10 6.1 96.3 6.27 Fib head - Ankle 310 6.48 48      Pop fossa EDB 12.71 6.0 95.4 6.40 Pop fossa - Fib head 80 1.60 50   L Tibial - AH      Ankle AH 4.46 7.0 100 5.02 Ankle - AH 80        Pop fossa AH 14.42 5.6 80.9 6.08 Pop fossa - Ankle 450 9.96 45           EMG Summary Table     Spontaneous MUAP Recruitment   Muscle Nerve Roots IA Fib PSW Fasc H.F. Amp Dur. PPP Pattern   L. Rectus femoris Femoral L2-L4 N None None None None N N N N   L. Extensor digitorum brevis Tibial L5-S1 N None None None None N N N N   L. Abductor hallucis Tibial S1-S2 1+ None None None None 1+ N 1+ 1+   L.  Gastrocnemius (Medial head) Tibial S1-S2 1+ None None None None N N 1+ 1+     INTERPRETATION  -Left superficial peroneal sensory nerve conduction study showed normal peak latency and amplitude  -Left sural sensory nerve conduction study showed normal peak latency and amplitude  -Left peroneal motor nerve conduction study showed normal latency, amplitude, and conduction velocity  -Left tibial motor nerve conduction study showed normal latency, amplitude, and conduction velocity  -Needle EMG examination performed to above mentioned muscles     IMPRESSION  ABNORMAL study  2. There is electrodiagnostic evidence of a a mild subacute on chronic radiculopathy of the left S1 nerve root      PLAN  Discussed in detail for greater than 30 minutes about diagnosis and treatment plan    1. Follow up with referring provider: Fabienne Phipps  2. Rec referral to PT for core strength, light traction. Handouts on core/hip exercises provided.  3. This study is good for one year. If symptoms worsen or do not improve, please re-consult.    Becca Telles M.D.  Physical Medicine and Rehab

## 2023-04-03 ENCOUNTER — TELEPHONE (OUTPATIENT)
Dept: PRIMARY CARE CLINIC | Facility: CLINIC | Age: 63
End: 2023-04-03
Payer: COMMERCIAL

## 2023-04-03 DIAGNOSIS — M79.672 FOOT PAIN, BILATERAL: Primary | ICD-10-CM

## 2023-04-03 DIAGNOSIS — M79.671 FOOT PAIN, BILATERAL: Primary | ICD-10-CM

## 2023-04-10 ENCOUNTER — CLINICAL SUPPORT (OUTPATIENT)
Dept: REHABILITATION | Facility: HOSPITAL | Age: 63
End: 2023-04-10
Payer: COMMERCIAL

## 2023-04-10 DIAGNOSIS — Z78.9 DECREASED ACTIVITIES OF DAILY LIVING (ADL): ICD-10-CM

## 2023-04-10 DIAGNOSIS — R20.2 NUMBNESS AND TINGLING IN LEFT HAND: Primary | ICD-10-CM

## 2023-04-10 DIAGNOSIS — G56.00 CARPAL TUNNEL SYNDROME, UNSPECIFIED LATERALITY: ICD-10-CM

## 2023-04-10 DIAGNOSIS — R20.0 NUMBNESS AND TINGLING IN LEFT HAND: Primary | ICD-10-CM

## 2023-04-10 DIAGNOSIS — R29.898 DECREASED GRIP STRENGTH OF LEFT HAND: ICD-10-CM

## 2023-04-10 PROCEDURE — 97165 OT EVAL LOW COMPLEX 30 MIN: CPT

## 2023-04-10 PROCEDURE — 97110 THERAPEUTIC EXERCISES: CPT

## 2023-04-10 NOTE — PLAN OF CARE
[facility]   Occupational Therapy Initial Evaluation     Date: 4/10/2023  Name: Kilo Dailey  [facility] Number: [id number]    Therapy Diagnosis:   Encounter Diagnoses   Name Primary?    Carpal tunnel syndrome, unspecified laterality     Numbness and tingling in left hand Yes    Decreased  strength of left hand     Decreased activities of daily living (ADL)      Physician: Kitty Ball MD    Physician Orders: Occupational Therapy to Evaluate and Treat  Medical Diagnosis: G56.00 (ICD-10-CM) - Carpal tunnel syndrome, unspecified laterality  Evaluation Date: 4/10/2023  Insurance Authorization Period Expiration: 4/10/2023 - 12/31/2023  Plan of Care Certification Period: 4/10/2023 - 7/10/2023  Progress Note Due: 5/10/2023     Visit # / Visits authorized: 1/1  FOTO: 1/3    Surgical Procedure: N/A  Date of Return to MD: N/A    Precautions:  Standard    Time In: 9:00  Time Out: 9:45  Total Appointment Time (timed & untimed codes): 45 minutes    Subjective     Date of Onset: Over a year ago, recent flare up    Mechanism of Injury/ History of Current Condition: Patient reports tingling in left upper extremity of 4th and 5th digit for a while 1+ years, but recently has had a flare.     Involved Side: Left side  Dominant Side: Ambidextrous    Imaging: EMG 3/08/2023: Results demonstrate carpal tunnel syndrome bilaterally and cubital tunnel syndrome on left upper extremity     Previous Therapy: Has had physical therapy for shoulders in the past     Patient's Goals for Therapy: Patient reported goals are to decrease overall pain and symptoms levels in order to return to prior functional level.    Falls: None     Pain:  Functional Pain Scale Rating 0-10:   4/10 on average  5/10 at best  0/10 at worst  Location: bilateral elbows  Description: Aching  Aggravating Factors: flexion,   Easing Factors: rest    Occupation: Retire   Retired by building a house  Working Presently:  Retired    Functional Limitations/Social History:    Prior Level of Function: Independent and pain free with all ADL, IADL, community mobility and functional activities.   Current Level of Function: Independent with all ADL, IADL, community mobility and functional activities with reports of increased pain and need for increased time and frequent breaks.    Limitation of Functional Status as follows:   ADLs/IADLs:     - Feeding: modified independent with symptoms    - Bathing: modified independent with symptoms    - Dressing modified independent with symptoms  - Grooming: modified independent with symptoms    - Driving: modified independent with symptoms  - Leisure: Construction projects and [facility] [employer]    Home/Living Environment : lives with their family  Home Access: Single Story  DME: none      Past Medical History/Physical Systems Review:   Medical History:   Past Medical History:   Diagnosis Date    Allergic rhinitis     BPH (benign prostatic hyperplasia)     Colon polyps     Hyperlipidemia     Tennis elbow     Tinea versicolor        Surgical History:    has a past surgical history that includes Colonoscopy (N/A, 04/25/2017); Cataract extraction w/  intraocular lens implant (Right, 12/07/2017); Cataract extraction w/  intraocular lens implant (Left, 12/06/2018); Eye surgery (2 years ago); and Colonoscopy (N/A, 08/11/2022).    Medications:   has a current medication list which includes the following prescription(s): atorvastatin, clindamycin, clobetasol, doxycycline, erythromycin, glucosamine-chondroitin, ketoconazole, and mupirocin.    Allergies:   Review of patient's allergies indicates:  No Known Allergies       Objective     Observation/Inspection:  Patient reports tenderness over cubital tunnel     Sensation: Intact to light touch. Paresthesias reported.      Active Range of Motion:    Joint Evaluation  AROM   AROM   Pain/Dysfunction with movement Goal    Left Right     Shoulder Flexion 0-180 Within  normal limits  Within normal limits      Shoulder Abduction 0-180       Shoulder External Rotation 0-90       Shoulder Internal Rotation 0-90       Shoulder Extension 0-60       Shoulder Horizontal Adduction 0-90       Elbow Flexion 0-150   Increases symptoms  No symptoms    Elbow Extension 0   Increases symptoms  No symptoms    Wrist Flexion 0-80       Wrist Extension 0-70       Wrist Supination 0-80       Wrist Pronation 0-80       Wrist Ulnar Deviation 0-30       Wrist Radial Deviation 0-20             Manual Muscles Test:  Strength Left Right Goal         Shoulder Flexion 5/5 5/5    Shoulder Abduction      Shoulder Internal Rotation      Shoulder External Rotation      Elbow Flexion      Elbow Extension      Pronation 4+/5 4+/5 5/5   Supination      Radial Deviation      Ulnar Deviation      Wrist Flexion      Wrist Extension           and Pinch Strength (in pounds, psi's):   Left Right Goal    4/10/2023 4/10/2023 Left    II 90 95    Lateral 25 19 23   Tripod 19 19    Tip 12 12           CMS Impairment/Limitation/Restriction for FOTO Elbow Survey    Therapist reviewed FOTO scores for Kilo Dailey on 4/10/2023.   FOTO documents entered into [employer] - see Media section.    Limitation Score: 22%          Treatment     Total Treatment time (time-based codes) separate from Evaluation: 10 minutes      Kilo received therapeutic exercises for 10 minutes including:  - Median and Ulnar nerve glides 10x  - Carpal tunnel stretch - 5x - 15 second holds  - Forearm flexor/extensor stretch 5x - 10 second holds  - Tendon Glides 10x      Home Exercise Program/Education:    Education provided:   Patient educated on the impairments noted above and the effects of Occupational therapy intervention to improve overall condition and Quality of Life.   Patient was educated on all the above exercise prior/during/after for proper posture, positioning, and execution for safe performance with home exercise  program.  Patient/Family Education: role of Occupational Therapy, goals for Occupational Therapy, scheduling/cancellations - patient verbalized understanding  Home Exercise Program - See Below    Written Home Exercises Provided: yes.  Exercises were reviewed and Kilo was able to demonstrate them prior to the end of the session. Patient was advised to perform these exercises free of pain, and to stop performing them if pain occurs.  Kilo demonstrated fair  understanding of the education provided.     See EMR under Patient Instructions for exercises provided 4/10/2023.    Assessment     Kilo Dailey is a 62 y.o. male referred to outpatient occupational therapy and presents with a medical diagnosis of Carpal tunnel syndrome, unspecified laterality.  Patient presents with the following therapy deficits: Decreased  strength, Decreased pinch strength, Decreased muscle strength, Decreased functional hand use, and Diminished/Impaired Sensation and demonstrates limitations as described in the chart below. Following medical record review, it is determined that the patient will benefit from occupational therapy services in order to maximize pain free and/or functional use of left upper extremity. The following goals were discussed with the patient and patient's spiritual, cultural and educational needs considered. Patient is in agreement with plan of care and goals as to be addressed in the treatment plan. The patient's rehabilitation potential is Excellent.     Anticipated barriers to occupational therapy: Ergonomics  Patient has no cultural, educational or language barriers to learning provided.    Medical Necessity is demonstrated by the following  Profile and History Assessment of Occupational Performance Level of Clinical Decision Making Complexity Score   Occupational Profile:   Kilo Dailey is a 62 y.o. male who lives with their family and is currently Retired. Kilo Dailey has difficulty  with  bathing and  cleaning/home management and yard work  affecting his daily functional abilities. his  main goal for therapy is improve symptoms.      Comorbidities:    has a past medical history of Allergic rhinitis, BPH (benign prostatic hyperplasia), Colon polyps, Hyperlipidemia, Tennis elbow, and Tinea versicolor.    Medical and Therapy History Review:   Expanded               Performance Deficits    Physical:  Joint Mobility  Muscle Power/Strength  Muscle Endurance   Strength  Pinch Strength  Tactile Functions    Cognitive:  No Deficits    Psychosocial:    No Deficits     Clinical Decision Making:  low    Assessment Process:  Problem-Focused Assessments    Modification/Need for Assistance:  Not Necessary    Intervention Selection:  Several Treatment Options       low  Based on past medical history, co-morbidities, data from assessments and functional level of assistance required with task and clinical presentation directly impacting function.       The following goals were discussed with the patient and patient is in agreement with them as to be addressed in the treatment plan.     Goals:    Short Term Goals:  6 weeks  Progress   4/10/2023   Pain: Patient will demonstrate improved pain by reports of less than or equal to 4/10 worst pain on the verbal rating scale in order to progress toward maximal functional ability and improve quality of life. PC   Function: Patient will demonstrate improved function as indicated by a functional limitation score of less than or equal to 22 out of 100 on FOTO. PC   Mobility: Patient will improve AROM to 50% of stated goals, listed in objective measures above, in order to progress towards independence with functional activities.  PC   Strength: Patient will improve strength to 50% of stated goals, listed in objective measures above, in order to progress towards independence with functional activities.  PC   HEP: Patient will demonstrate independence with HEP in order to  progress toward functional independence. PC     Long Term Goals:  12 weeks  Progress  4/10/2023   Pain: Patient will demonstrate improved pain by reports of less than or equal to 2/10 worst pain on the verbal rating scale in order to progress toward maximal functional ability and improve quality of life.   PC   Function: Patient will demonstrate improved function as indicated by a functional limitation score of less than or equal to 21 out of 100 on FOTO. PC   Mobility: Patient will improve AROM to stated goals, listed in objective measures above, in order to return to maximal functional potential and improve quality of life. PC   Strength: Patient will improve strength to stated goals, listed in objective measures above, in order to improve functional independence and quality of life. PC   Patient will return to normal ADL's, IADL's, community involvement, recreational activities, and work-related activities with less than or equal to 2/10 pain and maximal function.  PC     Goals Key:  PC= Progressing/Continue; PM= Partially Met;  Goal Met= Goal Met      DC= Discontinue    Plan   Plan of Care Certification: 4/10/2023 to 07/10/2023.     Outpatient Occupational Therapy 2 times weekly for 12 weeks to include the following interventions:  Therapeutic Exercise, Functional Activities, Patient Education, Home Exercise Program, ADL Training, Transfer/Mobility Training, Manual Therapy, Neuromuscular Reeducation/Sensory, and Moist Heat/Ice/Paraffin.    Plan Next Visit: Progress patient poc per patient tolerant    Ladonna Tucker OT ,OTD, OTR/L      I CERTIFY THE NEED FOR THESE SERVICES FURNISHED UNDER THIS PLAN OF TREATMENT AND WHILE UNDER MY CARE  Physician's comments:      Physician's Signature: ___________________________________________________

## 2023-04-14 ENCOUNTER — CLINICAL SUPPORT (OUTPATIENT)
Dept: REHABILITATION | Facility: HOSPITAL | Age: 63
End: 2023-04-14
Payer: COMMERCIAL

## 2023-04-14 DIAGNOSIS — R20.2 NUMBNESS AND TINGLING IN LEFT HAND: ICD-10-CM

## 2023-04-14 DIAGNOSIS — R20.0 NUMBNESS AND TINGLING IN LEFT HAND: ICD-10-CM

## 2023-04-14 DIAGNOSIS — R29.898 DECREASED GRIP STRENGTH OF LEFT HAND: Primary | ICD-10-CM

## 2023-04-14 DIAGNOSIS — Z78.9 DECREASED ACTIVITIES OF DAILY LIVING (ADL): ICD-10-CM

## 2023-04-14 PROCEDURE — 97140 MANUAL THERAPY 1/> REGIONS: CPT | Performed by: OCCUPATIONAL THERAPIST

## 2023-04-14 PROCEDURE — 97110 THERAPEUTIC EXERCISES: CPT | Performed by: OCCUPATIONAL THERAPIST

## 2023-04-14 NOTE — PROGRESS NOTES
OCCUPATIONAL THERAPY DAILY NOTE    Name: Kilo [facility] Number: [id number]    Therapy Diagnosis:   Encounter Diagnoses   Name Primary?    Decreased  strength of left hand Yes    Numbness and tingling in left hand     Decreased activities of daily living (ADL)      Physician: Kitty Ball MD    Visit Date: 4/14/2023  Physician Orders: Occupational Therapy to Evaluate and Treat  Medical Diagnosis: G56.00 (ICD-10-CM) - Carpal tunnel syndrome, unspecified laterality  Evaluation Date: 4/10/2023  Insurance Authorization Period Expiration: 4/10/2023 - 12/31/2023  Plan of Care Certification Period: 4/10/2023 - 7/10/2023  Progress Note Due: 5/10/2023      Visit # / Visits authorized: 0/24  episode 2  FOTO: 1/3     Surgical Procedure: N/A  Date of Return to MD: N/A     Precautions:  Standard     Time In: 1045  Time Out: 1130  Total Appointment Time (timed & untimed codes): 45 minutes       SUBJECTIVE     Today, pt reports: he just started doing his exercises. He complains of right upper trap tightness with pec stretches.  .  He/She was compliant with home exercise program.  Response to previous treatment: required verbal cues to perform his exercises correctly/decreased overall symptms  Functional change: NA    Pre-Treatment Pain: 0/10  Post-Treatment Pain: 0/10  Location: NA  TREATMENT     Kilo received therapeutic exercises to develop strength, endurance, ROM, flexibility, posture, and core stabilization for 32 minutes including:    Exercise 4/14/2023   Tendon glides bilateral hands 10-20x x   Median nerve glides bilateral hands 10x    Median nerve stretches on wall 5/5 second holds x   Pectoralis stretches supine:  W/T/V with 1/2 roll x   Thoracic spine stretches on 1/2 roll x           Kilo received the following manual therapy techniques: Soft tissue Mobilization were applied to the: bilateral upper trapezius muscles for 5 minutes, including:  STM of bilateral upper trapezius, levator scapulae        Kilo participated in neuromuscular re-education activities to improve: Posture for 8 minutes. The following activities were included:    Exercise 4/14/2023   Side lying external rotation with scapular retraction 2# 20x  bilateral shoulders   Low rows with scapular retractions 2# x   Bilateral external rotation red x                             Kilo received the following supervised modalities after being cleared for contradictions:     Kilo received hot pack for 0 minutes to 0.    Kilo received cold pack for 0 minutes.      Home Exercises Provided and Patient Education Provided     Education/Self-Care provided: (5 minutes)  Patient educated on biomechanical justification for therapeutic exercise and importance of compliance with HEP in order to improve overall impairments and QOL   Patient educated on postural awareness.   Patient educated on proper ergonomics at the work station in order to maintain optimal alignment of the musculoskeletal system and improve efficiency in the work environment.  Carpal and ulnar tunnel education with activity modification and positioning    Written Home Exercises Provided: yes.  Exercises were reviewed and Kilo was able to demonstrate them prior to the end of the session.  Kilo demonstrated good  understanding of the education provided.     See EMR under Patient Instructions for exercises provided 4/14/2023.    ASSESSMENT   Pt tolerated manual therapy well with reports of decreased pain and tension in musculature following intervention. Pt tolerated exercise well with reports of increased fatigue but no increased pain. Pt demonstrated good understanding of exercises and required minimal cueing to maintain proper form.  Patient overusing his levator and upper trapezius muscles while working around the house secondary to posterior and rotator cuff weakness. Patient reports decreased ridiculer symptoms of bilateral hands.    Kilo Is progressing well towards his  goals.   Pt prognosis is Good.     Pt will continue to benefit from skilled outpatient occupational therapy to address the deficits listed in the problem list box on initial evaluation, provide pt/family education and to maximize pt's level of independence in the home and community environment.     Pt's spiritual, cultural and educational needs considered and pt agreeable to plan of care and goals.     Anticipated barriers to occupational therapy: compliance with home exercise program/attention span with focus on performing his exercises properly.    Goals:   Short Term Goals:  6 weeks  Progress   4/142/023   Pain: Patient will demonstrate improved pain by reports of less than or equal to 4/10 worst pain on the verbal rating scale in order to progress toward maximal functional ability and improve quality of life. PM   Function: Patient will demonstrate improved function as indicated by a functional limitation score of less than or equal to 22 out of 100 on FOTO. PC   Mobility: Patient will improve AROM to 50% of stated goals, listed in objective measures above, in order to progress towards independence with functional activities.  PC   Strength: Patient will improve strength to 50% of stated goals, listed in objective measures above, in order to progress towards independence with functional activities.  PC   HEP: Patient will demonstrate independence with HEP in order to progress toward functional independence. PC      Long Term Goals:  12 weeks  Progress  4/14/2023   Pain: Patient will demonstrate improved pain by reports of less than or equal to 2/10 worst pain on the verbal rating scale in order to progress toward maximal functional ability and improve quality of life.   PC   Function: Patient will demonstrate improved function as indicated by a functional limitation score of less than or equal to 21 out of 100 on FOTO. PC   Mobility: Patient will improve AROM to stated goals, listed in objective measures above,  in order to return to maximal functional potential and improve quality of life. PC   Strength: Patient will improve strength to stated goals, listed in objective measures above, in order to improve functional independence and quality of life. PC   Patient will return to normal ADL's, IADL's, community involvement, recreational activities, and work-related activities with less than or equal to 2/10 pain and maximal function.  PC      Goals Key:  PC= Progressing/Continue;       PM= Partially Met;       Goal Met= Goal Met      DC= Discontinue       PLAN   Continue Plan of Care (POC) and progress per patient tolerance.    Arelis Herrera, OTR, RELLR

## 2023-04-20 ENCOUNTER — CLINICAL SUPPORT (OUTPATIENT)
Dept: REHABILITATION | Facility: HOSPITAL | Age: 63
End: 2023-04-20
Payer: COMMERCIAL

## 2023-04-20 DIAGNOSIS — R20.2 NUMBNESS AND TINGLING IN LEFT HAND: ICD-10-CM

## 2023-04-20 DIAGNOSIS — R29.898 DECREASED GRIP STRENGTH OF LEFT HAND: Primary | ICD-10-CM

## 2023-04-20 DIAGNOSIS — R20.0 NUMBNESS AND TINGLING IN LEFT HAND: ICD-10-CM

## 2023-04-20 DIAGNOSIS — Z78.9 DECREASED ACTIVITIES OF DAILY LIVING (ADL): ICD-10-CM

## 2023-04-20 PROCEDURE — 97112 NEUROMUSCULAR REEDUCATION: CPT

## 2023-04-20 PROCEDURE — 97110 THERAPEUTIC EXERCISES: CPT

## 2023-04-20 PROCEDURE — 97140 MANUAL THERAPY 1/> REGIONS: CPT

## 2023-04-20 NOTE — PROGRESS NOTES
OCCUPATIONAL THERAPY DAILY NOTE    Name: Kilo [facility] Number: [id number]    Therapy Diagnosis:   Encounter Diagnoses   Name Primary?    Decreased  strength of left hand Yes    Numbness and tingling in left hand     Decreased activities of daily living (ADL)      Physician: Kitty Ball MD    Visit Date: 4/20/2023  Physician Orders: Occupational Therapy to Evaluate and Treat  Medical Diagnosis: G56.00 (ICD-10-CM) - Carpal tunnel syndrome, unspecified laterality  Evaluation Date: 4/10/2023  Insurance Authorization Period Expiration: 4/10/2023 - 12/31/2023  Plan of Care Certification Period: 4/10/2023 - 7/10/2023  Progress Note Due: 5/10/2023      Visit # / Visits authorized: 0/24  (3 Episode Visits)  FOTO: 1/3     Surgical Procedure: N/A  Date of Return to MD: N/A     Precautions:  Standard     Time In: 10:30  Time Out: 11:15  Total Appointment Time (timed & untimed codes): 45 minutes       SUBJECTIVE     Today, pt reports: he just started doing his exercises. He hasn't noticed much improvements  .  He/She was compliant with home exercise program.  Response to previous treatment: required verbal cues to perform his exercises correctly/decreased overall symptms  Functional change: NA    Pre-Treatment Pain: 0/10  Post-Treatment Pain: 0/10  Location: NA      OBJECTIVE     Please see updated objectives at evaluation dated     TREATMENT     Kilo received therapeutic exercises to develop strength, endurance, ROM, flexibility, posture, and core stabilization for 25 minutes including:    Exercise 4/20/2023   Tendon glides bilateral hands 10-20x x   Median nerve glides bilateral hands 10x x   Median nerve stretches on wall 5/5 second holds x   Pectoralis stretches supine:  W/T/V with 1/2 roll x   Thoracic spine stretches on 1/2 roll x           Kilo received the following manual therapy techniques: Soft tissue Mobilization were applied to the: bilateral upper trapezius muscles for 10 minutes,  including:  STM of bilateral upper trapezius, levator scapulae       Kilo participated in neuromuscular re-education activities to improve: Posture for 10 minutes. The following activities were included:    Exercise 4/20/2023   Side lying external rotation with scapular retraction 2# 20x  bilateral shoulders   Low rows with scapular retractions 2# x   Bilateral external rotation red x   Forearm Stretch  - wrist flexion  - wrist extension x                         iKlo received the following supervised modalities after being cleared for contradictions:     Kilo received hot pack for 0 minutes to 0.    Kilo received cold pack for 0 minutes.      Home Exercises Provided and Patient Education Provided     Education/Self-Care provided: (5 minutes)  Patient educated on biomechanical justification for therapeutic exercise and importance of compliance with HEP in order to improve overall impairments and QOL   Patient educated on postural awareness.   Patient educated on proper ergonomics at the work station in order to maintain optimal alignment of the musculoskeletal system and improve efficiency in the work environment.  Carpal and ulnar tunnel education with activity modification and positioning    Written Home Exercises Provided: yes.  Exercises were reviewed and Kilo was able to demonstrate them prior to the end of the session.  Kilo demonstrated good  understanding of the education provided.     See EMR under Patient Instructions for exercises provided 4/14/2023.    ASSESSMENT   Pt tolerated manual therapy well with reports of decreased pain and tension in musculature following intervention. Pt tolerated exercise well with reports of increased fatigue but no increased pain. Pt demonstrated good understanding of exercises and required minimal cueing to maintain proper form.  Patient overusing his levator and upper trapezius muscles while working around the house secondary to posterior and rotator cuff  weakness. Patient reports decreased ridiculer symptoms of bilateral hands.    Kilo Is progressing well towards his goals.   Pt prognosis is Good.     Pt will continue to benefit from skilled outpatient occupational therapy to address the deficits listed in the problem list box on initial evaluation, provide pt/family education and to maximize pt's level of independence in the home and community environment.     Pt's spiritual, cultural and educational needs considered and pt agreeable to plan of care and goals.     Anticipated barriers to occupational therapy: compliance with home exercise program/attention span with focus on performing his exercises properly.    Goals:   Short Term Goals:  6 weeks  Progress   4/142/023   Pain: Patient will demonstrate improved pain by reports of less than or equal to 4/10 worst pain on the verbal rating scale in order to progress toward maximal functional ability and improve quality of life. PM   Function: Patient will demonstrate improved function as indicated by a functional limitation score of less than or equal to 22 out of 100 on FOTO. PC   Mobility: Patient will improve AROM to 50% of stated goals, listed in objective measures above, in order to progress towards independence with functional activities.  PC   Strength: Patient will improve strength to 50% of stated goals, listed in objective measures above, in order to progress towards independence with functional activities.  PC   HEP: Patient will demonstrate independence with HEP in order to progress toward functional independence. PC      Long Term Goals:  12 weeks  Progress  4/14/2023   Pain: Patient will demonstrate improved pain by reports of less than or equal to 2/10 worst pain on the verbal rating scale in order to progress toward maximal functional ability and improve quality of life.   PC   Function: Patient will demonstrate improved function as indicated by a functional limitation score of less than or equal to  21 out of 100 on FOTO. PC   Mobility: Patient will improve AROM to stated goals, listed in objective measures above, in order to return to maximal functional potential and improve quality of life. PC   Strength: Patient will improve strength to stated goals, listed in objective measures above, in order to improve functional independence and quality of life. PC   Patient will return to normal ADL's, IADL's, community involvement, recreational activities, and work-related activities with less than or equal to 2/10 pain and maximal function.  PC      Goals Key:  PC= Progressing/Continue;       PM= Partially Met;       Goal Met= Goal Met      DC= Discontinue       PLAN   Continue Plan of Care (POC) and progress per patient tolerance.    Ladonna Tucker OT, NIKKI

## 2023-04-24 ENCOUNTER — CLINICAL SUPPORT (OUTPATIENT)
Dept: REHABILITATION | Facility: HOSPITAL | Age: 63
End: 2023-04-24
Payer: COMMERCIAL

## 2023-04-24 DIAGNOSIS — R20.2 NUMBNESS AND TINGLING IN LEFT HAND: ICD-10-CM

## 2023-04-24 DIAGNOSIS — M79.672 FOOT PAIN, BILATERAL: ICD-10-CM

## 2023-04-24 DIAGNOSIS — R29.898 DECREASED GRIP STRENGTH OF LEFT HAND: Primary | ICD-10-CM

## 2023-04-24 DIAGNOSIS — M25.60 DECREASED RANGE OF MOTION: ICD-10-CM

## 2023-04-24 DIAGNOSIS — R53.1 DECREASED STRENGTH: ICD-10-CM

## 2023-04-24 DIAGNOSIS — M79.671 FOOT PAIN, BILATERAL: ICD-10-CM

## 2023-04-24 DIAGNOSIS — Z78.9 DECREASED ACTIVITIES OF DAILY LIVING (ADL): ICD-10-CM

## 2023-04-24 DIAGNOSIS — R20.0 NUMBNESS AND TINGLING IN LEFT HAND: ICD-10-CM

## 2023-04-24 PROCEDURE — 97161 PT EVAL LOW COMPLEX 20 MIN: CPT

## 2023-04-24 PROCEDURE — 97110 THERAPEUTIC EXERCISES: CPT | Performed by: OCCUPATIONAL THERAPIST

## 2023-04-24 PROCEDURE — 97110 THERAPEUTIC EXERCISES: CPT

## 2023-04-24 PROCEDURE — 97140 MANUAL THERAPY 1/> REGIONS: CPT | Performed by: OCCUPATIONAL THERAPIST

## 2023-04-24 PROCEDURE — 97112 NEUROMUSCULAR REEDUCATION: CPT | Performed by: OCCUPATIONAL THERAPIST

## 2023-04-24 NOTE — PROGRESS NOTES
OCCUPATIONAL THERAPY DAILY NOTE    Name: Kilo [facility] Number: [id number]    Therapy Diagnosis:   Encounter Diagnoses   Name Primary?    Decreased  strength of left hand Yes    Numbness and tingling in left hand     Decreased activities of daily living (ADL)      Physician: Kitty Ball MD    Visit Date: 4/24/2023  Physician Orders: Occupational Therapy to Evaluate and Treat  Medical Diagnosis: G56.00 (ICD-10-CM) - Carpal tunnel syndrome, unspecified laterality  Evaluation Date: 4/10/2023  Insurance Authorization Period Expiration: 4/10/2023 - 12/31/2023  Plan of Care Certification Period: 4/10/2023 - 7/10/2023  Progress Note Due: 5/10/2023      Visit # / Visits authorized: 1/24  (4 Episode Visits)  FOTO: 1/3     Surgical Procedure: N/A  Date of Return to MD: N/A     Precautions:  Standard     Time In: 1:30 pm  Time Out: 2:15 pm  Total Appointment Time (timed & untimed codes): 45 minutes       SUBJECTIVE     Today, pt reports: he just worked out this morning to include biceps curls. He is doing fine with occasional numbness and tingling in the left hand on and off during the day but not at night. He is keeping his elbow straight at night on a pillow per his report.  .  He/She was compliant with home exercise program.  Response to previous treatment: required verbal cues to perform his exercises correctly/decreased overall symptms  Functional change: weight lifting and sleeping without symptoms    Pre-Treatment Pain: 2/10  Post-Treatment Pain: 0/10  Location: NA      OBJECTIVE     Please see updated objectives at evaluation dated     Positive tinel's at cubital tunnel on left.    TREATMENT     Kilo received therapeutic exercises to develop strength, endurance, ROM, flexibility, posture, and core stabilization for 25 minutes including:    Exercise 4/24/2023   Tendon glides bilateral hands 10-20x x   Median nerve glides bilateral hands 10x x   Median nerve stretches on wall 5/5 second holds x    Pectoralis stretches supine:  W/T/V with 1/2 roll x   Thoracic spine stretches on 1/2 roll x           Kilo received the following manual therapy techniques: Soft tissue Mobilization were applied to the: bilateral upper trapezius muscles for 10 minutes, including:  STM of bilateral upper trapezius, levator scapulae and forearm and distal upper arm and elbow and subscapularis muscle.      Kilo participated in neuromuscular re-education activities to improve: Posture for 10 minutes. The following activities were included:    Exercise 4/24/2023   Side lying external rotation with scapular retraction 2# 20x  bilateral shoulders   Low rows with scapular retractions blue band x   Bilateral external rotation red x   Forearm Stretch  - wrist flexion  - wrist extension x   Ulnar nerve glides  10x x   Ulnar nerve stretches  goggle 10/5 second holds x                 Kilo received the following supervised modalities after being cleared for contradictions:     Kilo received hot pack for 0 minutes to 0.    Kilo received cold pack for 0 minutes.      Home Exercises Provided and Patient Education Provided     Education/Self-Care provided: (5 minutes)  Patient educated on biomechanical justification for therapeutic exercise and importance of compliance with HEP in order to improve overall impairments and QOL   Patient educated on postural awareness during band exercises.  Patient educated on proper ergonomics at the work station in order to maintain optimal alignment of the musculoskeletal system and improve efficiency in the work environment.  Recommended he stop biceps curls or perform isometrics or small arc movement.  Recommended elbow for his left elbow to be worn during the day.    Written Home Exercises Provided: yes.  Exercises were reviewed and Kilo was able to demonstrate them prior to the end of the session.  Kilo demonstrated good  understanding of the education provided.     See EMR under Patient  Instructions for exercises provided 4/14/2023.    ASSESSMENT   Pt tolerated manual therapy well with reports of decreased pain and tension in musculature following intervention. Pt tolerated exercise well with reports of increased fatigue but no increased pain. Pt demonstrated good understanding of exercises and required minimal cueing to maintain proper form.  Patient appears to be improving, would benefit from continued work on postural awareness and activity modification.    Kilo Is progressing well towards his goals.   Pt prognosis is Good.     Pt will continue to benefit from skilled outpatient occupational therapy to address the deficits listed in the problem list box on initial evaluation, provide pt/family education and to maximize pt's level of independence in the home and community environment.     Pt's spiritual, cultural and educational needs considered and pt agreeable to plan of care and goals.     Anticipated barriers to occupational therapy: compliance with home exercise program/attention span with focus on performing his exercises properly.    Goals:   Short Term Goals:  6 weeks  Progress   4/24/2023   Pain: Patient will demonstrate improved pain by reports of less than or equal to 4/10 worst pain on the verbal rating scale in order to progress toward maximal functional ability and improve quality of life. PM   Function: Patient will demonstrate improved function as indicated by a functional limitation score of less than or equal to 22 out of 100 on FOTO. PC   Mobility: Patient will improve AROM to 50% of stated goals, listed in objective measures above, in order to progress towards independence with functional activities.  PM   Strength: Patient will improve strength to 50% of stated goals, listed in objective measures above, in order to progress towards independence with functional activities.  PC   HEP: Patient will demonstrate independence with HEP in order to progress toward functional  independence. PM      Long Term Goals:  12 weeks  Progress  4/24/2023   Pain: Patient will demonstrate improved pain by reports of less than or equal to 2/10 worst pain on the verbal rating scale in order to progress toward maximal functional ability and improve quality of life.   PC   Function: Patient will demonstrate improved function as indicated by a functional limitation score of less than or equal to 21 out of 100 on FOTO. PC   Mobility: Patient will improve AROM to stated goals, listed in objective measures above, in order to return to maximal functional potential and improve quality of life. PC   Strength: Patient will improve strength to stated goals, listed in objective measures above, in order to improve functional independence and quality of life. PC   Patient will return to normal ADL's, IADL's, community involvement, recreational activities, and work-related activities with less than or equal to 2/10 pain and maximal function.  PC      Goals Key:  PC= Progressing/Continue;       PM= Partially Met;       Goal Met= Goal Met      DC= Discontinue       PLAN   Continue Plan of Care (POC) and progress per patient tolerance.    GHULAM Saucedo, NIKKI

## 2023-04-24 NOTE — PLAN OF CARE
"[facility]   Physical Therapy Initial Evaluation   Date: 4/24/2023   Name: Kilo [facility] Number: [id number]    Therapy Diagnosis:    Encounter Diagnoses   Name Primary?    Foot pain, bilateral     Decreased range of motion     Decreased strength       Physician: Kitty Ball MD     Physician Orders: PT Eval and Treat  Medical Diagnosis from Referral: Foot pain, bilateral  Evaluation Date: 4/24/2023  Authorization Period Expiration: 5/24/23  Plan of Care Expiration: 6/24/23  Progress Note Due: 5/24/2023  Visit # / Visits authorized: 1/1  FOTO: 1/3 (last performed on 4/24/2023)    Precautions: Standard    Time In: 2:20 pm  Time Out: 3:10 pm  Total Billable Time (timed & untimed codes): 50 minutes    SUBJECTIVE   Date of onset: A few months ago    History of current condition - Kilo reports patient reports that he started to have numbness and tingling in his left middle foot - all the way through.  He reports that he is building a house in [facility], and is almost done. He is building cabinets in the kitchen, he does not note any pain while working but does note numbness and tingling if stretches legs out in long sitting.  He reports that he had a nerve study and it showed that he has an irritation of a nerve in his back, he is pretty sure this is what is causing his pain.  He reports a lot of "tightness" in his legs especially his hamstrings and that he does not stretch a lot.    Current Activity Level: Working out 3 days per week and doing the finish work on building house - cabinet painting, hang doors, finish work.      Falls: [x] No  [] Yes:     Imaging: [] Xray [] MRI [] CT: Nerve testing: IMPRESSION:  ABNORMAL study  2. There is electrodiagnostic evidence of a a mild subacute on chronic radiculopathy of the left S1 nerve root       Prior Therapy:  [] No  [x] Yes:   Social History: Pt lives with their spouse [x] House [] Apartment/Condo []other  Occupation: Patient " is retired  Prior Level of Function: un-restricted with activity.  Current Level of Function:  numbness/tingling in long sitting position    Pain:  Current 0/10, worst 0/10, best 0/10   Location: left foot>right foot  Description: Tingling and Numb  Aggravating Factors: long sitting  Easing Factors:  stopping position    Pts goals: make numbness go away.    Medical History:   Past Medical History:   Diagnosis Date    Allergic rhinitis     BPH (benign prostatic hyperplasia)     Colon polyps     Hyperlipidemia     Tennis elbow     Tinea versicolor        Surgical History:   Kilo Dailey  has a past surgical history that includes Colonoscopy (N/A, 04/25/2017); Cataract extraction w/  intraocular lens implant (Right, 12/07/2017); Cataract extraction w/  intraocular lens implant (Left, 12/06/2018); Eye surgery (2 years ago); and Colonoscopy (N/A, 08/11/2022).    Medications:   Kilo has a current medication list which includes the following prescription(s): atorvastatin, clindamycin, clobetasol, doxycycline, erythromycin, glucosamine-chondroitin, ketoconazole, and mupirocin.    Allergies:   Review of patient's allergies indicates:  No Known Allergies     OBJECTIVE     Posture:  Pt presents with postural abnormalities which include:    [] Forward Head   [] Increased Lumbar Lordosis   [] Rounded Shoulder   [x] Flat Back Posture   [] Increased Thoracic Kyphosis [] Pes Planus   [] Increased Trunk Sway  [] Valgus knee position   [] Increased Trunk Rotation  [] Varus knee position   [] Increased cervical lordosis [x] Other: Ectomorphic body type    Sensation:    Sensation to light touch over UE's is  [] Intact [] Impaired [x] Defer  Sensation to light touch over LE's is  [x] Intact [] Impaired [] Defer    Reflexes:  Patellar   [x] Defer [] Normal [] Hyper []  Hypo   Achilles  [x] Defer [] Normal [] Hyper []  Hypo  Tricep  [x] Defer [] Normal [] Hyper []  Hypo  Brachioradialis [x] Defer [] Normal [] Hyper []   Hypo      Gait Analysis: The patient ambulated with the following assistive device: none; the pt presents with the following gait abnormalities: no antalgic gait noted.     Balance  Right   (seconds) Left  (seconds) Pain/dysfunction Noted   Single Leg Stance NT NT                AROM:  Motion: Movement Results:   Multi-Segmental Flexion upper shin   Multi-Segmental Extension 60%   Multi-Segmental Rotation 50%   Multi-Segmental  sidebending lower thigh       Strength:    L/E MMT Right  (spine) Left Pain/Dysfunction with Movement   Modified (90/90) Abdominal Strength  4/5 ---    Hip Flexion  4/5 4/5 Pain right groin   Hip Extension  NT NT    Hip Abduction  4+/5 4+/5    Knee Extension 4+/5 4+/5    Knee Flexion 4+/5 4+/5    Hip IR 4-/5 4-/5    Hip ER 4-/5 4-/5    Ankle DF 4+/5 4+/5    Ankle PF 4+/5 4+/5        Muscle Length:       Muscle Tested  Right Left  Limitation   Hamstrings  [] Normal      [x] Limited [] Normal      [x] Limited    Piriformis  [] Normal      [x] Limited [] Normal      [x] Limited    Gastrocnemius  [] Normal      [x] Limited [] Normal      [x] Limited    Soleus  [] Normal      [x] Limited [] Normal      [x] Limited        Joint Mobility:   defer      Special Tests:     Right  (spine) Left    SLUMP [x] Positive    [] Negative [x] Positive    [] Negative   Straight Leg Raise [x] Positive    [] Negative [x] Positive    [] Negative   REJI [x] Positive    [] Negative [x] Positive    [] Negative       Palpation:  defer      FOTO:    CMS Impairment/Limitation/Restriction for FOTO Lumbar Survey    Therapist reviewed FOTO scores for Kilo on 4/24/2023.   FOTO documents entered into [employer] - see Media section.    Limitation Score: see below           TREATMENT     Total Treatment time (time-based codes) separate from Evaluation: (10) minutes     Kilo received the treatments listed below:   Kilo received therapeutic exercises to develop ROM, flexibility, and core stabilization for 10 minutes  including:    Intervention 4/24/2023  Parameters   HEP x                                                           Plan for Next Visit: Thoracic and LB mobility, quadruped openers, dynamic warm up series, downward dog/upward dog, rodrick pose, donkey/hydrants, cook band for hamstring mobility.        PATIENT EDUCATION AND HOME EXERCISES     Education provided: (during treatment session) minutes  Home exercise program, and importance of functional mobility    Written Home Exercises Provided: yes.  Exercises were reviewed and Kilo was able to demonstrate them prior to the end of the session.  Kilo demonstrated good  understanding of the education provided. See EMR under Patient Instructions for exercises provided during therapy sessions.    ASSESSMENT     Kilo is a 62 y.o. male referred to outpatient Physical Therapy with a medical diagnosis of Foot pain, bilateral. The patient presents with signs and symptoms consistent with diagnosis along with involvement of lumbar spine and impairments which include impaired functional mobility, decreased lower extremity function, abnormal tone, decreased ROM, impaired joint extensibility, and impaired muscle length.      Pt prognosis is Good, if patient is consistent and compliant with Physical Therapy and home exercise program.  Pt will benefit from skilled outpatient Physical Therapy to address the deficits stated above and in the chart below, provide pt/family education, and to maximize pt's level of independence.     Plan of care discussed with patient: Yes  Pt's spiritual, cultural and educational needs considered and patient is agreeable to the plan of care and goals as stated below:     Anticipated Barriers for therapy: co-morbidities    Medical Necessity is demonstrated by the following   History  Co-morbidities and personal factors that may impact the plan of care Co-morbidities:   Past Medical History:   Diagnosis Date    Allergic rhinitis     BPH (benign prostatic  "hyperplasia)     Colon polyps     Hyperlipidemia     Tennis elbow     Tinea versicolor        Personal Factors:   []Age   []Education   [x]Coping style   []Social background   [x]Lifestyle    []Character   []Attitudes   []Other:   []No deficits      []Low   []Moderate  [x]High    Examination  Body Structures and Functions, activity limitations and participation restrictions that may impact the plan of care Body Regions:   []Head  []Neck   [x]Back   [x]Trunk  []Upper extremities   [x]Lower extremities   []Other:      Body Systems:    []Gross symmetry   [x]ROM   [x]Strength   []Gross coordinated movement   []Balance   []Gait  [x] Muscle tone  [] Other:  [x]Transfers  [x]Transitions   []Motor control   []Motor learning   []Scar formation  [x] Joint mobility  [x] Muscle length      Participation Restrictions:   See above in "Current Level of Function"     Activity limitations:   Learning and applying knowledge  [x]No deficits       General Tasks and Commands  [x]No deficits    Communication  [x]No deficits    Mobility   [x]No deficits  []Fine hand use  []Walking   []Driving  [] Other: []Lifting/carrying objects  []Using Transportation   []Moving around using equipment  []Stairs       Self care  [x]No deficits  []Washing oneself   []Caring for body parts   []Toileting   []Dressing  []Eating   []Drinking   []Looking after one's health  []Other:        Domestic Life  [x]No Deficits  []Shopping   []Cooking  []Doing housework  []Assisting others   []Other:      Interactions/Relationships  [x]No deficits    Life Areas  [x]No deficits  [] Employment     Community and Social Life  [x]Community life  [x]Recreation and leisure   []Nondenominational and spirituality  []Human rights   []Political life / citizenship  []No deficits      [x]Low   []Moderate  []High    Clinical Presentation []Stable and uncomplicated   [x]Evolving presentation with changing characteristics  []Unstable presentation with unpredictable characteristics []Low "   [x]Moderate  []High      Decision Making/ Complexity Score:  [x]Low   []Moderate  []High      Goals:  Reviewed:4/24/2023    Short Term Goals: In 4 weeks   1.Patient to be educated on HEP.  2.Patient to increase active range of motion of LB  to WFL's, in order to improve available range of motion for ADL's.    3.Patient to increase bilateral hip flexion, IR and ER strength by 1/2 grade, in order to improve endurance and increase ability to perform all functional activities for increased time.   4.Patient to improve score on the FOTO, to improve QOL.      Long Term Goals: In 8 weeks  1.Patient to improve score on the FOTO to 13% or less, to improve QOL.  2. Patient to increase AROM of LB spine to WNL's in order to improve available range of motion for ADL's.  3. Patient to have decreased numbness and tingling in bilateral feet, to improve QOL.  4. Patient to normalize MLT bilateral LE's hamstrings, and posterior calf muscle's in order to increase ability to perform all functional activities.  5. Patient to perform daily activities including sitting in long sitting without increased symptoms.      PLAN     Plan of care Certification: 4/24/2023  to 6/24/23.    Outpatient Physical Therapy 1 times weekly for 8 weeks to include any combination of the following interventions: Aquatic Therapy, electrical stimulation (PRN), Manual Therapy, Neuromuscular Re-ed, Patient Education/Self Care, Therapeutic Activites, Therapeutic Exercise, Dry Needling, and Moist Hot Pack/Cold Pack    Melissa Loo PT      I CERTIFY THE NEED FOR THESE SERVICES FURNISHED UNDER THIS PLAN OF TREATMENT AND WHILE UNDER MY CARE   Physician's comments:     Physician's Signature: ___________________________________________________

## 2023-04-27 ENCOUNTER — CLINICAL SUPPORT (OUTPATIENT)
Dept: REHABILITATION | Facility: HOSPITAL | Age: 63
End: 2023-04-27
Payer: COMMERCIAL

## 2023-04-27 DIAGNOSIS — R29.898 DECREASED GRIP STRENGTH OF LEFT HAND: Primary | ICD-10-CM

## 2023-04-27 DIAGNOSIS — Z78.9 DECREASED ACTIVITIES OF DAILY LIVING (ADL): ICD-10-CM

## 2023-04-27 DIAGNOSIS — R20.2 NUMBNESS AND TINGLING IN LEFT HAND: ICD-10-CM

## 2023-04-27 DIAGNOSIS — R20.0 NUMBNESS AND TINGLING IN LEFT HAND: ICD-10-CM

## 2023-04-27 PROBLEM — R53.1 DECREASED STRENGTH: Status: ACTIVE | Noted: 2023-04-27

## 2023-04-27 PROBLEM — M25.511 RIGHT SHOULDER PAIN: Status: RESOLVED | Noted: 2019-08-23 | Resolved: 2023-04-27

## 2023-04-27 PROBLEM — M25.60 DECREASED RANGE OF MOTION: Status: RESOLVED | Noted: 2019-08-23 | Resolved: 2023-04-27

## 2023-04-27 PROBLEM — M25.60 DECREASED RANGE OF MOTION: Status: ACTIVE | Noted: 2023-04-27

## 2023-04-27 PROCEDURE — 97110 THERAPEUTIC EXERCISES: CPT

## 2023-04-27 PROCEDURE — 97140 MANUAL THERAPY 1/> REGIONS: CPT

## 2023-04-27 PROCEDURE — 97112 NEUROMUSCULAR REEDUCATION: CPT

## 2023-04-27 NOTE — PROGRESS NOTES
OCCUPATIONAL THERAPY DAILY NOTE    Name: Kilo [facility] Number: [id number]    Therapy Diagnosis:   Encounter Diagnoses   Name Primary?    Decreased  strength of left hand Yes    Numbness and tingling in left hand     Decreased activities of daily living (ADL)      Physician: Kitty Ball MD    Visit Date: 4/27/2023  Physician Orders: Occupational Therapy to Evaluate and Treat  Medical Diagnosis: G56.00 (ICD-10-CM) - Carpal tunnel syndrome, unspecified laterality  Evaluation Date: 4/10/2023  Insurance Authorization Period Expiration: 4/10/2023 - 12/31/2023  Plan of Care Certification Period: 4/10/2023 - 7/10/2023  Progress Note Due: 5/10/2023      Visit # / Visits authorized: 4/24  (5 Episode Visits)  FOTO: 1/3     Surgical Procedure: N/A  Date of Return to MD: N/A     Precautions:  Standard     Time In: 8:00 pm  Time Out: 8:45 pm  Total Appointment Time (timed & untimed codes): 45 minutes       SUBJECTIVE     Today, pt reports: He is still having symptoms occasionally during the day and night. He is going to look into a heelbo.  He/She was compliant with home exercise program.  Response to previous treatment: required verbal cues to perform his exercises correctly/decreased overall symptms  Functional change: weight lifting and sleeping without symptoms    Pre-Treatment Pain: 2/10  Post-Treatment Pain: 0/10  Location: NA      OBJECTIVE     Please see updated objectives at evaluation dated 4/10/2023    Positive tinel's at cubital tunnel on left.    TREATMENT     Kilo received therapeutic exercises to develop strength, endurance, ROM, flexibility, posture, and core stabilization for 25 minutes including:    Exercise 4/27/2023   Tendon glides bilateral hands 10-20x x   Median nerve glides bilateral hands 10x x   Median nerve stretches on wall 5/5 second holds x   Pectoralis stretches supine:  W/T/Y with 1/2 roll x   Thoracic spine stretches on 1/2 roll x           Kilo received the following  manual therapy techniques: Soft tissue Mobilization were applied to the: bilateral upper trapezius muscles for 10 minutes, including:  STM of bilateral upper trapezius, levator scapulae and forearm and distal upper arm and elbow and subscapularis muscle.      Kilo participated in neuromuscular re-education activities to improve: Posture for 10 minutes. The following activities were included:    Exercise 4/27/2023   Side lying external rotation with scapular retraction 2# 20x  bilateral shoulders   Low rows with scapular retractions blue band x   Bilateral external rotation red x   Forearm Stretch  - wrist flexion  - wrist extension x   Ulnar nerve glides  10x x   Ulnar nerve stretches  goggle 10/5 second holds x                 Kilo received the following supervised modalities after being cleared for contradictions:     Kilo received hot pack for 0 minutes to 0.    Kilo received cold pack for 0 minutes.      Home Exercises Provided and Patient Education Provided     Education/Self-Care provided: (5 minutes)  Patient educated on biomechanical justification for therapeutic exercise and importance of compliance with HEP in order to improve overall impairments and QOL   Patient educated on postural awareness during band exercises.  Patient educated on proper ergonomics at the work station in order to maintain optimal alignment of the musculoskeletal system and improve efficiency in the work environment.  Recommended he stop biceps curls or perform isometrics or small arc movement.  Recommended elbow for his left elbow to be worn during the day.    Written Home Exercises Provided: yes.  Exercises were reviewed and Kilo was able to demonstrate them prior to the end of the session.  Kilo demonstrated good  understanding of the education provided.     See EMR under Patient Instructions for exercises provided 4/14/2023.    ASSESSMENT   Pt tolerated manual therapy well with reports of decreased pain and tension in  musculature following intervention. Pt tolerated exercise well with reports of increased fatigue but no increased pain. Pt demonstrated good understanding of exercises and required minimal cueing to maintain proper form.  Patient appears to be improving, would benefit from continued work on postural awareness and activity modification.    Kilo Is progressing well towards his goals.   Pt prognosis is Good.     Pt will continue to benefit from skilled outpatient occupational therapy to address the deficits listed in the problem list box on initial evaluation, provide pt/family education and to maximize pt's level of independence in the home and community environment.     Pt's spiritual, cultural and educational needs considered and pt agreeable to plan of care and goals.     Anticipated barriers to occupational therapy: compliance with home exercise program/attention span with focus on performing his exercises properly.    Goals:   Short Term Goals:  6 weeks  Progress   4/24/2023   Pain: Patient will demonstrate improved pain by reports of less than or equal to 4/10 worst pain on the verbal rating scale in order to progress toward maximal functional ability and improve quality of life. PM   Function: Patient will demonstrate improved function as indicated by a functional limitation score of less than or equal to 22 out of 100 on FOTO. PC   Mobility: Patient will improve AROM to 50% of stated goals, listed in objective measures above, in order to progress towards independence with functional activities.  PM   Strength: Patient will improve strength to 50% of stated goals, listed in objective measures above, in order to progress towards independence with functional activities.  PC   HEP: Patient will demonstrate independence with HEP in order to progress toward functional independence. PM      Long Term Goals:  12 weeks  Progress  4/24/2023   Pain: Patient will demonstrate improved pain by reports of less than or  equal to 2/10 worst pain on the verbal rating scale in order to progress toward maximal functional ability and improve quality of life.   PC   Function: Patient will demonstrate improved function as indicated by a functional limitation score of less than or equal to 21 out of 100 on FOTO. PC   Mobility: Patient will improve AROM to stated goals, listed in objective measures above, in order to return to maximal functional potential and improve quality of life. PC   Strength: Patient will improve strength to stated goals, listed in objective measures above, in order to improve functional independence and quality of life. PC   Patient will return to normal ADL's, IADL's, community involvement, recreational activities, and work-related activities with less than or equal to 2/10 pain and maximal function.  PC      Goals Key:  PC= Progressing/Continue;       PM= Partially Met;       Goal Met= Goal Met      DC= Discontinue       PLAN   Continue Plan of Care (POC) and progress per patient tolerance.    Ladonna Tucker OT, NIKKI

## 2023-05-02 ENCOUNTER — CLINICAL SUPPORT (OUTPATIENT)
Dept: REHABILITATION | Facility: HOSPITAL | Age: 63
End: 2023-05-02
Payer: COMMERCIAL

## 2023-05-02 DIAGNOSIS — M25.60 DECREASED RANGE OF MOTION: Primary | ICD-10-CM

## 2023-05-02 DIAGNOSIS — Z78.9 DECREASED ACTIVITIES OF DAILY LIVING (ADL): ICD-10-CM

## 2023-05-02 DIAGNOSIS — R20.2 NUMBNESS AND TINGLING IN LEFT HAND: ICD-10-CM

## 2023-05-02 DIAGNOSIS — R53.1 DECREASED STRENGTH: ICD-10-CM

## 2023-05-02 DIAGNOSIS — R20.0 NUMBNESS AND TINGLING IN LEFT HAND: ICD-10-CM

## 2023-05-02 DIAGNOSIS — R29.898 DECREASED GRIP STRENGTH OF LEFT HAND: Primary | ICD-10-CM

## 2023-05-02 PROCEDURE — 97112 NEUROMUSCULAR REEDUCATION: CPT

## 2023-05-02 PROCEDURE — 97110 THERAPEUTIC EXERCISES: CPT

## 2023-05-02 PROCEDURE — 97140 MANUAL THERAPY 1/> REGIONS: CPT

## 2023-05-02 NOTE — PROGRESS NOTES
OCCUPATIONAL THERAPY DAILY NOTE    Name: Kilo [facility] Number: [id number]    Therapy Diagnosis:   Encounter Diagnoses   Name Primary?    Decreased  strength of left hand Yes    Numbness and tingling in left hand     Decreased activities of daily living (ADL)      Physician: Kitty Ball MD    Visit Date: 5/2/2023  Physician Orders: Occupational Therapy to Evaluate and Treat  Medical Diagnosis: G56.00 (ICD-10-CM) - Carpal tunnel syndrome, unspecified laterality  Evaluation Date: 4/10/2023  Insurance Authorization Period Expiration: 4/10/2023 - 12/31/2023  Plan of Care Certification Period: 4/10/2023 - 7/10/2023  Progress Note Due: 5/10/2023      Visit # / Visits authorized: 5/24  (6 Episode Visits)  FOTO: 1/3     Surgical Procedure: N/A  Date of Return to MD: N/A     Precautions:  Standard     Time In: 3:15  Time Out: 4:10  Total Appointment Time (timed & untimed codes): 55 minutes       SUBJECTIVE     Today, pt reports: His symptoms are staying consistent, only occasionally at night.   He/She was compliant with home exercise program.  Response to previous treatment: required verbal cues to perform his exercises correctly/decreased overall symptms  Functional change: weight lifting and sleeping without symptoms    Pre-Treatment Pain: 2/10  Post-Treatment Pain: 0/10  Location: NA      OBJECTIVE     Please see updated objectives at evaluation dated 4/10/2023    Positive tinel's at cubital tunnel on left.    TREATMENT     Kilo received therapeutic exercises to develop strength, endurance, ROM, flexibility, posture, and core stabilization for 25 minutes including:    Exercise 5/2/2023   Tendon glides bilateral hands 10-20x x   Median nerve glides bilateral hands 10x x   Median nerve stretches on wall 5/5 second holds x   Pectoralis stretches supine:  W/T/Y with 1/2 roll x   Thoracic spine stretches on 1/2 roll x           Kilo received the following manual therapy techniques: Soft tissue  Mobilization were applied to the: bilateral upper trapezius muscles for 10 minutes, including:  STM of bilateral upper trapezius, levator scapulae and forearm and distal upper arm and elbow and subscapularis muscle.      Kilo participated in neuromuscular re-education activities to improve: Posture for 20 minutes. The following activities were included:    Exercise 5/2/2023   Side lying external rotation with scapular retraction 2# 20x  bilateral shoulders   Low rows with scapular retractions blue band x   Bilateral external rotation red x   Forearm Stretch  - wrist flexion  - wrist extension x   Ulnar nerve glides  10x x   Ulnar nerve stretches  goggle 10/5 second holds x   Doorway stretch 135, 90 and 45 X 3- 30 second holds              Kilo received the following supervised modalities after being cleared for contradictions:     Kilo received hot pack for 0 minutes to 0.    Kilo received cold pack for 0 minutes.      Home Exercises Provided and Patient Education Provided     Education/Self-Care provided: (5 minutes)  Patient educated on biomechanical justification for therapeutic exercise and importance of compliance with HEP in order to improve overall impairments and QOL   Patient educated on postural awareness during band exercises.  Patient educated on proper ergonomics at the work station in order to maintain optimal alignment of the musculoskeletal system and improve efficiency in the work environment.  Recommended he stop biceps curls or perform isometrics or small arc movement.  Recommended elbow for his left elbow to be worn during the day.    Written Home Exercises Provided: yes.  Exercises were reviewed and Kilo was able to demonstrate them prior to the end of the session.  Kilo demonstrated good  understanding of the education provided.     See EMR under Patient Instructions for exercises provided 4/14/2023.    ASSESSMENT   Pt tolerated manual therapy well with reports of decreased pain and  tension in musculature following intervention. Pt tolerated exercise well with reports of increased fatigue but no increased pain. Pt demonstrated good understanding of exercises and required minimal cueing to maintain proper form.  Patient appears to be improving, would benefit from continued work on postural awareness and activity modification.    Kilo Is progressing well towards his goals.   Pt prognosis is Good.     Pt will continue to benefit from skilled outpatient occupational therapy to address the deficits listed in the problem list box on initial evaluation, provide pt/family education and to maximize pt's level of independence in the home and community environment.     Pt's spiritual, cultural and educational needs considered and pt agreeable to plan of care and goals.     Anticipated barriers to occupational therapy: compliance with home exercise program/attention span with focus on performing his exercises properly.    Goals:   Short Term Goals:  6 weeks  Progress   4/24/2023   Pain: Patient will demonstrate improved pain by reports of less than or equal to 4/10 worst pain on the verbal rating scale in order to progress toward maximal functional ability and improve quality of life. PM   Function: Patient will demonstrate improved function as indicated by a functional limitation score of less than or equal to 22 out of 100 on FOTO. PC   Mobility: Patient will improve AROM to 50% of stated goals, listed in objective measures above, in order to progress towards independence with functional activities.  PM   Strength: Patient will improve strength to 50% of stated goals, listed in objective measures above, in order to progress towards independence with functional activities.  PC   HEP: Patient will demonstrate independence with HEP in order to progress toward functional independence. PM      Long Term Goals:  12 weeks  Progress  4/24/2023   Pain: Patient will demonstrate improved pain by reports of less  than or equal to 2/10 worst pain on the verbal rating scale in order to progress toward maximal functional ability and improve quality of life.   PC   Function: Patient will demonstrate improved function as indicated by a functional limitation score of less than or equal to 21 out of 100 on FOTO. PC   Mobility: Patient will improve AROM to stated goals, listed in objective measures above, in order to return to maximal functional potential and improve quality of life. PC   Strength: Patient will improve strength to stated goals, listed in objective measures above, in order to improve functional independence and quality of life. PC   Patient will return to normal ADL's, IADL's, community involvement, recreational activities, and work-related activities with less than or equal to 2/10 pain and maximal function.  PC      Goals Key:  PC= Progressing/Continue;       PM= Partially Met;       Goal Met= Goal Met      DC= Discontinue       PLAN   Continue Plan of Care (POC) and progress per patient tolerance.    Ladonna Tucker OT, NIKKI

## 2023-05-02 NOTE — PROGRESS NOTES
[facility]   Physical Therapy Treatment Note     Name: Kilo [facility] Number: [id number]    Therapy Diagnosis:   Encounter Diagnoses   Name Primary?    Decreased range of motion Yes    Decreased strength      Physician: Kitty Ball MD    Visit Date: 5/2/2023    Physician Orders: PT Eval and Treat  Medical Diagnosis from Referral: Foot pain, bilateral  Evaluation Date: 4/24/2023  Authorization Period Expiration: 5/24/23  Plan of Care Expiration: 6/24/23  Progress Note Due: 5/24/2023  Visit # / Visits authorized: 1/1  FOTO: 1/3 (last performed on 4/24/2023)    PTA Visit #: 0/5     Progress Note Due on 5/24/2023    Time In: 1430  Time Out: 1515  Total Billable Time: 45 minutes    Precautions: Standard    SUBJECTIVE     Pt reports: that he was lifting some things earlier today so he has some low back irritation. Patient reports that his foot pain is related to his low back pain.   He was compliant with home exercise program.  Response to previous treatment: No adverse reactions to evaluation  Functional change: No noted functional change    Pain: 2/10  Location:  left foot>right foot (as well as low back pain)    OBJECTIVE     Objective Measures updated at progress report unless specified.     Treatment     Kilo received the treatments listed below:     MANUAL THERAPY TECHNIQUES were applied for 8 minutes, including:    Manual Intervention Performed Today    Soft Tissue Mobilization x bilateral  thoracic and lumbar paraspinals   Joint Mobilizations     Mobilization with movement          Functional Dry Needling        Plan for Next Visit: PRN     THERAPEUTIC EXERCISES to develop strength, endurance, ROM, flexibility, posture, and core stabilization for (29) minutes including:    Intervention Performed Today    Upright Bike x 5 minutes level 9   Dynamic Stretches x Hamstring Kicks, Hamstring Scoops, Butt Kicks, Single Knee to Chest 2 laps back and forth 1/3 of turf     Quadruped Hip Opener x 2x5 reps    Open Books x 5 second holds x10 reps                          Plan for Next Visit: Thoracic and LB mobility, quadruped openers, dynamic warm up series, downward dog/upward dog, rodrick pose, donkey/hydrants, cook band for hamstring mobility     NEUROMUSCULAR RE-EDUCATION ACTIVITIES to improve Balance, Coordination, Kinesthetic, Sense, Proprioception, and Posture for (8) minutes.  The following were included:    Intervention Performed Today    Sciatic Nerve Glides X  x 3x5 reps supine  1x5 reps seated                                         Plan for Next Visit:        Patient Education and Home Exercises     Home Exercises Provided and Patient Education Provided     Education provided:   - PURPOSE: Patient educated on the impairments noted above and the effects of physical therapy intervention to improve overall condition and QOL.   EXERCISE: Patient was educated on all the above exercise prior/during/after for proper posture, positioning, and execution for safe performance with home exercise program.   STRENGTH: Patient educated on the importance of improved core and extremity strength in order to improve alignment of the spine and extremities with static positions and dynamic movement.   POSTURE: Patient educated on postural awareness to reduce stress and maintain optimal alignment of the spine with static positions and dynamic movement     Written Home Exercises Provided: Patient instructed to cont prior HEP. Exercises were reviewed and Kilo was able to demonstrate them prior to the end of the session.  Kilo demonstrated good  understanding of the education provided. See EMR under Patient Instructions for exercises provided during therapy sessions      ASSESSMENT     Patient tolerated treatment well today with tolerable pain reported intermittently throughout the session with more of dynamic stretches performed due to the strain on the low back. Focus on thoracic/lumbar  mobility with more restrictions noted in thoracic than lumbar spine. Patient with pretty significant tension in hamstrings with neural tension present, which improved significantly with nerve glides and lower extremity dynamic mobility. Patient noted with increased muscle tension in lumbar paraspinals which improved moderately with manual therapy interventions.     Kilo Is progressing well towards his goals.   Pt prognosis is Good, if patient is consistent and compliant with Physical Therapy and home exercise program.    Pt will continue to benefit from skilled outpatient physical therapy to address the deficits listed in the problem list box on initial evaluation, provide pt/family education and to maximize pt's level of independence in the home and community environment.     Pt's spiritual, cultural and educational needs considered and pt agreeable to plan of care and goals.     Anticipated barriers to physical therapy:  co-morbidities    Goals:   Reviewed:4/24/2023    Short Term Goals: In 4 weeks   1.Patient to be educated on HEP.  2.Patient to increase active range of motion of LB  to WFL's, in order to improve available range of motion for ADL's.    3.Patient to increase bilateral hip flexion, IR and ER strength by 1/2 grade, in order to improve endurance and increase ability to perform all functional activities for increased time.   4.Patient to improve score on the FOTO, to improve QOL.        Long Term Goals: In 8 weeks  1.Patient to improve score on the FOTO to 13% or less, to improve QOL.  2. Patient to increase AROM of LB spine to WNL's in order to improve available range of motion for ADL's.  3. Patient to have decreased numbness and tingling in bilateral feet, to improve QOL.  4. Patient to normalize MLT bilateral LE's hamstrings, and posterior calf muscle's in order to increase ability to perform all functional activities.  5. Patient to perform daily activities including sitting in long sitting  without increased symptoms.    PLAN     Continue POC and frequency as planned. Continue to work on mobility and strength.      These services are reasonable and necessary for the conditions set forth above while under my care.    Kelly Lara, PT, DPT

## 2023-05-04 ENCOUNTER — CLINICAL SUPPORT (OUTPATIENT)
Dept: REHABILITATION | Facility: HOSPITAL | Age: 63
End: 2023-05-04
Payer: COMMERCIAL

## 2023-05-04 DIAGNOSIS — R20.2 NUMBNESS AND TINGLING IN LEFT HAND: ICD-10-CM

## 2023-05-04 DIAGNOSIS — Z78.9 DECREASED ACTIVITIES OF DAILY LIVING (ADL): ICD-10-CM

## 2023-05-04 DIAGNOSIS — R29.898 DECREASED GRIP STRENGTH OF LEFT HAND: Primary | ICD-10-CM

## 2023-05-04 DIAGNOSIS — R20.0 NUMBNESS AND TINGLING IN LEFT HAND: ICD-10-CM

## 2023-05-04 PROCEDURE — 97140 MANUAL THERAPY 1/> REGIONS: CPT

## 2023-05-04 PROCEDURE — 97112 NEUROMUSCULAR REEDUCATION: CPT

## 2023-05-04 PROCEDURE — 97110 THERAPEUTIC EXERCISES: CPT

## 2023-05-04 NOTE — PROGRESS NOTES
OCCUPATIONAL THERAPY DAILY NOTE    Name: Kilo Sanford [facility] Number: [id number]    Therapy Diagnosis:   Encounter Diagnoses   Name Primary?    Decreased  strength of left hand Yes    Numbness and tingling in left hand     Decreased activities of daily living (ADL)      Physician: Kitty Ball MD    Visit Date: 5/4/2023  Physician Orders: Occupational Therapy to Evaluate and Treat  Medical Diagnosis: G56.00 (ICD-10-CM) - Carpal tunnel syndrome, unspecified laterality  Evaluation Date: 4/10/2023  Insurance Authorization Period Expiration: 4/10/2023 - 12/31/2023  Plan of Care Certification Period: 4/10/2023 - 7/10/2023  Progress Note Due: 5/10/2023      Visit # / Visits authorized: 6/24  (7 Episode Visits)  FOTO: 1/3     Surgical Procedure: N/A  Date of Return to MD: N/A     Precautions:  Standard     Time In: 8:00  Time Out: 8:55  Total Appointment Time (timed & untimed codes): 55 minutes       SUBJECTIVE     Today, pt reports: His symptoms are a bit better  He/She was compliant with home exercise program.  Response to previous treatment: required verbal cues to perform his exercises correctly/decreased overall symptms  Functional change: weight lifting and sleeping without symptoms    Pre-Treatment Pain: 2/10  Post-Treatment Pain: 0/10  Location: NA      OBJECTIVE     Please see updated objectives at evaluation dated 4/10/2023    Positive tinel's at cubital tunnel on left.    TREATMENT     Kilo received therapeutic exercises to develop strength, endurance, ROM, flexibility, posture, and core stabilization for 25 minutes including:    Exercise 5/4/2023   Tendon glides bilateral hands 10-20x x   Median nerve glides bilateral hands 10x x   Median nerve stretches on wall 5/5 second holds x   Pectoralis stretches supine:  W/T/Y with 1/2 roll x   Thoracic spine stretches on 1/2 roll x           Kilo received the following manual therapy techniques: Soft tissue Mobilization were applied to the: bilateral  upper trapezius muscles for 10 minutes, including:  STM of bilateral upper trapezius, levator scapulae and forearm and distal upper arm and elbow and subscapularis muscle.      Kilo participated in neuromuscular re-education activities to improve: Posture for 20 minutes. The following activities were included:    Exercise 5/4/2023   Side lying external rotation with scapular retraction 2# 20x  bilateral shoulders   Low rows with scapular retractions blue band x   Bilateral external rotation red x   Forearm Stretch  - wrist flexion  - wrist extension x   Ulnar nerve glides  10x x   Ulnar nerve stretches  goggle 10/5 second holds x   Doorway stretch 135, 90 and 45 X 3- 30 second holds    Forearm curls  -flexion  -extension 2lb weight 20x   Digi flex - blue  Supination  Pronation  neutral 20x      Dowel curls with 2 lb dumbbell 20x          Kilo received the following supervised modalities after being cleared for contradictions:     Kilo received hot pack for 0 minutes to 0.    Kilo received cold pack for 0 minutes.      Home Exercises Provided and Patient Education Provided     Education/Self-Care provided: (5 minutes)  Patient educated on biomechanical justification for therapeutic exercise and importance of compliance with HEP in order to improve overall impairments and QOL   Patient educated on postural awareness during band exercises.  Patient educated on proper ergonomics at the work station in order to maintain optimal alignment of the musculoskeletal system and improve efficiency in the work environment.  Recommended he stop biceps curls or perform isometrics or small arc movement.  Recommended elbow for his left elbow to be worn during the day.    Written Home Exercises Provided: yes.  Exercises were reviewed and Kilo was able to demonstrate them prior to the end of the session.  Kilo demonstrated good  understanding of the education provided.     See EMR under Patient Instructions for exercises  provided 4/14/2023.    ASSESSMENT   Pt tolerated manual therapy well with reports of decreased pain and tension in musculature following intervention. Pt tolerated exercise well with reports of increased fatigue but no increased pain. Pt demonstrated good understanding of exercises and required minimal cueing to maintain proper form.  Patient appears to be improving, would benefit from continued work on postural awareness and activity modification.    Kilo Is progressing well towards his goals.   Pt prognosis is Good.     Pt will continue to benefit from skilled outpatient occupational therapy to address the deficits listed in the problem list box on initial evaluation, provide pt/family education and to maximize pt's level of independence in the home and community environment.     Pt's spiritual, cultural and educational needs considered and pt agreeable to plan of care and goals.     Anticipated barriers to occupational therapy: compliance with home exercise program/attention span with focus on performing his exercises properly.    Goals:   Short Term Goals:  6 weeks  Progress   4/24/2023   Pain: Patient will demonstrate improved pain by reports of less than or equal to 4/10 worst pain on the verbal rating scale in order to progress toward maximal functional ability and improve quality of life. PM   Function: Patient will demonstrate improved function as indicated by a functional limitation score of less than or equal to 22 out of 100 on FOTO. PC   Mobility: Patient will improve AROM to 50% of stated goals, listed in objective measures above, in order to progress towards independence with functional activities.  PM   Strength: Patient will improve strength to 50% of stated goals, listed in objective measures above, in order to progress towards independence with functional activities.  PC   HEP: Patient will demonstrate independence with HEP in order to progress toward functional independence. PM      Long Term  Goals:  12 weeks  Progress  4/24/2023   Pain: Patient will demonstrate improved pain by reports of less than or equal to 2/10 worst pain on the verbal rating scale in order to progress toward maximal functional ability and improve quality of life.   PC   Function: Patient will demonstrate improved function as indicated by a functional limitation score of less than or equal to 21 out of 100 on FOTO. PC   Mobility: Patient will improve AROM to stated goals, listed in objective measures above, in order to return to maximal functional potential and improve quality of life. PC   Strength: Patient will improve strength to stated goals, listed in objective measures above, in order to improve functional independence and quality of life. PC   Patient will return to normal ADL's, IADL's, community involvement, recreational activities, and work-related activities with less than or equal to 2/10 pain and maximal function.  PC      Goals Key:  PC= Progressing/Continue;       PM= Partially Met;       Goal Met= Goal Met      DC= Discontinue       PLAN   Continue Plan of Care (POC) and progress per patient tolerance.    Ladonna Tucker OT, NIKKI

## 2023-05-08 ENCOUNTER — CLINICAL SUPPORT (OUTPATIENT)
Dept: REHABILITATION | Facility: HOSPITAL | Age: 63
End: 2023-05-08
Payer: COMMERCIAL

## 2023-05-08 DIAGNOSIS — Z78.9 DECREASED ACTIVITIES OF DAILY LIVING (ADL): ICD-10-CM

## 2023-05-08 DIAGNOSIS — R20.2 NUMBNESS AND TINGLING IN LEFT HAND: ICD-10-CM

## 2023-05-08 DIAGNOSIS — R20.0 NUMBNESS AND TINGLING IN LEFT HAND: ICD-10-CM

## 2023-05-08 DIAGNOSIS — R29.898 DECREASED GRIP STRENGTH OF LEFT HAND: Primary | ICD-10-CM

## 2023-05-08 PROCEDURE — 97140 MANUAL THERAPY 1/> REGIONS: CPT | Performed by: OCCUPATIONAL THERAPIST

## 2023-05-08 PROCEDURE — 97112 NEUROMUSCULAR REEDUCATION: CPT | Performed by: OCCUPATIONAL THERAPIST

## 2023-05-08 PROCEDURE — 97110 THERAPEUTIC EXERCISES: CPT | Performed by: OCCUPATIONAL THERAPIST

## 2023-05-08 NOTE — PROGRESS NOTES
OCCUPATIONAL THERAPY DAILY NOTE    Name: Kilo Sanford [facility] Number: [id number]    Therapy Diagnosis:   Encounter Diagnoses   Name Primary?    Decreased  strength of left hand Yes    Numbness and tingling in left hand     Decreased activities of daily living (ADL)      Physician: Kitty Ball MD    Visit Date: 5/8/2023  Physician Orders: Occupational Therapy to Evaluate and Treat  Medical Diagnosis: G56.00 (ICD-10-CM) - Carpal tunnel syndrome, unspecified laterality  Evaluation Date: 4/10/2023  Insurance Authorization Period Expiration: 4/10/2023 - 12/31/2023  Plan of Care Certification Period: 4/10/2023 - 7/10/2023  Progress Note Due: 5/10/2023      Visit # / Visits authorized: 7/24  (8 Episode Visits)  FOTO: 1/3     Surgical Procedure: N/A  Date of Return to MD: N/A     Precautions:  Standard     Time In: 8:00  Time Out: 8:50  Total Appointment Time (timed & untimed codes): 50 minutes       SUBJECTIVE     Today, pt reports: no discomfort. He is working out at the gym.  He/She was compliant with home exercise program.  Response to previous treatment: required minimal verbal cues to perform his exercises correctly  Functional change: weight lifting     Pre-Treatment Pain: 0/10  Post-Treatment Pain: 0/10  Location: NA      OBJECTIVE     Please see updated objectives at evaluation dated 4/10/2023    Positive tinel's at cubital tunnel on left.    TREATMENT     Kilo received therapeutic exercises to develop strength, endurance, ROM, flexibility, posture, and core stabilization for 25 minutes including:    Exercise 5/8/2023   Tendon glides bilateral hands 10-20x    Median nerve glides bilateral hands 10x    Median  nerve stretches on wall 5/5 second holds x   Pectoralis stretches supine:  W/T/Y with blue roll x   Thoracic spine stretches on blue roll x   Prone rows and shoulder extension 2-3# 2/15 repetitions x   Ulnar nerve stretch: 5/5 second holds  Supine goggle position  On wall x       Kilo  received the following manual therapy techniques: Soft tissue Mobilization were applied to the: bilateral upper trapezius muscles for 10 minutes, including:  STM of  left forearm muscles.      Kilo participated in neuromuscular re-education activities to improve: Posture for 15 minutes. The following activities were included:    Exercise 5/8/2023   Side lying external rotation with scapular retraction 2# 20x  bilateral shoulders   Low rows with scapular retractions blue band x   Bilateral external rotation red x   Forearm Stretch  - wrist flexion  - wrist extension x   Ulnar nerve glides  10x x   Ulnar nerve stretches  goggle 10/5 second holds x   Doorway stretch 135, 90 and 45 X 3- 30 second holds    Forearm curls  -reverse curls  -extension 2lb weight 20x   Digi flex - blue  Supination  Pronation  neutral 20x      Dowel curls with 2 lb dumbbell          Kilo received the following supervised modalities after being cleared for contradictions:     Kilo received hot pack for 0 minutes to 0.    Kilo received cold pack for 0 minutes.      Home Exercises Provided and Patient Education Provided     Education/Self-Care provided: (5 minutes)  Patient educated on biomechanical justification for therapeutic exercise and importance of compliance with HEP in order to improve overall impairments and QOL   Patient educated on postural awareness during band exercises.  Patient educated on proper ergonomics at the work station in order to maintain optimal alignment of the musculoskeletal system and improve efficiency in the work environment.  Recommended nerve glides prior to nerve stretches for warm up    Written Home Exercises Provided: yes.  Exercises were reviewed and Kilo was able to demonstrate them prior to the end of the session.  Kilo demonstrated good  understanding of the education provided.     See EMR under Patient Instructions for exercises provided 4/14/2023.    ASSESSMENT   Pt tolerated manual therapy  well with reports of decreased pain and tension in musculature following intervention. Pt tolerated exercise well with reports of increased fatigue but no increased pain. Pt demonstrated good understanding of exercises and required minimal cueing to maintain proper form.  Patient appears to be improving with strengthening exercises and scapular control.    Kilo Is progressing well towards his goals.   Pt prognosis is Good.     Pt will continue to benefit from skilled outpatient occupational therapy to address the deficits listed in the problem list box on initial evaluation, provide pt/family education and to maximize pt's level of independence in the home and community environment.     Pt's spiritual, cultural and educational needs considered and pt agreeable to plan of care and goals.     Anticipated barriers to occupational therapy: compliance with home exercise program/attention span with focus on performing his exercises properly.    Goals:   Short Term Goals:  6 weeks  Progress   5/8/2023   Pain: Patient will demonstrate improved pain by reports of less than or equal to 4/10 worst pain on the verbal rating scale in order to progress toward maximal functional ability and improve quality of life. GM   Function: Patient will demonstrate improved function as indicated by a functional limitation score of less than or equal to 22 out of 100 on FOTO. PC   Mobility: Patient will improve AROM to 50% of stated goals, listed in objective measures above, in order to progress towards independence with functional activities.  GM   Strength: Patient will improve strength to 50% of stated goals, listed in objective measures above, in order to progress towards independence with functional activities.  PM   HEP: Patient will demonstrate independence with HEP in order to progress toward functional independence. PM      Long Term Goals:  12 weeks  Progress  5/8/2023   Pain: Patient will demonstrate improved pain by reports of  less than or equal to 2/10 worst pain on the verbal rating scale in order to progress toward maximal functional ability and improve quality of life.   PC   Function: Patient will demonstrate improved function as indicated by a functional limitation score of less than or equal to 21 out of 100 on FOTO. PC   Mobility: Patient will improve AROM to stated goals, listed in objective measures above, in order to return to maximal functional potential and improve quality of life. PC   Strength: Patient will improve strength to stated goals, listed in objective measures above, in order to improve functional independence and quality of life. PC   Patient will return to normal ADL's, IADL's, community involvement, recreational activities, and work-related activities with less than or equal to 2/10 pain and maximal function.  PC      Goals Key:  PC= Progressing/Continue;       PM= Partially Met;       Goal Met= Goal Met      DC= Discontinue       PLAN   Continue Plan of Care (POC) and progress per patient tolerance.    GHULAM Saucedo, NIKKI

## 2023-05-10 ENCOUNTER — CLINICAL SUPPORT (OUTPATIENT)
Dept: REHABILITATION | Facility: HOSPITAL | Age: 63
End: 2023-05-10
Payer: COMMERCIAL

## 2023-05-10 DIAGNOSIS — M25.60 DECREASED RANGE OF MOTION: Primary | ICD-10-CM

## 2023-05-10 DIAGNOSIS — R53.1 DECREASED STRENGTH: ICD-10-CM

## 2023-05-10 PROCEDURE — 97112 NEUROMUSCULAR REEDUCATION: CPT

## 2023-05-10 PROCEDURE — 97110 THERAPEUTIC EXERCISES: CPT

## 2023-05-10 NOTE — PROGRESS NOTES
[facility]   Physical Therapy Treatment Note     Name: Kilo Sanford [facility] Number: [id number]    Therapy Diagnosis:   Encounter Diagnoses   Name Primary?    Decreased range of motion Yes    Decreased strength      Physician: Kitty Ball MD    Visit Date: 5/10/2023    Physician Orders: PT Eval and Treat  Medical Diagnosis from Referral: Foot pain, bilateral  Evaluation Date: 4/24/2023  Authorization Period Expiration: 5/24/23  Plan of Care Expiration: 6/24/23  Progress Note Due: 5/24/2023  Visit # / Visits authorized: 1/1  FOTO: 1/3 (last performed on 4/24/2023)    PTA Visit #: 0/5     Progress Note Due on 5/24/2023    Time In: 7:45 am  Time Out: 8:39 am  Total Billable Time: 49 minutes    Precautions: Standard    SUBJECTIVE     Pt reports: He is feeling about the same, we discussed positioning with sitting at end of day to avoid stress on lower back and pain in foot.  He was encouraged to continue with dynamic exercise's prior to work out at gym and also discussed ways to rotate upper back/lower back in standing.     He was compliant with home exercise program.  Response to previous treatment: No adverse reactions to evaluation  Functional change: No noted functional change    Pain: 2/10  Location:  left foot>right foot (as well as low back pain)    OBJECTIVE     Objective Measures updated at progress report unless specified.     Treatment     Kilo received the treatments listed below:     MANUAL THERAPY TECHNIQUES were applied for 5 minutes, including:    Manual Intervention Performed Today    Soft Tissue Mobilization x Bilateral LB paraspinals and clearing over right iliac crest   Joint Mobilizations x Thoracic spine and LB spine assessed - LB spine limited in mobility bilateral rotation throughout   Mobilization with movement          Functional Dry Needling        Plan for Next Visit: PRN     THERAPEUTIC EXERCISES to develop strength, endurance, ROM, flexibility, posture,  and core stabilization for (30) minutes including:    Intervention Performed Today    Upright Bike x 8 minutes level 9   Dynamic Stretches x Hamstring Kicks, Hamstring Scoops, Butt Kicks, Single Knee to Chest 2 laps back and forth 1/3 of turf    Sidelying hip series  x  x  x Hip abduction 10x  Circles CW/CCW 10x  Bicycle 10x FW/BW   Quadruped donkey kicks x  10x pulses at top of ROM, 2 sets verbal and tactile cues   Standing pigeon x 15s/ 3x   Kneeling hero  x 15s/ 3x                 NEUROMUSCULAR RE-EDUCATION ACTIVITIES to improve Balance, Coordination, Kinesthetic, Sense, Proprioception, and Posture for (14) minutes.  The following were included:    Intervention Performed Today    Sciatic Nerve Glides -  x 3x5 reps supine  3 sets x 5 ankle pumps seated    Quadruped reach backs/ ER  x 10x/ each side   Child's pose x 15s x 5   Quadruped Hip Opener x 2x5 reps ([place] of hip) between set's rodrick pose   Open Books x 10x each side with deep breath in and out at end of open   Downward dog <>upward dog x 10x each, bias downward with 15 sec hold upward                  Plan for Next Visit:  downward dog/upward dog, donkey/hydrants, cook band for hamstring mobility       Patient Education and Home Exercises     Home Exercises Provided and Patient Education Provided     Education provided:   - PURPOSE: Patient educated on the impairments noted above and the effects of physical therapy intervention to improve overall condition and QOL.   EXERCISE: Patient was educated on all the above exercise prior/during/after for proper posture, positioning, and execution for safe performance with home exercise program.   STRENGTH: Patient educated on the importance of improved core and extremity strength in order to improve alignment of the spine and extremities with static positions and dynamic movement.   POSTURE: Patient educated on postural awareness to reduce stress and maintain optimal alignment of the spine with static positions  and dynamic movement     Written Home Exercises Provided: Patient instructed to cont prior HEP. Exercises were reviewed and Kilo was able to demonstrate them prior to the end of the session.  Kilo demonstrated good  understanding of the education provided. See EMR under Patient Instructions for exercises provided during therapy sessions      ASSESSMENT     Patient tolerated treatment well today and was able to increase sitting knee extension at end of treatment with improved mobility and gait pattern. Patient required visual demonstration and verbal/tactile cues to complete all interventions.  Encouraged home exercise program.    Kilo Is progressing well towards his goals.   Pt prognosis is Good, if patient is consistent and compliant with Physical Therapy and home exercise program.    Pt will continue to benefit from skilled outpatient physical therapy to address the deficits listed in the problem list box on initial evaluation, provide pt/family education and to maximize pt's level of independence in the home and community environment.     Pt's spiritual, cultural and educational needs considered and pt agreeable to plan of care and goals.     Anticipated barriers to physical therapy:  co-morbidities    Goals:   Reviewed:5/10/2023   Short Term Goals: In 4 weeks   1.Patient to be educated on HEP.  2.Patient to increase active range of motion of LB  to WFL's, in order to improve available range of motion for ADL's.    3.Patient to increase bilateral hip flexion, IR and ER strength by 1/2 grade, in order to improve endurance and increase ability to perform all functional activities for increased time.   4.Patient to improve score on the FOTO, to improve QOL.        Long Term Goals: In 8 weeks  1.Patient to improve score on the FOTO to 13% or less, to improve QOL.  2. Patient to increase AROM of LB spine to WNL's in order to improve available range of motion for ADL's.  3. Patient to have decreased numbness and  tingling in bilateral feet, to improve QOL.  4. Patient to normalize MLT bilateral LE's hamstrings, and posterior calf muscle's in order to increase ability to perform all functional activities.  5. Patient to perform daily activities including sitting in long sitting without increased symptoms.    PLAN   Outpatient Physical Therapy 1 times weekly for 8 weeks to include any combination of the following interventions: Aquatic Therapy, electrical stimulation (PRN), Manual Therapy, Neuromuscular Re-ed, Patient Education/Self Care, Therapeutic Activites, Therapeutic Exercise, Dry Needling, and Moist Hot Pack/Cold Pack    Monitor response to today's treatment session and progress as indicated.      Melissa Loo, PT

## 2023-05-16 ENCOUNTER — CLINICAL SUPPORT (OUTPATIENT)
Dept: REHABILITATION | Facility: HOSPITAL | Age: 63
End: 2023-05-16
Payer: COMMERCIAL

## 2023-05-16 DIAGNOSIS — R20.0 NUMBNESS AND TINGLING IN LEFT HAND: ICD-10-CM

## 2023-05-16 DIAGNOSIS — M25.60 DECREASED RANGE OF MOTION: Primary | ICD-10-CM

## 2023-05-16 DIAGNOSIS — R20.2 NUMBNESS AND TINGLING IN LEFT HAND: ICD-10-CM

## 2023-05-16 DIAGNOSIS — Z78.9 DECREASED ACTIVITIES OF DAILY LIVING (ADL): ICD-10-CM

## 2023-05-16 DIAGNOSIS — R53.1 DECREASED STRENGTH: ICD-10-CM

## 2023-05-16 DIAGNOSIS — R29.898 DECREASED GRIP STRENGTH OF LEFT HAND: Primary | ICD-10-CM

## 2023-05-16 PROCEDURE — 97140 MANUAL THERAPY 1/> REGIONS: CPT

## 2023-05-16 PROCEDURE — 97112 NEUROMUSCULAR REEDUCATION: CPT

## 2023-05-16 PROCEDURE — 97110 THERAPEUTIC EXERCISES: CPT

## 2023-05-16 NOTE — PROGRESS NOTES
[facility]   Physical Therapy Treatment Note     Name: Kilo Dailey  [facility] Number: [id number]    Therapy Diagnosis:   Encounter Diagnoses   Name Primary?    Decreased range of motion Yes    Decreased strength      Physician: Kitty Ball MD    Visit Date: 5/16/2023    Physician Orders: PT Eval and Treat  Medical Diagnosis from Referral: Foot pain, bilateral  Evaluation Date: 4/24/2023  Authorization Period Expiration: 12/31/23  Plan of Care Expiration: 6/24/23  Progress Note Due: 5/24/2023  Visit # / Visits authorized: 3/25 (+1)  FOTO: 1/3 (last performed on 4/24/2023)    PTA Visit #: 0/5     Progress Note Due on 5/24/2023    Time In: 8:50 am  Time Out: 9:35 am  Total Billable Time: 45 minutes    Precautions: Standard    SUBJECTIVE     Pt reports: He is having a little bit better numbness with sitting.  He has started having some foot pain around his big toe - which he has had off and on for a number of years.  Not sure what flared it up.  He has been taking some ibuprofen which has helped.  Feels lie it is unrelated to his numbness in his foot.     He was compliant with home exercise program.  Response to previous treatment: No adverse reactions to evaluation  Functional change: No noted functional change    Pain: 0/10  Location:  left foot>right foot (as well as low back pain)    OBJECTIVE     Objective Measures updated at progress report unless specified.     Treatment     Pelon received the treatments listed below:     MANUAL THERAPY TECHNIQUES were applied for 5 minutes, including:    Manual Intervention Performed Today    Soft Tissue Mobilization x Bilateral LB paraspinals and clearing over right iliac crest, bilateral subscapularis and latissimus dorsi releases   Joint Mobilizations  Thoracic spine and LB spine assessed - LB spine limited in mobility bilateral rotation throughout   Mobilization with movement          Functional Dry Needling        Plan for Next Visit: PRN      THERAPEUTIC EXERCISES to develop strength, endurance, ROM, flexibility, posture, and core stabilization for (26) minutes including:    Intervention Performed Today    Upright Bike x 10 minutes level 9   Dynamic Stretches x Hamstring Kicks, Hamstring Scoops, Butt Kicks, Single Knee to Chest 2 laps back and forth 1/3 of turf    Sidelying hip series  x  x  x Hip abduction 10x  Circles CW/CCW 10x  Bicycle 10x FW/BW   Quadruped donkey kicks x  12x, 10x pulses at top of ROM   Standing pigeon x 15s/ 5x   Kneeling hero  x 15s/ 5x   Quadruped hydrants x 10x/ 2 sets each side            NEUROMUSCULAR RE-EDUCATION ACTIVITIES to improve Balance, Coordination, Kinesthetic, Sense, Proprioception, and Posture for (14) minutes.  The following were included:    Intervention Performed Today    Sciatic Nerve Glides -  x 3x5 reps supine  3 sets x 5 ankle pumps seated    Quadruped reach backs/ ER  x 10x/ each side   Child's pose x 15s x 5   Quadruped Hip Opener x 2x5 reps ([place] of hip)   Open Books x 10x each side with deep breath in and out at end of open   Downward dog <>upward dog x 10x each, bias downward with 15 sec hold upward    Seated latissimus dorsi stretch x At tall mat 5x, 15 sec holds            Plan for Next Visit:  downward dog/upward dog, donkey/hydrants, cook band for hamstring mobility       Patient Education and Home Exercises     Home Exercises Provided and Patient Education Provided     Education provided:   - PURPOSE: Patient educated on the impairments noted above and the effects of physical therapy intervention to improve overall condition and QOL.   EXERCISE: Patient was educated on all the above exercise prior/during/after for proper posture, positioning, and execution for safe performance with home exercise program.   STRENGTH: Patient educated on the importance of improved core and extremity strength in order to improve alignment of the spine and extremities with static positions and dynamic movement.    POSTURE: Patient educated on postural awareness to reduce stress and maintain optimal alignment of the spine with static positions and dynamic movement     Written Home Exercises Provided: Patient instructed to cont prior HEP. Exercises were reviewed and Pelon was able to demonstrate them prior to the end of the session.  Pelon demonstrated good  understanding of the education provided. See EMR under Patient Instructions for exercises provided during therapy sessions      ASSESSMENT     Patient tolerated all interventions well and is reporting less numbness with long sit posture.  Encouraged home exercise program for mobility and strengthening.    Pelon Is progressing well towards his goals.   Pt prognosis is Good, if patient is consistent and compliant with Physical Therapy and home exercise program.    Pt will continue to benefit from skilled outpatient physical therapy to address the deficits listed in the problem list box on initial evaluation, provide pt/family education and to maximize pt's level of independence in the home and community environment.     Pt's spiritual, cultural and educational needs considered and pt agreeable to plan of care and goals.     Anticipated barriers to physical therapy:  co-morbidities    Goals:   Reviewed:5/16/2023   Short Term Goals: In 4 weeks   1.Patient to be educated on HEP.  2.Patient to increase active range of motion of LB  to WFL's, in order to improve available range of motion for ADL's.    3.Patient to increase bilateral hip flexion, IR and ER strength by 1/2 grade, in order to improve endurance and increase ability to perform all functional activities for increased time.   4.Patient to improve score on the FOTO, to improve QOL.        Long Term Goals: In 8 weeks  1.Patient to improve score on the FOTO to 13% or less, to improve QOL.  2. Patient to increase AROM of LB spine to WNL's in order to improve available range of motion for ADL's.  3. Patient to have  decreased numbness and tingling in bilateral feet, to improve QOL.  4. Patient to normalize MLT bilateral LE's hamstrings, and posterior calf muscle's in order to increase ability to perform all functional activities.  5. Patient to perform daily activities including sitting in long sitting without increased symptoms.    PLAN   Outpatient Physical Therapy 1 times weekly for 8 weeks to include any combination of the following interventions: Aquatic Therapy, electrical stimulation (PRN), Manual Therapy, Neuromuscular Re-ed, Patient Education/Self Care, Therapeutic Activites, Therapeutic Exercise, Dry Needling, and Moist Hot Pack/Cold Pack    Monitor response to today's treatment session and progress as indicated.      Melissa Loo, PT

## 2023-05-16 NOTE — PROGRESS NOTES
OCCUPATIONAL THERAPY Progress and Treatment Note    Name: Kilo Dailey  [facility] Number: [id number]    Therapy Diagnosis:   Encounter Diagnoses   Name Primary?    Decreased  strength of left hand Yes    Numbness and tingling in left hand     Decreased activities of daily living (ADL)      Physician: Kitty Ball MD    Visit Date: 5/16/2023  Physician Orders: Occupational Therapy to Evaluate and Treat  Medical Diagnosis: G56.00 (ICD-10-CM) - Carpal tunnel syndrome, unspecified laterality  Evaluation Date: 4/10/2023  Insurance Authorization Period Expiration: 4/10/2023 - 12/31/2023  Plan of Care Certification Period: 4/10/2023 - 7/10/2023  Progress Note Due: 6/10/2023      Visit # / Visits authorized: 8/24  (9 Episode Visits)  FOTO: 1/3     Surgical Procedure: N/A  Date of Return to MD: N/A     Precautions:  Standard     Time In: 8:00  Time Out: 8:50  Total Appointment Time (timed & untimed codes): 50 minutes       SUBJECTIVE     Today, pt reports: no discomfort. He is working out at the gym.  He/She was compliant with home exercise program.  Response to previous treatment: required minimal verbal cues to perform his exercises correctly  Functional change: weight lifting     Pre-Treatment Pain: 0/10  Post-Treatment Pain: 0/10  Location: NA      OBJECTIVE        Active Range of Motion:    Joint Evaluation  AROM    AROM    Pain/Dysfunction with movement Pain Goal     Left Right        Shoulder Flexion 0-180 Within normal limits  Within normal limits         Shoulder Abduction 0-180          Shoulder External Rotation 0-90          Shoulder Internal Rotation 0-90          Shoulder Extension 0-60          Shoulder Horizontal Adduction 0-90          Elbow Flexion 0-150   Increases symptoms  Symptoms decreased to 1/10 No symptoms    Elbow Extension 0   Increases symptoms  Symptoms decreased to 1/10 No symptoms    Wrist Flexion 0-80          Wrist Extension 0-70          Wrist Supination 0-80          Wrist  Pronation 0-80          Wrist Ulnar Deviation 0-30          Wrist Radial Deviation 0-20                 Manual Muscles Test:  Strength Left Left Right Right Goal       5/16/2023 5/16/2023     Shoulder Flexion 5/5  5/5      Shoulder Abduction         Shoulder Internal Rotation         Shoulder External Rotation         Elbow Flexion         Elbow Extension         Pronation 4+/5 4+/5 4+/5 5/5 5/5   Supination  4+/5      Radial Deviation  4+/5      Ulnar Deviation  4+/5      Wrist Flexion  5/5      Wrist Extension  5/5             and Pinch Strength (in pounds, psi's):    Left Right Right Goal     4/10/2023 4/10/2023 5/16/2023 Left    II 90 95      Lateral 19 25 22 23   Tripod 19 19      Tip 12 12             TREATMENT     Kilo received therapeutic exercises to develop strength, endurance, ROM, flexibility, posture, and core stabilization for 25 minutes including:    Exercise 5/16/2023   Tendon glides bilateral hands 10-20x    Median nerve glides bilateral hands 10x    Median  nerve stretches on wall 5/5 second holds x   Pectoralis stretches supine:  W/T/Y with blue roll x   Thoracic spine stretches on blue roll x   Prone rows and shoulder extension 2-3# 2/15 repetitions x   Ulnar nerve stretch: 5/5 second holds  Supine goggle position  On wall x       Kilo received the following manual therapy techniques: Soft tissue Mobilization were applied to the: bilateral upper trapezius muscles for 10 minutes, including:  STM of  left forearm muscles.      Kilo participated in neuromuscular re-education activities to improve: Posture for 15 minutes. The following activities were included:    Exercise 5/16/2023   Side lying external rotation with scapular retraction 2# 20x  bilateral shoulders   Low rows with scapular retractions blue band x   Bilateral external rotation red x   Forearm Stretch  - wrist flexion  - wrist extension x   Ulnar nerve glides  10x x   Ulnar nerve stretches  goggle 10/5 second holds x    Doorway stretch 135, 90 and 45 X 3- 30 second holds    Forearm curls  -reverse curls  -extension 2lb weight 20x   Digi flex - blue  Supination  Pronation  neutral 20x      Dowel curls with 2 lb dumbbell          Kilo received the following supervised modalities after being cleared for contradictions:     Kilo received hot pack for 0 minutes to 0.    Kilo received cold pack for 0 minutes.      Home Exercises Provided and Patient Education Provided     Education/Self-Care provided: (5 minutes)  Patient educated on biomechanical justification for therapeutic exercise and importance of compliance with HEP in order to improve overall impairments and QOL   Patient educated on postural awareness during band exercises.  Patient educated on proper ergonomics at the work station in order to maintain optimal alignment of the musculoskeletal system and improve efficiency in the work environment.  Recommended nerve glides prior to nerve stretches for warm up    Written Home Exercises Provided: yes.  Exercises were reviewed and Kilo was able to demonstrate them prior to the end of the session.  Kilo demonstrated good  understanding of the education provided.     See EMR under Patient Instructions for exercises provided 4/14/2023.    ASSESSMENT     Pt tolerated manual therapy well with reports of decreased pain and tension in musculature following intervention. Pt tolerated exercise well with reports of increased fatigue but no increased pain. Pt demonstrated good understanding of exercises and required minimal cueing to maintain proper form.  Patient appears to be improving with strengthening exercises and scapular control.    Kilo Is progressing well towards his goals.   Pt prognosis is Good.     Pt will continue to benefit from skilled outpatient occupational therapy to address the deficits listed in the problem list box on initial evaluation, provide pt/family education and to maximize pt's level of independence  in the home and community environment.     Pt's spiritual, cultural and educational needs considered and pt agreeable to plan of care and goals.     Anticipated barriers to occupational therapy: compliance with home exercise program/attention span with focus on performing his exercises properly.    Goals:   Short Term Goals:  6 weeks  Progress   5/8/2023   Pain: Patient will demonstrate improved pain by reports of less than or equal to 4/10 worst pain on the verbal rating scale in order to progress toward maximal functional ability and improve quality of life. GM   Function: Patient will demonstrate improved function as indicated by a functional limitation score of less than or equal to 22 out of 100 on FOTO. PC   Mobility: Patient will improve AROM to 50% of stated goals, listed in objective measures above, in order to progress towards independence with functional activities.  GM   Strength: Patient will improve strength to 50% of stated goals, listed in objective measures above, in order to progress towards independence with functional activities.  GM   HEP: Patient will demonstrate independence with HEP in order to progress toward functional independence. PM      Long Term Goals:  12 weeks  Progress  5/8/2023   Pain: Patient will demonstrate improved pain by reports of less than or equal to 2/10 worst pain on the verbal rating scale in order to progress toward maximal functional ability and improve quality of life.   PC   Function: Patient will demonstrate improved function as indicated by a functional limitation score of less than or equal to 21 out of 100 on FOTO. PC   Mobility: Patient will improve AROM to stated goals, listed in objective measures above, in order to return to maximal functional potential and improve quality of life. PC   Strength: Patient will improve strength to stated goals, listed in objective measures above, in order to improve functional independence and quality of life. PC   Patient  will return to normal ADL's, IADL's, community involvement, recreational activities, and work-related activities with less than or equal to 2/10 pain and maximal function.  PC      Goals Key:  PC= Progressing/Continue;       PM= Partially Met;       Goal Met= Goal Met      DC= Discontinue       PLAN   Continue Plan of Care (POC) and progress per patient tolerance.    Ladonna Tucker, OT, NIKKI

## 2023-05-17 ENCOUNTER — PATIENT MESSAGE (OUTPATIENT)
Dept: OPHTHALMOLOGY | Facility: CLINIC | Age: 63
End: 2023-05-17
Payer: COMMERCIAL

## 2023-05-18 ENCOUNTER — PATIENT MESSAGE (OUTPATIENT)
Dept: REHABILITATION | Facility: HOSPITAL | Age: 63
End: 2023-05-18

## 2023-05-18 ENCOUNTER — TELEPHONE (OUTPATIENT)
Dept: PRIMARY CARE CLINIC | Facility: CLINIC | Age: 63
End: 2023-05-18
Payer: COMMERCIAL

## 2023-05-18 ENCOUNTER — CLINICAL SUPPORT (OUTPATIENT)
Dept: REHABILITATION | Facility: HOSPITAL | Age: 63
End: 2023-05-18
Payer: COMMERCIAL

## 2023-05-18 DIAGNOSIS — R20.0 NUMBNESS AND TINGLING IN LEFT HAND: Primary | ICD-10-CM

## 2023-05-18 DIAGNOSIS — R20.0 NUMBNESS AND TINGLING IN LEFT HAND: ICD-10-CM

## 2023-05-18 DIAGNOSIS — G56.20 CUBITAL TUNNEL SYNDROME, UNSPECIFIED LATERALITY: ICD-10-CM

## 2023-05-18 DIAGNOSIS — Z78.9 DECREASED ACTIVITIES OF DAILY LIVING (ADL): ICD-10-CM

## 2023-05-18 DIAGNOSIS — R20.2 NUMBNESS AND TINGLING IN LEFT HAND: ICD-10-CM

## 2023-05-18 DIAGNOSIS — R29.898 DECREASED GRIP STRENGTH OF LEFT HAND: Primary | ICD-10-CM

## 2023-05-18 DIAGNOSIS — R20.2 NUMBNESS AND TINGLING IN LEFT HAND: Primary | ICD-10-CM

## 2023-05-18 PROCEDURE — 97140 MANUAL THERAPY 1/> REGIONS: CPT

## 2023-05-18 PROCEDURE — 97110 THERAPEUTIC EXERCISES: CPT

## 2023-05-18 PROCEDURE — 97112 NEUROMUSCULAR REEDUCATION: CPT

## 2023-05-18 NOTE — TELEPHONE ENCOUNTER
I have signed for the following orders AND/OR meds.  Please call the patient and ask the patient to schedule the testing AND/OR inform about any medications that were sent.      Orders Placed This Encounter   Procedures    X-Ray Elbow Complete Left     Standing Status:   Future     Standing Expiration Date:   5/18/2024     Order Specific Question:   May the Radiologist modify the order per protocol to meet the clinical needs of the patient?     Answer:   Yes     Order Specific Question:   Release to patient     Answer:   Immediate    Ambulatory referral/consult to Orthopedics     Standing Status:   Future     Standing Expiration Date:   6/18/2024     Referral Priority:   Routine     Referral Type:   Consultation     Requested Specialty:   Orthopedic Surgery     Number of Visits Requested:   1

## 2023-05-18 NOTE — TELEPHONE ENCOUNTER
----- Message from Ladonna Tucker OT sent at 5/18/2023  8:25 AM CDT -----  Regarding: Orthopedic referral  Hi Dr. Ball,    I have been seeing Kilo for 5 weeks now for his elbow. While his symptoms have improved significantly they are not entirely gone. I was hoping if you deem it appropriate you could place a referral to orthopedics to have his elbow evaluated.     Thank you,  Ladonna Tucker

## 2023-05-18 NOTE — PROGRESS NOTES
OCCUPATIONAL THERAPY Treatment Note    Name: Kilo Dailey  [facility] Number: [id number]    Therapy Diagnosis:   Encounter Diagnoses   Name Primary?    Decreased  strength of left hand Yes    Numbness and tingling in left hand     Decreased activities of daily living (ADL)      Physician: Kitty Ball MD    Visit Date: 5/18/2023  Physician Orders: Occupational Therapy to Evaluate and Treat  Medical Diagnosis: G56.00 (ICD-10-CM) - Carpal tunnel syndrome, unspecified laterality  Evaluation Date: 4/10/2023  Insurance Authorization Period Expiration: 4/10/2023 - 12/31/2023  Plan of Care Certification Period: 4/10/2023 - 7/10/2023  Progress Note Due: 6/10/2023      Visit # / Visits authorized: 9/24  (10 Episode Visits)  FOTO: 1/3     Surgical Procedure: N/A  Date of Return to MD: N/A     Precautions:  Standard     Time In: 8:00  Time Out: 8:45  Total Appointment Time (timed & untimed codes): 45 minutes       SUBJECTIVE     Today, pt reports: no discomfort. He has minimal symptoms occasionally and is interested in ortho referral to see if there is a procedure to complete stop them  He/She was compliant with home exercise program.  Response to previous treatment: required minimal verbal cues to perform his exercises correctly  Functional change: weight lifting     Pre-Treatment Pain: 0/10  Post-Treatment Pain: 0/10  Location: NA      OBJECTIVE      Please see progress note dated 5/16/2023 for updated objective measures    TREATMENT     Pelon received therapeutic exercises to develop strength, endurance, ROM, flexibility, posture, and core stabilization for 20 minutes including:    Exercise 5/18/2023   Tendon glides bilateral hands 10-20x    Median nerve glides bilateral hands 10x    Median  nerve stretches on wall 5/5 second holds x   Pectoralis stretches supine:  W/T/Y with blue roll x   Thoracic spine stretches on blue roll x   Prone rows and shoulder extension 2-3# 2/15 repetitions x   Ulnar nerve stretch: 5/5  second holds  Supine goggle position  On wall x       Pelon received the following manual therapy techniques: Soft tissue Mobilization were applied to the: bilateral upper trapezius muscles for 10 minutes, including:  STM of  left forearm muscles.      Pelon participated in neuromuscular re-education activities to improve: Posture for 15 minutes. The following activities were included:    Exercise 5/18/2023   Side lying external rotation with scapular retraction 2# 20x  bilateral shoulders   Low rows with scapular retractions blue band x   Bilateral external rotation red x   Forearm Stretch  - wrist flexion  - wrist extension x   Ulnar nerve glides  10x x   Ulnar nerve stretches  goggle 10/5 second holds x   Doorway stretch 135, 90 and 45 X 3- 30 second holds    Forearm curls  -reverse curls  -extension 2lb weight 20x   Digi flex - blue  Supination  Pronation  neutral 20x      Dowel curls with 2 lb dumbbell          Pelon received the following supervised modalities after being cleared for contradictions:     Pelon received hot pack for 0 minutes to 0.    Pelon received cold pack for 0 minutes.      Home Exercises Provided and Patient Education Provided     Education/Self-Care provided: (5 minutes)  Patient educated on biomechanical justification for therapeutic exercise and importance of compliance with HEP in order to improve overall impairments and QOL   Patient educated on postural awareness during band exercises.  Patient educated on proper ergonomics at the work station in order to maintain optimal alignment of the musculoskeletal system and improve efficiency in the work environment.  Recommended nerve glides prior to nerve stretches for warm up    Written Home Exercises Provided: yes.  Exercises were reviewed and Pelon was able to demonstrate them prior to the end of the session.  Pelon demonstrated good  understanding of the education provided.     See EMR under Patient Instructions for exercises provided  4/14/2023.    ASSESSMENT     Pt tolerated manual therapy well with reports of decreased pain and tension in musculature following intervention. Pt tolerated exercise well with reports of increased fatigue but no increased pain. Pt demonstrated good understanding of exercises and required minimal cueing to maintain proper form.  Patient appears to be improving with strengthening exercises and scapular control.    Pelon Is progressing well towards his goals.   Pt prognosis is Good.     Pt will continue to benefit from skilled outpatient occupational therapy to address the deficits listed in the problem list box on initial evaluation, provide pt/family education and to maximize pt's level of independence in the home and community environment.     Pt's spiritual, cultural and educational needs considered and pt agreeable to plan of care and goals.     Anticipated barriers to occupational therapy: compliance with home exercise program/attention span with focus on performing his exercises properly.    Goals:   Short Term Goals:  6 weeks  Progress   5/8/2023   Pain: Patient will demonstrate improved pain by reports of less than or equal to 4/10 worst pain on the verbal rating scale in order to progress toward maximal functional ability and improve quality of life. GM   Function: Patient will demonstrate improved function as indicated by a functional limitation score of less than or equal to 22 out of 100 on FOTO. PC   Mobility: Patient will improve AROM to 50% of stated goals, listed in objective measures above, in order to progress towards independence with functional activities.  GM   Strength: Patient will improve strength to 50% of stated goals, listed in objective measures above, in order to progress towards independence with functional activities.  GM   HEP: Patient will demonstrate independence with HEP in order to progress toward functional independence. PM      Long Term Goals:  12 weeks  Progress  5/8/2023    Pain: Patient will demonstrate improved pain by reports of less than or equal to 2/10 worst pain on the verbal rating scale in order to progress toward maximal functional ability and improve quality of life.   PC   Function: Patient will demonstrate improved function as indicated by a functional limitation score of less than or equal to 21 out of 100 on FOTO. PC   Mobility: Patient will improve AROM to stated goals, listed in objective measures above, in order to return to maximal functional potential and improve quality of life. PC   Strength: Patient will improve strength to stated goals, listed in objective measures above, in order to improve functional independence and quality of life. PC   Patient will return to normal ADL's, IADL's, community involvement, recreational activities, and work-related activities with less than or equal to 2/10 pain and maximal function.  PC      Goals Key:  PC= Progressing/Continue;       PM= Partially Met;       Goal Met= Goal Met      DC= Discontinue       PLAN   Continue Plan of Care (POC) and progress per patient tolerance.    Ladonna Tucker OT, NIKKI

## 2023-05-19 ENCOUNTER — OFFICE VISIT (OUTPATIENT)
Dept: OPHTHALMOLOGY | Facility: CLINIC | Age: 63
End: 2023-05-19
Payer: COMMERCIAL

## 2023-05-19 DIAGNOSIS — H57.13 PAIN IN OR AROUND EYE, BILATERAL: Primary | ICD-10-CM

## 2023-05-19 DIAGNOSIS — H01.006 BLEPHARITIS OF BOTH EYES, UNSPECIFIED EYELID, UNSPECIFIED TYPE: ICD-10-CM

## 2023-05-19 DIAGNOSIS — H01.003 BLEPHARITIS OF BOTH EYES, UNSPECIFIED EYELID, UNSPECIFIED TYPE: ICD-10-CM

## 2023-05-19 PROCEDURE — 99213 PR OFFICE/OUTPT VISIT, EST, LEVL III, 20-29 MIN: ICD-10-PCS | Mod: S$GLB,,, | Performed by: OPTOMETRIST

## 2023-05-19 PROCEDURE — 1160F PR REVIEW ALL MEDS BY PRESCRIBER/CLIN PHARMACIST DOCUMENTED: ICD-10-PCS | Mod: CPTII,S$GLB,, | Performed by: OPTOMETRIST

## 2023-05-19 PROCEDURE — 1160F RVW MEDS BY RX/DR IN RCRD: CPT | Mod: CPTII,S$GLB,, | Performed by: OPTOMETRIST

## 2023-05-19 PROCEDURE — 99999 PR PBB SHADOW E&M-EST. PATIENT-LVL III: CPT | Mod: PBBFAC,,, | Performed by: OPTOMETRIST

## 2023-05-19 PROCEDURE — 1159F PR MEDICATION LIST DOCUMENTED IN MEDICAL RECORD: ICD-10-PCS | Mod: CPTII,S$GLB,, | Performed by: OPTOMETRIST

## 2023-05-19 PROCEDURE — 1159F MED LIST DOCD IN RCRD: CPT | Mod: CPTII,S$GLB,, | Performed by: OPTOMETRIST

## 2023-05-19 PROCEDURE — 99999 PR PBB SHADOW E&M-EST. PATIENT-LVL III: ICD-10-PCS | Mod: PBBFAC,,, | Performed by: OPTOMETRIST

## 2023-05-19 PROCEDURE — 99213 OFFICE O/P EST LOW 20 MIN: CPT | Mod: S$GLB,,, | Performed by: OPTOMETRIST

## 2023-05-19 NOTE — PROGRESS NOTES
HPI     Eye Pain            Comments: Pain Scale: 2 but when touching 6-7 and slight headache  Onset:   a few days ago OD, day or two later OS   OD, OS, OU: OU, OD>OS   Discharge: no  A.M. Matting: no  Itch: no  Redness: no  Photophobia: no  Foreign body sensation: no, just feels dry   Deep pain: no  Previous occurrence: no  Drops:   Systane ultra and lid scrub with tea tree oil   Pt feels pain OU, OD>OS, its better today but would like to have IOP   checked           Comments    1. PCIOL OS 12/6/2018   YAG OD 9/13/22  PCIOL OD +18.5 SN60WF / CDE:16.69 / 12/7/17 BLOCK    Coag susp- based on c/d asym.     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            Assessment /Plan     For exam results, see Encounter Report.    Pain in or around eye, bilateral    Blepharitis of both eyes, unspecified eyelid, unspecified type      Stable IOP today, reassured pt  No iritis, no keratitis  Mild bleph with slight tenderness with palpation of lid margins, likely responsible for symptoms  Begin warm compresses tid-qid OU for 10-15 minutes  Continue lid scrubs as before as well as systane ultra  Consider adding haven ie dexacine if symptoms worsen or persist      RTC PRN if symptoms worsen or not resolved x 1 week  Discussed above and all questions were answered.

## 2023-05-23 ENCOUNTER — HOSPITAL ENCOUNTER (OUTPATIENT)
Dept: RADIOLOGY | Facility: HOSPITAL | Age: 63
Discharge: HOME | End: 2023-05-23
Attending: FAMILY MEDICINE
Payer: COMMERCIAL

## 2023-05-23 ENCOUNTER — CLINICAL SUPPORT (OUTPATIENT)
Dept: REHABILITATION | Facility: HOSPITAL | Age: 63
End: 2023-05-23
Payer: COMMERCIAL

## 2023-05-23 ENCOUNTER — OFFICE VISIT (OUTPATIENT)
Dept: SPORTS MEDICINE | Facility: CLINIC | Age: 63
End: 2023-05-23
Payer: COMMERCIAL

## 2023-05-23 ENCOUNTER — HOSPITAL ENCOUNTER (OUTPATIENT)
Dept: RADIOLOGY | Facility: HOSPITAL | Age: 63
Discharge: HOME | End: 2023-05-23
Attending: STUDENT IN AN ORGANIZED HEALTH CARE EDUCATION/TRAINING PROGRAM
Payer: COMMERCIAL

## 2023-05-23 VITALS — RESPIRATION RATE: 20 BRPM | BODY MASS INDEX: 20.1 KG/M2 | WEIGHT: 148.38 LBS | HEIGHT: 72 IN

## 2023-05-23 DIAGNOSIS — R20.2 NUMBNESS AND TINGLING IN LEFT HAND: ICD-10-CM

## 2023-05-23 DIAGNOSIS — R20.0 NUMBNESS AND TINGLING IN LEFT HAND: ICD-10-CM

## 2023-05-23 DIAGNOSIS — G56.22 CUBITAL TUNNEL SYNDROME ON LEFT: ICD-10-CM

## 2023-05-23 DIAGNOSIS — G56.20 CUBITAL TUNNEL SYNDROME, UNSPECIFIED LATERALITY: ICD-10-CM

## 2023-05-23 DIAGNOSIS — M54.42 CHRONIC BILATERAL LOW BACK PAIN WITH LEFT-SIDED SCIATICA: Primary | ICD-10-CM

## 2023-05-23 DIAGNOSIS — G89.29 CHRONIC BILATERAL LOW BACK PAIN WITH LEFT-SIDED SCIATICA: Primary | ICD-10-CM

## 2023-05-23 DIAGNOSIS — S69.81XA TFCC (TRIANGULAR FIBROCARTILAGE COMPLEX) INJURY, RIGHT, INITIAL ENCOUNTER: ICD-10-CM

## 2023-05-23 DIAGNOSIS — Z78.9 DECREASED ACTIVITIES OF DAILY LIVING (ADL): ICD-10-CM

## 2023-05-23 DIAGNOSIS — R29.898 DECREASED GRIP STRENGTH OF LEFT HAND: Primary | ICD-10-CM

## 2023-05-23 DIAGNOSIS — R53.1 DECREASED STRENGTH: ICD-10-CM

## 2023-05-23 DIAGNOSIS — G89.29 CHRONIC BILATERAL LOW BACK PAIN WITH LEFT-SIDED SCIATICA: ICD-10-CM

## 2023-05-23 DIAGNOSIS — M54.42 CHRONIC BILATERAL LOW BACK PAIN WITH LEFT-SIDED SCIATICA: ICD-10-CM

## 2023-05-23 DIAGNOSIS — M25.60 DECREASED RANGE OF MOTION: Primary | ICD-10-CM

## 2023-05-23 PROCEDURE — 97112 NEUROMUSCULAR REEDUCATION: CPT

## 2023-05-23 PROCEDURE — 3008F PR BODY MASS INDEX (BMI) DOCUMENTED: ICD-10-PCS | Mod: CPTII,S$GLB,, | Performed by: STUDENT IN AN ORGANIZED HEALTH CARE EDUCATION/TRAINING PROGRAM

## 2023-05-23 PROCEDURE — 1159F MED LIST DOCD IN RCRD: CPT | Mod: CPTII,S$GLB,, | Performed by: STUDENT IN AN ORGANIZED HEALTH CARE EDUCATION/TRAINING PROGRAM

## 2023-05-23 PROCEDURE — 73080 X-RAY EXAM OF ELBOW: CPT | Mod: TC,LT

## 2023-05-23 PROCEDURE — 3008F BODY MASS INDEX DOCD: CPT | Mod: CPTII,S$GLB,, | Performed by: STUDENT IN AN ORGANIZED HEALTH CARE EDUCATION/TRAINING PROGRAM

## 2023-05-23 PROCEDURE — 99999 PR PBB SHADOW E&M-EST. PATIENT-LVL IV: ICD-10-PCS | Mod: PBBFAC,,, | Performed by: STUDENT IN AN ORGANIZED HEALTH CARE EDUCATION/TRAINING PROGRAM

## 2023-05-23 PROCEDURE — 99204 PR OFFICE/OUTPT VISIT, NEW, LEVL IV, 45-59 MIN: ICD-10-PCS | Mod: S$GLB,,, | Performed by: STUDENT IN AN ORGANIZED HEALTH CARE EDUCATION/TRAINING PROGRAM

## 2023-05-23 PROCEDURE — 97140 MANUAL THERAPY 1/> REGIONS: CPT

## 2023-05-23 PROCEDURE — 73080 XR ELBOW COMPLETE 3 VIEW LEFT: ICD-10-PCS | Mod: 26,LT,, | Performed by: RADIOLOGY

## 2023-05-23 PROCEDURE — 99204 OFFICE O/P NEW MOD 45 MIN: CPT | Mod: S$GLB,,, | Performed by: STUDENT IN AN ORGANIZED HEALTH CARE EDUCATION/TRAINING PROGRAM

## 2023-05-23 PROCEDURE — 97110 THERAPEUTIC EXERCISES: CPT

## 2023-05-23 PROCEDURE — 72114 XR LUMBAR SPINE 5 VIEW WITH FLEX AND EXT: ICD-10-PCS | Mod: 26,,, | Performed by: RADIOLOGY

## 2023-05-23 PROCEDURE — 73080 X-RAY EXAM OF ELBOW: CPT | Mod: 26,LT,, | Performed by: RADIOLOGY

## 2023-05-23 PROCEDURE — 72114 X-RAY EXAM L-S SPINE BENDING: CPT | Mod: 26,,, | Performed by: RADIOLOGY

## 2023-05-23 PROCEDURE — 99999 PR PBB SHADOW E&M-EST. PATIENT-LVL IV: CPT | Mod: PBBFAC,,, | Performed by: STUDENT IN AN ORGANIZED HEALTH CARE EDUCATION/TRAINING PROGRAM

## 2023-05-23 PROCEDURE — 97112 NEUROMUSCULAR REEDUCATION: CPT | Mod: CQ

## 2023-05-23 PROCEDURE — 97110 THERAPEUTIC EXERCISES: CPT | Mod: CQ

## 2023-05-23 PROCEDURE — 72114 X-RAY EXAM L-S SPINE BENDING: CPT | Mod: TC

## 2023-05-23 PROCEDURE — 1159F PR MEDICATION LIST DOCUMENTED IN MEDICAL RECORD: ICD-10-PCS | Mod: CPTII,S$GLB,, | Performed by: STUDENT IN AN ORGANIZED HEALTH CARE EDUCATION/TRAINING PROGRAM

## 2023-05-23 NOTE — PROGRESS NOTES
OCCUPATIONAL THERAPY Treatment Note    Name: Kilo Dailey  [facility] Number: [id number]    Therapy Diagnosis:   Encounter Diagnoses   Name Primary?    Decreased  strength of left hand Yes    Numbness and tingling in left hand     Decreased activities of daily living (ADL)        Physician: Kitty Ball MD    Visit Date: 5/23/2023  Physician Orders: Occupational Therapy to Evaluate and Treat  Medical Diagnosis: G56.00 (ICD-10-CM) - Carpal tunnel syndrome, unspecified laterality  Evaluation Date: 4/10/2023  Insurance Authorization Period Expiration: 4/10/2023 - 12/31/2023  Plan of Care Certification Period: 4/10/2023 - 7/10/2023  Progress Note Due: 6/10/2023      Visit # / Visits authorized: 10/24  (11 Episode Visits)  FOTO: 1/3     Surgical Procedure: N/A  Date of Return to MD: N/A     Precautions:  Standard     Time In: 8:00  Time Out: 8:45  Total Appointment Time (timed & untimed codes): 45 minutes       SUBJECTIVE     Today, pt reports: no discomfort. He is seeing an orthopedist today   He/She was compliant with home exercise program.  Response to previous treatment: required minimal verbal cues to perform his exercises correctly  Functional change: weight lifting     Pre-Treatment Pain: 0/10  Post-Treatment Pain: 0/10  Location: NA      OBJECTIVE      Please see progress note dated 5/16/2023 for updated objective measures    TREATMENT     Pelon received therapeutic exercises to develop strength, endurance, ROM, flexibility, posture, and core stabilization for 20 minutes including:    Exercise 5/23/2023   Tendon glides bilateral hands 10-20x    Median nerve glides bilateral hands 10x    Median  nerve stretches on wall 5/5 second holds x   Pectoralis stretches supine:  W/T/Y with blue roll x   Thoracic spine stretches on blue roll x   Prone rows and shoulder extension 2-3# 2/15 repetitions x   Ulnar nerve stretch: 5/5 second holds  Supine goggle position  On wall x       Pelon received the following  manual therapy techniques: Soft tissue Mobilization were applied to the: bilateral upper trapezius muscles for 10 minutes, including:  STM of  left forearm muscles.      Pelon participated in neuromuscular re-education activities to improve: Posture for 15 minutes. The following activities were included:    Exercise 5/23/2023   Side lying external rotation with scapular retraction 2# 20x  bilateral shoulders   Low rows with scapular retractions blue band x   Bilateral external rotation red x   Forearm Stretch  - wrist flexion  - wrist extension x   Ulnar nerve glides  10x x   Ulnar nerve stretches  goggle 10/5 second holds x   Doorway stretch 135, 90 and 45 X 3- 30 second holds    Forearm curls  -reverse curls  -extension 2lb weight 20x   Digi flex - blue  Supination  Pronation  neutral 20x      Dowel curls with 2 lb dumbbell          Pelon received the following supervised modalities after being cleared for contradictions:     Pelon received hot pack for 0 minutes to 0.    Pelon received cold pack for 0 minutes.      Home Exercises Provided and Patient Education Provided     Education/Self-Care provided: (5 minutes)  Patient educated on biomechanical justification for therapeutic exercise and importance of compliance with HEP in order to improve overall impairments and QOL   Patient educated on postural awareness during band exercises.  Patient educated on proper ergonomics at the work station in order to maintain optimal alignment of the musculoskeletal system and improve efficiency in the work environment.  Recommended nerve glides prior to nerve stretches for warm up    Written Home Exercises Provided: yes.  Exercises were reviewed and Pelon was able to demonstrate them prior to the end of the session.  Pelon demonstrated good  understanding of the education provided.     See EMR under Patient Instructions for exercises provided 4/14/2023.    ASSESSMENT     Pt tolerated manual therapy well with reports of  decreased pain and tension in musculature following intervention. Pt tolerated exercise well with reports of increased fatigue but no increased pain. Pt demonstrated good understanding of exercises and required minimal cueing to maintain proper form.  Patient appears to be improving with strengthening exercises and scapular control.    Pelon Is progressing well towards his goals.   Pt prognosis is Good.     Pt will continue to benefit from skilled outpatient occupational therapy to address the deficits listed in the problem list box on initial evaluation, provide pt/family education and to maximize pt's level of independence in the home and community environment.     Pt's spiritual, cultural and educational needs considered and pt agreeable to plan of care and goals.     Anticipated barriers to occupational therapy: compliance with home exercise program/attention span with focus on performing his exercises properly.    Goals:   Short Term Goals:  6 weeks  Progress   5/8/2023   Pain: Patient will demonstrate improved pain by reports of less than or equal to 4/10 worst pain on the verbal rating scale in order to progress toward maximal functional ability and improve quality of life. GM   Function: Patient will demonstrate improved function as indicated by a functional limitation score of less than or equal to 22 out of 100 on FOTO. PC   Mobility: Patient will improve AROM to 50% of stated goals, listed in objective measures above, in order to progress towards independence with functional activities.  GM   Strength: Patient will improve strength to 50% of stated goals, listed in objective measures above, in order to progress towards independence with functional activities.  GM   HEP: Patient will demonstrate independence with HEP in order to progress toward functional independence. PM      Long Term Goals:  12 weeks  Progress  5/8/2023   Pain: Patient will demonstrate improved pain by reports of less than or equal to  2/10 worst pain on the verbal rating scale in order to progress toward maximal functional ability and improve quality of life.   PC   Function: Patient will demonstrate improved function as indicated by a functional limitation score of less than or equal to 21 out of 100 on FOTO. PC   Mobility: Patient will improve AROM to stated goals, listed in objective measures above, in order to return to maximal functional potential and improve quality of life. PC   Strength: Patient will improve strength to stated goals, listed in objective measures above, in order to improve functional independence and quality of life. PC   Patient will return to normal ADL's, IADL's, community involvement, recreational activities, and work-related activities with less than or equal to 2/10 pain and maximal function.  PC      Goals Key:  PC= Progressing/Continue;       PM= Partially Met;       Goal Met= Goal Met      DC= Discontinue       PLAN   Continue Plan of Care (POC) and progress per patient tolerance.    Ladonna Tucker OT, NIKKI

## 2023-05-23 NOTE — PROGRESS NOTES
[facility]   Physical Therapy Treatment Note     Name: Kilo Dailey  [facility] Number: [id number]    Therapy Diagnosis:   Encounter Diagnoses   Name Primary?    Decreased range of motion Yes    Decreased strength      Physician: Kitty Ball MD    Visit Date: 5/23/2023    Physician Orders: PT Eval and Treat  Medical Diagnosis from Referral: Foot pain, bilateral  Evaluation Date: 4/24/2023  Authorization Period Expiration: 12/31/23  Plan of Care Expiration: 6/24/23  Progress Note Due: 5/24/2023  Visit # / Visits authorized: 4/25 (+1)  FOTO: 1/3 (last performed on 4/24/2023)    PTA Visit #: 1/5     Progress Note Due on 5/24/2023    Time In: 9:00 am  Time Out: 9:50 am  Total Billable Time: 45 minutes    Precautions: Standard    SUBJECTIVE     Pt reports: some back pain today due to him up/down ladder to paint 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     He was compliant with home exercise program.    Response to previous treatment: No adverse reactions to evaluation    Functional change: No noted functional change    Pain: 3/10  Location:  left foot>right foot (as well as low back pain)    OBJECTIVE     Objective Measures updated at progress report unless specified.     Treatment     Pelon received the treatments listed below:     MANUAL THERAPY TECHNIQUES were applied for 0 minutes, including:    Manual Intervention Performed Today    Soft Tissue Mobilization x Bilateral LB paraspinals and clearing over right iliac crest, bilateral subscapularis and latissimus dorsi releases   Joint Mobilizations  Thoracic spine and LB spine assessed - LB spine limited in mobility bilateral rotation throughout   Mobilization with movement          Functional Dry Needling        Plan for Next Visit: PRN     THERAPEUTIC EXERCISES to develop strength, endurance, ROM, flexibility, posture, and core stabilization for (25) minutes including:    Intervention Performed Today    Upright Bike x 10 minutes level 9    Dynamic Stretches x Hamstring Kicks, Hamstring Scoops, Butt Kicks, Single Knee to Chest 2 laps back and forth 1/3 of turf    Sidelying hip series  X  X  x Hip abduction 10x  Circles CW/CCW 10x  Bicycle 10x FW/BW   Quadruped donkey kicks x  12x, 10x pulses at top of ROM   Standing pigeon x 15 seconds 5x   Kneeling hero   15s/ 5x   Quadruped hydrants x 10x/ 2 sets each side   Lower extremity nerve glide x 2 x 30 sitting bilateral        NEUROMUSCULAR RE-EDUCATION ACTIVITIES to improve Balance, Coordination, Kinesthetic, Sense, Proprioception, and Posture for (20) minutes.  The following were included:    Intervention Performed Today    Sciatic Nerve Glides -  x 3x5 reps supine  3 sets x 5 ankle pumps seated    Quadruped reach backs/ ER  x 10x/ each side   Child's pose x 15 seconds x 5   Quadruped Hip Opener  2x5 reps ([place] of hip)   Open Books x 10x each side with deep breath in and out at end of open   Downward dog <>upward dog x 10x each, bias downward with 15 seconds  hold upward    Seated latissimus dorsi stretch x At tall mat 5x, 15 sec holds            Plan for Next Visit:  downward dog/upward dog, donkey/hydrants, cook band for hamstring mobility       Patient Education and Home Exercises     Home Exercises Provided and Patient Education Provided     Education provided:   - PURPOSE: Patient educated on the impairments noted above and the effects of physical therapy intervention to improve overall condition and QOL.   EXERCISE: Patient was educated on all the above exercise prior/during/after for proper posture, positioning, and execution for safe performance with home exercise program.   STRENGTH: Patient educated on the importance of improved core and extremity strength in order to improve alignment of the spine and extremities with static positions and dynamic movement.   POSTURE: Patient educated on postural awareness to reduce stress and maintain optimal alignment of the spine with static positions and  dynamic movement   5/23/20223: Encouraged home exercise program for mobility and strengthening.    Written Home Exercises Provided: Patient instructed to cont prior HEP. Exercises were reviewed and Pelon was able to demonstrate them prior to the end of the session.  Pelon demonstrated good  understanding of the education provided. See EMR under Patient Instructions for exercises provided during therapy sessions      ASSESSMENT     Patient tolerated all interventions well and is reporting feeling looser after this session.     Pelon Is progressing well towards his goals.   Pt prognosis is Good, if patient is consistent and compliant with Physical Therapy and home exercise program.    Pt will continue to benefit from skilled outpatient physical therapy to address the deficits listed in the problem list box on initial evaluation, provide pt/family education and to maximize pt's level of independence in the home and community environment.     Pt's spiritual, cultural and educational needs considered and pt agreeable to plan of care and goals.     Anticipated barriers to physical therapy:  co-morbidities    Goals:   Reviewed:5/23/2023   Short Term Goals: In 4 weeks   1.Patient to be educated on HEP.  2.Patient to increase active range of motion of LB  to WFL's, in order to improve available range of motion for ADL's.    3.Patient to increase bilateral hip flexion, IR and ER strength by 1/2 grade, in order to improve endurance and increase ability to perform all functional activities for increased time.   4.Patient to improve score on the FOTO, to improve QOL.        Long Term Goals: In 8 weeks  1.Patient to improve score on the FOTO to 13% or less, to improve QOL.  2. Patient to increase AROM of LB spine to WNL's in order to improve available range of motion for ADL's.  3. Patient to have decreased numbness and tingling in bilateral feet, to improve QOL.  4. Patient to normalize MLT bilateral LE's hamstrings, and  posterior calf muscle's in order to increase ability to perform all functional activities.  5. Patient to perform daily activities including sitting in long sitting without increased symptoms.    PLAN   Outpatient Physical Therapy 1 times weekly for 8 weeks to include any combination of the following interventions: Aquatic Therapy, electrical stimulation (PRN), Manual Therapy, Neuromuscular Re-ed, Patient Education/Self Care, Therapeutic Activites, Therapeutic Exercise, Dry Needling, and Moist Hot Pack/Cold Pack    Monitor response to today's treatment session and progress as indicated.      Chidi Mcallister, PTA

## 2023-05-23 NOTE — PATIENT INSTRUCTIONS
Assessment:  Kilo Dailey is a 62 y.o. male   Chief Complaint   Patient presents with    Left Hand - Numbness       Encounter Diagnoses   Name Primary?    Numbness and tingling in left hand     Cubital tunnel syndrome, unspecified laterality     Chronic bilateral low back pain with left-sided sciatica Yes    TFCC (triangular fibrocartilage complex) injury, right, initial encounter         Plan:  Ordered xrays on way out for lumbar spine. We will reach out with findings  Discussed right TFCC injury. Recommend wrist widget for this  Will schedule for L ulnar nerve hydro dissection     Follow-up: For hydro dissection  or sooner if there are any problems between now and then.    Thank you for choosing [facility] and Dr. Milan Avila for your orthopedic & sports medicine care. It is our goal to provide you with exceptional care that will help keep you healthy, active, and get you back in the game.    Please do not hesitate to reach out to us via email, phone, or MyChart with any questions, concerns, or feedback.    If you felt that you received exemplary care today, please consider leaving us feedback on [employer] at:  [email or web address]    If you are experiencing pain/discomfort ,or have questions after 5pm and would like to be connected to the [facility] on-call team, please call this number and specify which Sports Medicine provider is treating you: [phone]

## 2023-05-23 NOTE — PROGRESS NOTES
Patient ID: Kilo Dailey  YOB: 1960  MRN: [id number]    Chief Complaint: Numbness of the Left Hand    Referred By: Kitty Ball MD for cubital tunnel     History of Present Illness: Kilo Dailey is an ambidextrous hand dominant 62 y.o. male who presents today with left cubital tunnel symptoms.     Occupation: Retired    Kilo Dailey states it is Chronic in nature and there was not a specific mechanism.  He has an EMG from [facility] on 5/08/2023 showing electrodiagnostic evidence of a mild demyelinating ulnar neuropathy (Cubital tunnel syndrome) across the left elbow and a very mild demyelinating median neuropathy (Carpal tunnel syndrome) across bilateral wrists. He endorses chronic left hand numbness and tingling into his 4th and 5th digit Aggravating activities include increased usages of hand/arm usage , at night while sleeping, driving, and other time in elbow flexion. He is currently in occupational therapy at [facility].   Kilo Dailey describes the pain as a intermittent stinging and numbness at 4th and 5th digits. Associated symptoms include: Swelling No, Instability No, Pain that affects your sleep Yes, Mechanical No, locking/catching No, Neurological Yes, limited range of motion No.    Past Medical History:   Past Medical History:   Diagnosis Date    Allergic rhinitis     BPH (benign prostatic hyperplasia)     Colon polyps     Hyperlipidemia     Tennis elbow     Tinea versicolor      Past Surgical History:   Procedure Laterality Date    CATARACT EXTRACTION W/  INTRAOCULAR LENS IMPLANT Right 12/07/2017    CATARACT EXTRACTION W/  INTRAOCULAR LENS IMPLANT Left 12/06/2018    COLONOSCOPY N/A 04/25/2017    Procedure: COLONOSCOPY;  Surgeon: Edilberto Perez MD;  Location: [facility] ENDO;  Service: Endoscopy;  Laterality: N/A;    COLONOSCOPY N/A 08/11/2022    Procedure: COLONOSCOPY;  Surgeon: Les Benitez MD;  Location: [facility] ENDO;  Service: Endoscopy;  Laterality: N/A;     EYE SURGERY  2 years ago    cataracts     Family History   Problem Relation Age of Onset    Hyperlipidemia Father         Protate cancer age 75    Hearing loss Father     Prostate cancer Father     Macular degeneration Father     Dementia Father     Prostate cancer Paternal Grandfather     Hyperlipidemia Mother         age 92 w mild dementia    Dementia Mother     Seizures Mother     Arthritis Mother         in the spine    Retinitis pigmentosa Sister     Cataracts Sister     Alzheimer's disease Maternal Aunt     Cataracts Maternal Aunt     Alzheimer's disease Maternal Aunt     Alzheimer's disease Maternal Uncle     Cataracts Maternal Uncle     Liver cancer Maternal Uncle     Cataracts Maternal Grandmother     Prostate cancer Paternal Uncle      Social History     Socioeconomic History    Marital status:    Tobacco Use    Smoking status: Never     Passive exposure: Never    Smokeless tobacco: Never   Substance and Sexual Activity    Alcohol use: Never     Comment: 1-2 times a month    Drug use: No    Sexual activity: Yes     Partners: Female     Birth control/protection: None   Social History Narrative    The patient is , has 2 children and retired as a .               Social Determinants of Health     Financial Resource Strain: Low Risk     Difficulty of Paying Living Expenses: Not hard at all   Food Insecurity: No Food Insecurity    Worried About Running Out of Food in the Last Year: Never true    Ran Out of Food in the Last Year: Never true   Transportation Needs: No Transportation Needs    Lack of Transportation (Medical): No    Lack of Transportation (Non-Medical): No   Physical Activity: Sufficiently Active    Days of Exercise per Week: 4 days    Minutes of Exercise per Session: 50 min   Stress: No Stress Concern Present    Feeling of Stress : Only a little   Social Connections: Unknown    Frequency of Communication with Friends and Family: More than three times a week     Frequency of Social Gatherings with Friends and Family: Twice a week    Active Member of Clubs or Organizations: No    Attends Club or Organization Meetings: Never    Marital Status:    Housing Stability: Low Risk     Unable to Pay for Housing in the Last Year: No    Number of Places Lived in the Last Year: 1    Unstable Housing in the Last Year: No     Medication List with Changes/Refills   Current Medications    ATORVASTATIN (LIPITOR) 10 MG TABLET    Take 1 tablet (10 mg total) by mouth once daily.    CLINDAMYCIN (CLEOCIN T) 1 % EXTERNAL SOLUTION    Apply topically 2 (two) times daily.    CLOBETASOL (TEMOVATE) 0.05 % EXTERNAL SOLUTION    Apply topically.    DOXYCYCLINE (VIBRA-TABS) 100 MG TABLET    Take 1 po qday with food and not within 1 hour prior to lying down    ERYTHROMYCIN (ROMYCIN) OPHTHALMIC OINTMENT    Place into both eyes as needed.    GLUCOSAMINE-CHONDROITIN 500-400 MG TABLET    Take 1 tablet by mouth once daily.    KETOCONAZOLE 2 % FOAM    AAA BID for rash on face, chest and back.  For seborrheic dermatitis and tinea versicolor.    MUPIROCIN (BACTROBAN) 2 % OINTMENT    SMARTSIG:Sparingly Topical 3 Times Daily     Review of patient's allergies indicates:  No Known Allergies    Physical Exam:   Body mass index is 20.12 kg/m².    GENERAL: Well appearing, in no acute distress.  HEAD: Normocephalic and atraumatic.  ENT: External ears and nose grossly normal.  EYES: EOMI bilaterally  PULMONARY: Respirations are grossly even and non-labored.  NEURO: Awake, alert, and oriented x 3.  SKIN: No obvious rashes appreciated.  PSYCH: Mood & affect are appropriate.    Detailed MSK exam:   L elbow: No obvious deformities, no ecchymosis, no erythema . No effusion. Full ROM . Mild hypothenar eminence atrophy. Moving valgus negative. Valgus  negative. Forced extension with valgus stress negative. Varus negative. Tinel at cubital tunnel positive. Resisted wrist extension (Cozen) negative. Passive wrist flexion  (Mills) negative. Resisted middle finger  extension (Maudsley) negative. Resisted wrist flexion negative. Resisted supination  negative. Elbow extension test negative.     N/V Rad Exam:  C4 Normal sensory (clavicle)   Normal motor (no scapular winging)     C5 Normal sensory (lateral upper arm)  Normal motor (shoulder abd)     C6 Normal sensory (radial hand)   Normal motor (wrist ext)  C7 Normal sensory (2, 3, 4 fingers)  Normal motor (wrist flex)  C8 Abnormal sensory (5th finger)   Normal motor (thumb ext)  T1 Normal sensory (medial elbow)  Normal motor (finger abd)    Biceps tendon reflex normal  Brachialis tendon reflex normal  Triceps tendon reflex normal  Mcmillan's reflex normal     Normal radial and ulnar pulses, warm and well perfused with capillary refill < 2 sec       R wrist:  No obvious deformities, no ecchymosis, no erythema. No effusion. Full ROM. Fromet's sign negative. Resisted supination pain negative. Snuffbox tenderness, tubercle tenderness, scaphoid compression negative. Carpal Tinel sign negative. Carpal compression negative. Wrist flexion overpressure 30sec negative. Wrist extension over pressure 60sec negative. Grind test negative Valgus negative  Varus negative. Volar plate stress negative. Supination/lift-off  negative. Press test negative. TFCC compression negative. TFCC tension negative. FOVEA sign negative. Tender to palpate volar aspect TFCC.     N/V Rad Exam:  C4 Normal sensory (clavicle)   Normal motor (no scapular winging)     C5 Normal sensory (lateral upper arm)  Normal motor (shoulder abd)     C6 Normal sensory (radial hand)   Normal motor (wrist ext)  C7 Normal sensory (2, 3, 4 fingers)  Normal motor (wrist flex)  C8 Normal sensory (5th finger)   Normal motor (thumb ext)  T1 Normal sensory (medial elbow)  Normal motor (finger abd)    Biceps tendon reflex normal  Brachialis tendon reflex normal  Triceps tendon reflex normal  Mcmillan's reflex normal     Normal radial and ulnar pulses,  warm and well perfused with capillary refill < 2 sec     Imaging:  X-Ray Elbow Complete Left  Narrative: EXAM: XR ELBOW COMPLETE 3 VIEW LEFT    HISTORY: Paresthesias.    FINDINGS:   5 views were obtained of the left elbow.    No acute fracture, dislocation or radiopaque foreign body.  No displaced fat pads.  No bone lesions or periosteal reaction.  Impression:  No acute findings are identified.    Finalized on: 5/23/2023 10:33 AM By:  Arnaldo Rubin MD  BRR# [id number]      2023-05-23 10:35:[id number]    RONALDO       Relevant imaging results were reviewed and interpreted by me and per my read of.  This was discussed with the patient and / or family today.     Assessment:  Kilo Dailey is a 62 y.o. male presents today for mild cubital tunnel see an EMG with persistent symptoms following conservative treatment including bracing activity modification topicals and occupational therapy.  Still having some persistent numbness and tingling some mild hypothenar atrophy appreciated as well but overall good motor function.  Discussed a ulnar nerve hydrodissection with probable corticosteroid at that time.  Discussed procedure in depth that is not successful may need a transposition.  He would like to hold off on surgery as long as possible.  As for his right wrist and tenderness over the TFCC today discussed a wrist widget with him he can buy on [employer].  He requested back x-ray for a recent history of some mild radiculopathy in his lower back that he is seeing PT 4.  Lumbar x-ray on the way out today for baseline.    Chronic bilateral low back pain with left-sided sciatica  -     X-Ray Lumbar Complete Including Flex And Ext; Future; Expected date: 05/23/2023    Numbness and tingling in left hand  -     Ambulatory referral/consult to Orthopedics    Cubital tunnel syndrome on left  -     Ambulatory referral/consult to Orthopedics    TFCC (triangular fibrocartilage complex) injury, right, initial encounter         A copy of  today's visit note has been sent to the referring provider.       Milan Avila MD    Disclaimer: This note was prepared using a voice recognition system and is likely to have sound alike errors within the text.

## 2023-05-25 ENCOUNTER — CLINICAL SUPPORT (OUTPATIENT)
Dept: REHABILITATION | Facility: HOSPITAL | Age: 63
End: 2023-05-25
Payer: COMMERCIAL

## 2023-05-25 DIAGNOSIS — R29.898 DECREASED GRIP STRENGTH OF LEFT HAND: Primary | ICD-10-CM

## 2023-05-25 DIAGNOSIS — Z78.9 DECREASED ACTIVITIES OF DAILY LIVING (ADL): ICD-10-CM

## 2023-05-25 DIAGNOSIS — R20.2 NUMBNESS AND TINGLING IN LEFT HAND: ICD-10-CM

## 2023-05-25 DIAGNOSIS — R20.0 NUMBNESS AND TINGLING IN LEFT HAND: ICD-10-CM

## 2023-05-25 PROCEDURE — 97140 MANUAL THERAPY 1/> REGIONS: CPT

## 2023-05-25 PROCEDURE — 97112 NEUROMUSCULAR REEDUCATION: CPT

## 2023-05-25 PROCEDURE — 97110 THERAPEUTIC EXERCISES: CPT

## 2023-05-25 NOTE — PROGRESS NOTES
OCCUPATIONAL THERAPY Treatment Note    Name: Kilo Dailey  [facility] Number: [id number]    Therapy Diagnosis:   Encounter Diagnoses   Name Primary?    Decreased  strength of left hand Yes    Numbness and tingling in left hand     Decreased activities of daily living (ADL)        Physician: Kitty Ball MD    Visit Date: 5/25/2023  Physician Orders: Occupational Therapy to Evaluate and Treat  Medical Diagnosis: G56.00 (ICD-10-CM) - Carpal tunnel syndrome, unspecified laterality  Evaluation Date: 4/10/2023  Insurance Authorization Period Expiration: 4/10/2023 - 12/31/2023  Plan of Care Certification Period: 4/10/2023 - 7/10/2023  Progress Note Due: 6/10/2023      Visit # / Visits authorized: 11/24  (12 Episode Visits)  FOTO: 1/3     Surgical Procedure: N/A  Date of Return to MD: N/A     Precautions:  Standard     Time In: 8:00  Time Out: 8:45  Total Appointment Time (timed & untimed codes): 45 minutes       SUBJECTIVE     Today, pt reports: He will be having a hydro dissection on his ulnar nerve by Dr Avila on 6/5. Therapy will be held after today until after procedure.   He/She was compliant with home exercise program.  Response to previous treatment: required minimal verbal cues to perform his exercises correctly  Functional change: weight lifting     Pre-Treatment Pain: 0/10  Post-Treatment Pain: 0/10  Location: NA      OBJECTIVE      Please see progress note dated 5/16/2023 for updated objective measures    TREATMENT     Pelon received therapeutic exercises to develop strength, endurance, ROM, flexibility, posture, and core stabilization for 20 minutes including:    Exercise 5/25/2023   Tendon glides bilateral hands 10-20x    Median nerve glides bilateral hands 10x    Median  nerve stretches on wall 5/5 second holds x   Pectoralis stretches supine:  W/T/Y with blue roll x   Thoracic spine stretches on blue roll x   Prone rows and shoulder extension 2-3# 2/15 repetitions x   Ulnar nerve stretch: 5/5  second holds  Supine goggle position  On wall x       Pelon received the following manual therapy techniques: Soft tissue Mobilization were applied to the: bilateral upper trapezius muscles for 10 minutes, including:  STM of  left forearm muscles.      Pelon participated in neuromuscular re-education activities to improve: Posture for 15 minutes. The following activities were included:    Exercise 5/25/2023   Side lying external rotation with scapular retraction 2# 20x  bilateral shoulders   Low rows with scapular retractions blue band x   Bilateral external rotation red x   Forearm Stretch  - wrist flexion  - wrist extension x   Ulnar nerve glides  10x x   Ulnar nerve stretches  goggle 10/5 second holds x   Doorway stretch 135, 90 and 45 X 3- 30 second holds    Forearm curls  -reverse curls  -extension 2lb weight 20x   Digi flex - blue  Supination  Pronation  neutral 20x      Dowel curls with 2 lb dumbbell          Pelon received the following supervised modalities after being cleared for contradictions:     Pelon received hot pack for 0 minutes to 0.    Pelon received cold pack for 0 minutes.      Home Exercises Provided and Patient Education Provided     Education/Self-Care provided: (5 minutes)  Patient educated on biomechanical justification for therapeutic exercise and importance of compliance with HEP in order to improve overall impairments and QOL   Patient educated on postural awareness during band exercises.  Patient educated on proper ergonomics at the work station in order to maintain optimal alignment of the musculoskeletal system and improve efficiency in the work environment.  Recommended nerve glides prior to nerve stretches for warm up    Written Home Exercises Provided: yes.  Exercises were reviewed and Pelon was able to demonstrate them prior to the end of the session.  Pelon demonstrated good  understanding of the education provided.     See EMR under Patient Instructions for exercises provided  4/14/2023.    ASSESSMENT     Pt tolerated manual therapy well with reports of decreased pain and tension in musculature following intervention. Pt tolerated exercise well with reports of increased fatigue but no increased pain. Pt demonstrated good understanding of exercises and required minimal cueing to maintain proper form.  Patient appears to be improving with strengthening exercises and scapular control.    Pelon Is progressing well towards his goals.   Pt prognosis is Good.     Pt will continue to benefit from skilled outpatient occupational therapy to address the deficits listed in the problem list box on initial evaluation, provide pt/family education and to maximize pt's level of independence in the home and community environment.     Pt's spiritual, cultural and educational needs considered and pt agreeable to plan of care and goals.     Anticipated barriers to occupational therapy: compliance with home exercise program/attention span with focus on performing his exercises properly.    Goals:   Short Term Goals:  6 weeks  Progress   5/8/2023   Pain: Patient will demonstrate improved pain by reports of less than or equal to 4/10 worst pain on the verbal rating scale in order to progress toward maximal functional ability and improve quality of life. GM   Function: Patient will demonstrate improved function as indicated by a functional limitation score of less than or equal to 22 out of 100 on FOTO. PC   Mobility: Patient will improve AROM to 50% of stated goals, listed in objective measures above, in order to progress towards independence with functional activities.  GM   Strength: Patient will improve strength to 50% of stated goals, listed in objective measures above, in order to progress towards independence with functional activities.  GM   HEP: Patient will demonstrate independence with HEP in order to progress toward functional independence. PM      Long Term Goals:  12 weeks  Progress  5/8/2023    Pain: Patient will demonstrate improved pain by reports of less than or equal to 2/10 worst pain on the verbal rating scale in order to progress toward maximal functional ability and improve quality of life.   PC   Function: Patient will demonstrate improved function as indicated by a functional limitation score of less than or equal to 21 out of 100 on FOTO. PC   Mobility: Patient will improve AROM to stated goals, listed in objective measures above, in order to return to maximal functional potential and improve quality of life. PC   Strength: Patient will improve strength to stated goals, listed in objective measures above, in order to improve functional independence and quality of life. PC   Patient will return to normal ADL's, IADL's, community involvement, recreational activities, and work-related activities with less than or equal to 2/10 pain and maximal function.  PC      Goals Key:  PC= Progressing/Continue;       PM= Partially Met;       Goal Met= Goal Met      DC= Discontinue       PLAN   Continue Plan of Care (POC) and progress per patient tolerance.    Ladonna Tucker OT, NIKKI

## 2023-05-29 ENCOUNTER — PATIENT MESSAGE (OUTPATIENT)
Dept: REHABILITATION | Facility: HOSPITAL | Age: 63
End: 2023-05-29
Payer: COMMERCIAL

## 2023-05-30 ENCOUNTER — CLINICAL SUPPORT (OUTPATIENT)
Dept: REHABILITATION | Facility: HOSPITAL | Age: 63
End: 2023-05-30
Payer: COMMERCIAL

## 2023-05-30 DIAGNOSIS — R53.1 DECREASED STRENGTH: ICD-10-CM

## 2023-05-30 DIAGNOSIS — M25.60 DECREASED RANGE OF MOTION: Primary | ICD-10-CM

## 2023-05-30 PROCEDURE — 97110 THERAPEUTIC EXERCISES: CPT

## 2023-05-30 PROCEDURE — 97112 NEUROMUSCULAR REEDUCATION: CPT

## 2023-05-30 NOTE — PROGRESS NOTES
[facility]   Physical Therapy Treatment Note & PROGRESS NOTE    Name: Kilo Dailey  [facility] Number: [id number]    Therapy Diagnosis:   Encounter Diagnoses   Name Primary?    Decreased range of motion Yes    Decreased strength      Physician: Kitty Ball MD    Visit Date: 5/30/2023    Physician Orders: PT Eval and Treat  Medical Diagnosis from Referral: Foot pain, bilateral  Evaluation Date: 4/24/2023  Authorization Period Expiration: 12/31/23  Plan of Care Expiration: 6/24/23  Progress Note Due: 6/24/2023  Visit # / Visits authorized: 5/25 (+1)  FOTO: 1/3 (last performed on 4/24/2023)    PTA Visit #: 0/5     Time In: 9:00 am  Time Out: 9:45 am  Total Billable Time: 42 minutes    Precautions: Standard    SUBJECTIVE     Pt reports: He is doing better with numbness in feet but is having pain in his back with certain activities (soreness), numbness and tingling in his feet is better at night.  He is having some different foot pain and will see podiatry later for a follow up.  He has an appointment with back MD and would like to hold therapy until he see's them.  He is feeling better overall, but xray showed some bone spurs and he would like to get everything checked out. Encouraged home exercise program.    He was compliant with home exercise program.    Response to previous treatment: No adverse reactions to evaluation    Functional change: No noted functional change    Pain: 3/10  Location:  left foot>right foot (as well as low back pain)    OBJECTIVE     Objective Measures updated at progress report unless specified.        Balance  Right   5/30/23 Left  5/30/23 Pain/dysfunction Noted   Single Leg Stance 30 30                        AROM:  Motion: Movement Results: 5/30/23   Multi-Segmental Flexion upper shin Lower shin   Multi-Segmental Extension 60% 75%   Multi-Segmental Rotation 50% 90%   Multi-Segmental  sidebending lower thigh Top of knee bilateral          Strength:     L/E  MMT Right  evaluation Left  evaluation Pain/Dysfunction with Movement 5/30/23  Bilateral    Modified (90/90) Abdominal Strength  4/5 ---   NT   Hip Flexion  4/5 4/5 Pain right groin 4/5-4+/5   Hip Extension  NT NT   NT   Hip Abduction  4+/5 4+/5   NT   Knee Extension 4+/5 4+/5   5/5   Knee Flexion 4+/5 4+/5   5/5   Hip IR 4-/5 4-/5   4-/5, + trunk shift   Hip ER 4-/5 4-/5   4-/5, + trunk shift   Ankle DF 4+/5 4+/5   5/5   Ankle PF 4+/5 4+/5   5/5         Muscle Length:         Muscle Tested  Right  evaluation Left   evaluation 5/30/23   Hamstrings  [] Normal      [x] Limited [] Normal      [x] Limited [x] Normal      [] Limited    Piriformis  [] Normal      [x] Limited [] Normal      [x] Limited [x] Normal      [] Limited    Gastrocnemius  [] Normal      [x] Limited [] Normal      [x] Limited NT    Soleus  [] Normal      [x] Limited [] Normal      [x] Limited  NT      Special Tests:       Right  5/30/23 Left   5/30/23 5/30/23   SLUMP [x] Positive    [] Negative [x] Positive    [] Negative [] Positive    [x] Negative   Straight Leg Raise [x] Positive    [] Negative [x] Positive    [] Negative [] Positive    [x] Negative   REJI [x] Positive    [] Negative [x] Positive    [] Negative NT      FOTO:        Treatment     Pelon received the treatments listed below:     MANUAL THERAPY TECHNIQUES were applied for 0 minutes, including:    Manual Intervention Performed Today    Soft Tissue Mobilization - Bilateral LB paraspinals and clearing over right iliac crest, bilateral subscapularis and latissimus dorsi releases   Joint Mobilizations  Thoracic spine and LB spine assessed - LB spine limited in mobility bilateral rotation throughout   Mobilization with movement          Functional Dry Needling        Plan for Next Visit: PRN     THERAPEUTIC EXERCISES to develop strength, endurance, ROM, flexibility, posture, and core stabilization for (30) minutes including:    Intervention Performed Today    Upright Bike x 10 minutes  level 9   Dynamic Stretches x Hamstring Kicks, Hamstring Scoops, Butt Kicks, Single Knee to Chest 2 laps back and forth 1/3 of turf    Sidelying hip series  x  x  x Hip abduction 10x  Circles CW/CCW 10x  Bicycle 10x FW/BW   Quadruped donkey kicks x  3 sets 10x pulses at top of ROM   Standing pigeon - 15 seconds 5x   Kneeling hero   15s/ 5x   Quadruped hydrants x 10x/ 2 sets each side   Re-assesment x        NEUROMUSCULAR RE-EDUCATION ACTIVITIES to improve Balance, Coordination, Kinesthetic, Sense, Proprioception, and Posture for (12) minutes.  The following were included:    Intervention Performed Today    Sciatic Nerve Glides -  x 3x5 reps supine  3 sets x 5 ankle pumps seated    Quadruped reach backs/ ER  x 10x/ each side   Child's pose x 15 seconds x 5   Quadruped Hip Opener  2x5 reps ([place] of hip)   Open Books x 10x each side with deep breath in and out at end of open   Downward dog <>upward dog x 10x each, bias downward with 15 seconds  hold upward    Seated latissimus dorsi stretch - At tall mat 5x, 15 sec holds            Plan for Next Visit:  downward dog/upward dog, donkey/hydrants, cook band for hamstring mobility       Patient Education and Home Exercises     Home Exercises Provided and Patient Education Provided     Education provided:   - PURPOSE: Patient educated on the impairments noted above and the effects of physical therapy intervention to improve overall condition and QOL.   EXERCISE: Patient was educated on all the above exercise prior/during/after for proper posture, positioning, and execution for safe performance with home exercise program.   STRENGTH: Patient educated on the importance of improved core and extremity strength in order to improve alignment of the spine and extremities with static positions and dynamic movement.   POSTURE: Patient educated on postural awareness to reduce stress and maintain optimal alignment of the spine with static positions and dynamic movement   5/23/20223:  Encouraged home exercise program for mobility and strengthening.    Written Home Exercises Provided: Patient instructed to cont prior HEP. Exercises were reviewed and Pelon was able to demonstrate them prior to the end of the session.  Pelon demonstrated good  understanding of the education provided. See EMR under Patient Instructions for exercises provided during therapy sessions      ASSESSMENT     Patient has made progress with objective measurements and reports decreased pain levels when sitting in chair. He is reporting functional improvement verbally but FOTO score is slightly worse then IE.  Encouraged home exercise program consistency, and hip strengthening.    Pelon Is progressing well towards his goals.   Pt prognosis is Good, if patient is consistent and compliant with Physical Therapy and home exercise program.    Pt will continue to benefit from skilled outpatient physical therapy to address the deficits listed in the problem list box on initial evaluation, provide pt/family education and to maximize pt's level of independence in the home and community environment.     Pt's spiritual, cultural and educational needs considered and pt agreeable to plan of care and goals.     Anticipated barriers to physical therapy:  co-morbidities    Goals:   Reviewed:5/30/2023   Short Term Goals: In 4 weeks   1.Patient to be educated on HEP. 5/30/2023 ongoing  2.Patient to increase active range of motion of LB  to WFL's, in order to improve available range of motion for ADL's.  5/30/2023 MET  3.Patient to increase bilateral hip flexion, IR and ER strength by 1/2 grade, in order to improve endurance and increase ability to perform all functional activities for increased time. 5/30/2023 progressing  4.Patient to improve score on the FOTO, to improve QOL. 5/30/2023 not met        Long Term Goals: In 8 weeks  1.Patient to improve score on the FOTO to 13% or less, to improve QOL. 5/30/2023 not met  2. Patient to increase  AROM of LB spine to WNL's in order to improve available range of motion for ADL's. 5/30/2023 progressing  3. Patient to have decreased numbness and tingling in bilateral feet, to improve QOL. 5/30/2023 MET  4. Patient to normalize MLT bilateral LE's hamstrings, and posterior calf muscle's in order to increase ability to perform all functional activities. 5/30/2023 partially met  5. Patient to perform daily activities including sitting in long sitting without increased symptoms. 5/30/2023 Progressing    PLAN   Outpatient Physical Therapy 1 times weekly for 8 weeks to include any combination of the following interventions: Aquatic Therapy, electrical stimulation (PRN), Manual Therapy, Neuromuscular Re-ed, Patient Education/Self Care, Therapeutic Activites, Therapeutic Exercise, Dry Needling, and Moist Hot Pack/Cold Pack.    Monitor response to today's treatment session and progress as indicated.      Melissa Loo, PT

## 2023-06-05 ENCOUNTER — HOSPITAL ENCOUNTER (OUTPATIENT)
Dept: RADIOLOGY | Facility: HOSPITAL | Age: 63
Discharge: HOME | End: 2023-06-05
Attending: STUDENT IN AN ORGANIZED HEALTH CARE EDUCATION/TRAINING PROGRAM
Payer: COMMERCIAL

## 2023-06-05 ENCOUNTER — OFFICE VISIT (OUTPATIENT)
Dept: SPORTS MEDICINE | Facility: CLINIC | Age: 63
End: 2023-06-05
Payer: COMMERCIAL

## 2023-06-05 VITALS — BODY MASS INDEX: 20.05 KG/M2 | HEIGHT: 72 IN | WEIGHT: 148 LBS

## 2023-06-05 DIAGNOSIS — M25.521 RIGHT ELBOW PAIN: Primary | ICD-10-CM

## 2023-06-05 DIAGNOSIS — M25.521 RIGHT ELBOW PAIN: ICD-10-CM

## 2023-06-05 DIAGNOSIS — G56.22 CUBITAL TUNNEL SYNDROME ON LEFT: Primary | ICD-10-CM

## 2023-06-05 PROCEDURE — 99499 UNLISTED E&M SERVICE: CPT | Mod: S$GLB,,, | Performed by: STUDENT IN AN ORGANIZED HEALTH CARE EDUCATION/TRAINING PROGRAM

## 2023-06-05 PROCEDURE — 73080 XR ELBOW COMPLETE 3 VIEW RIGHT: ICD-10-PCS | Mod: 26,RT,, | Performed by: STUDENT IN AN ORGANIZED HEALTH CARE EDUCATION/TRAINING PROGRAM

## 2023-06-05 PROCEDURE — 73080 X-RAY EXAM OF ELBOW: CPT | Mod: 26,RT,, | Performed by: STUDENT IN AN ORGANIZED HEALTH CARE EDUCATION/TRAINING PROGRAM

## 2023-06-05 PROCEDURE — 20606 DRAIN/INJ JOINT/BURSA W/US: CPT | Mod: LT,S$GLB,, | Performed by: STUDENT IN AN ORGANIZED HEALTH CARE EDUCATION/TRAINING PROGRAM

## 2023-06-05 PROCEDURE — 99999 PR PBB SHADOW E&M-EST. PATIENT-LVL III: ICD-10-PCS | Mod: PBBFAC,,, | Performed by: STUDENT IN AN ORGANIZED HEALTH CARE EDUCATION/TRAINING PROGRAM

## 2023-06-05 PROCEDURE — 20606 INTERMEDIATE JOINT ASPIRATION/INJECTION: ICD-10-PCS | Mod: LT,S$GLB,, | Performed by: STUDENT IN AN ORGANIZED HEALTH CARE EDUCATION/TRAINING PROGRAM

## 2023-06-05 PROCEDURE — 99999 PR PBB SHADOW E&M-EST. PATIENT-LVL III: CPT | Mod: PBBFAC,,, | Performed by: STUDENT IN AN ORGANIZED HEALTH CARE EDUCATION/TRAINING PROGRAM

## 2023-06-05 PROCEDURE — 73080 X-RAY EXAM OF ELBOW: CPT | Mod: TC,RT

## 2023-06-05 PROCEDURE — 99499 NO LOS: ICD-10-PCS | Mod: S$GLB,,, | Performed by: STUDENT IN AN ORGANIZED HEALTH CARE EDUCATION/TRAINING PROGRAM

## 2023-06-05 RX ORDER — LIDOCAINE HYDROCHLORIDE 10 MG/ML
5 INJECTION INFILTRATION; PERINEURAL
Status: DISCONTINUED | OUTPATIENT
Start: 2023-06-05 | End: 2023-06-05 | Stop reason: HOSPADM

## 2023-06-05 RX ADMIN — LIDOCAINE HYDROCHLORIDE 5 ML: 10 INJECTION INFILTRATION; PERINEURAL at 03:06

## 2023-06-05 NOTE — PROCEDURES
Intermediate Joint Aspiration/Injection    Date/Time: 6/5/2023 3:20 PM  Performed by: Milan Avila MD  Authorized by: Milan Avila MD     Consent Done?:  Yes (Verbal)  Indications:  Pain and diagnostic evaluation  Site marked: The procedure site was marked    Timeout: Prior to procedure the correct patient, procedure, and site was verified      Location:  Elbow  Elbow joint: Ulnar nerve hydrodissection distal to the cubital tunnel.  Prep: Patient was prepped and draped in usual sterile fashion    Ultrasonic Guidance for needle placement: Yes  Images are saved and documented.    Needle size:  25 G  Approach:  Volar  Medications:  5 mL LIDOcaine HCL 10 mg/ml (1%) 10 mg/mL (1 %) (10 cc sterile water)  Patient tolerance:  Patient tolerated the procedure well with no immediate complications       Additional Comments: We discussed the proper protocols after the injection such as no submerging pools, baths tubs, or hot tubs for 24 hr.  Showering is okay today.  We also discussed that blood sugars can be elevated after an injection and asked patient to properly checked her sugars over the next few days and contact their PCP if there are any concerns.  We discussed red flags such as fevers, chills, red, warm, tender joint at the area of injection to please seek medical care immediately.      Ultrasound guidance was used for needle localization. Images were saved and stored for documentation. The appropriate structures were visualized. Dynamic visualization of the needle was continuous throughout the procedures and maintained good position.

## 2023-06-09 ENCOUNTER — OFFICE VISIT (OUTPATIENT)
Dept: PODIATRY | Facility: CLINIC | Age: 63
End: 2023-06-09
Payer: COMMERCIAL

## 2023-06-09 VITALS — BODY MASS INDEX: 20.05 KG/M2 | WEIGHT: 148 LBS | HEIGHT: 72 IN

## 2023-06-09 DIAGNOSIS — M19.079 ARTHRITIS OF FOOT: Primary | ICD-10-CM

## 2023-06-09 DIAGNOSIS — M79.2 NEURITIS: ICD-10-CM

## 2023-06-09 PROCEDURE — 1160F PR REVIEW ALL MEDS BY PRESCRIBER/CLIN PHARMACIST DOCUMENTED: ICD-10-PCS | Mod: CPTII,S$GLB,, | Performed by: PODIATRIST

## 2023-06-09 PROCEDURE — 99203 OFFICE O/P NEW LOW 30 MIN: CPT | Mod: S$GLB,,, | Performed by: PODIATRIST

## 2023-06-09 PROCEDURE — 3008F BODY MASS INDEX DOCD: CPT | Mod: CPTII,S$GLB,, | Performed by: PODIATRIST

## 2023-06-09 PROCEDURE — 99203 PR OFFICE/OUTPT VISIT, NEW, LEVL III, 30-44 MIN: ICD-10-PCS | Mod: S$GLB,,, | Performed by: PODIATRIST

## 2023-06-09 PROCEDURE — 99999 PR PBB SHADOW E&M-EST. PATIENT-LVL III: ICD-10-PCS | Mod: PBBFAC,,, | Performed by: PODIATRIST

## 2023-06-09 PROCEDURE — 1159F PR MEDICATION LIST DOCUMENTED IN MEDICAL RECORD: ICD-10-PCS | Mod: CPTII,S$GLB,, | Performed by: PODIATRIST

## 2023-06-09 PROCEDURE — 1160F RVW MEDS BY RX/DR IN RCRD: CPT | Mod: CPTII,S$GLB,, | Performed by: PODIATRIST

## 2023-06-09 PROCEDURE — 1159F MED LIST DOCD IN RCRD: CPT | Mod: CPTII,S$GLB,, | Performed by: PODIATRIST

## 2023-06-09 PROCEDURE — 3008F PR BODY MASS INDEX (BMI) DOCUMENTED: ICD-10-PCS | Mod: CPTII,S$GLB,, | Performed by: PODIATRIST

## 2023-06-09 PROCEDURE — 99999 PR PBB SHADOW E&M-EST. PATIENT-LVL III: CPT | Mod: PBBFAC,,, | Performed by: PODIATRIST

## 2023-06-09 NOTE — PROGRESS NOTES
Subjective:       Patient ID: Kilo Dailey is a 62 y.o. male.    Chief Complaint: Foot Pain (C/o left foot pain, plantar aspect, x couple weeks currently 0 pain, diabetic wears tennis shoes and socks, Last seen ANNA Han  03/29/2023)      HPI:  Kilo Dailey presents to the office this afternoon at the referral of Dr. Ball, with complaints of discomfort to the left foot.  States having pain which he relates to numbness when wearing tight shoe gear.  States that with losing the laces in getting off his feet pain does improve.  He reports that pain is intermittent in nature.  He rates 0/10 pain currently.  He denies any recent trauma or injury . Patient's PMD is Kitty Ball MD.     Review of patient's allergies indicates:  No Known Allergies    Past Medical History:   Diagnosis Date    Allergic rhinitis     BPH (benign prostatic hyperplasia)     Colon polyps     Hyperlipidemia     Tennis elbow     Tinea versicolor        Family History   Problem Relation Age of Onset    Hyperlipidemia Father         Protate cancer age 75    Hearing loss Father     Prostate cancer Father     Macular degeneration Father     Dementia Father     Prostate cancer Paternal Grandfather     Hyperlipidemia Mother         age 92 w mild dementia    Dementia Mother     Seizures Mother     Arthritis Mother         in the spine    Retinitis pigmentosa Sister     Cataracts Sister     Alzheimer's disease Maternal Aunt     Cataracts Maternal Aunt     Alzheimer's disease Maternal Aunt     Alzheimer's disease Maternal Uncle     Cataracts Maternal Uncle     Liver cancer Maternal Uncle     Cataracts Maternal Grandmother     Prostate cancer Paternal Uncle        Social History     Socioeconomic History    Marital status:    Tobacco Use    Smoking status: Never     Passive exposure: Never    Smokeless tobacco: Never   Substance and Sexual Activity    Alcohol use: Never     Comment: 1-2 times a month    Drug use: No    Sexual  activity: Yes     Partners: Female     Birth control/protection: None   Social History Narrative    The patient is , has 2 children and retired as a .               Social Determinants of Health     Financial Resource Strain: Low Risk     Difficulty of Paying Living Expenses: Not hard at all   Food Insecurity: No Food Insecurity    Worried About Running Out of Food in the Last Year: Never true    Ran Out of Food in the Last Year: Never true   Transportation Needs: No Transportation Needs    Lack of Transportation (Medical): No    Lack of Transportation (Non-Medical): No   Physical Activity: Sufficiently Active    Days of Exercise per Week: 4 days    Minutes of Exercise per Session: 50 min   Stress: No Stress Concern Present    Feeling of Stress : Only a little   Social Connections: Unknown    Frequency of Communication with Friends and Family: More than three times a week    Frequency of Social Gatherings with Friends and Family: Twice a week    Active Member of Clubs or Organizations: No    Attends Club or Organization Meetings: Never    Marital Status:    Housing Stability: Low Risk     Unable to Pay for Housing in the Last Year: No    Number of Places Lived in the Last Year: 1    Unstable Housing in the Last Year: No       Past Surgical History:   Procedure Laterality Date    CATARACT EXTRACTION W/  INTRAOCULAR LENS IMPLANT Right 12/07/2017    CATARACT EXTRACTION W/  INTRAOCULAR LENS IMPLANT Left 12/06/2018    COLONOSCOPY N/A 04/25/2017    Procedure: COLONOSCOPY;  Surgeon: Edilberto Perez MD;  Location: [facility];  Service: Endoscopy;  Laterality: N/A;    COLONOSCOPY N/A 08/11/2022    Procedure: COLONOSCOPY;  Surgeon: Les Benitez MD;  Location: [facility];  Service: Endoscopy;  Laterality: N/A;    EYE SURGERY  2 years ago    cataracts       Review of Systems       Objective:   Ht 6' (1.829 m)   Wt 67.1 kg (148 lb)   BMI 20.07 kg/m²     X-Ray Elbow Complete  Right  Narrative: EXAMINATION:  Five radiographic views of the RIGHT ELBOW.    CLINICAL HISTORY:  Pain in right elbow    TECHNIQUE:  Five radiographic views of the RIGHT ELBOW.    COMPARISON:  Right elbow radiograph 11/08/2011.    FINDINGS:  Five views of the right elbow demonstrate normal alignment.  There is no fracture.  There is no bony mass lesion.  There is no joint effusion.  There is no soft tissue swelling.  Impression: No acute abnormality of the right elbow.    Electronically signed by: Sumanth Michaels MD  Date:    06/05/2023  Time:    15:55       Physical Exam  LOWER EXTREMITY PHYSICAL EXAMINATION    VASCULAR: The right DP pulse is 2/4 and the left DP is 2/4. The right PT pulse is 2/4 and the left PT pulse is 2/4. Proximal to distal, warm to warm. No dependent rubor or elevation palor is noted. Capillary refill time is less than 3 seconds. Hair growth is appreciated to the dorsal foot and digits.    NEUROLOGY: Proprioception is intact. Sensation to light touch is intact. Protective sensation is intact via 5.07 [place] Pavel monofilament. Straight leg raise is negative. Negative Tinel's Sign and negative Valleix sign. No neurological sensations with compression of the area of Jacques's Nerve in the area of the Abductor Hallucis muscle belly. Upon palpation of the interspaces, there are no neurological sensations stated that radiate proximal or distal. Upon compression of the metatarsal heads from medial to lateral, no neurological sensations or symptoms are stated.    DERMATOLOGY: Skin is supple, dry and intact. No ecchymosis is noted. No hypertrophic skin formation. No erythema or cellulitis is noted.     ORTHOPEDIC :  Mild tenderness on palpation of the 1st metatarsal cuneiform joint.  Spurring is present.  There is crepitus with dorsal plantar translation of the 1st TMTJ.  With direct pressure, there is reproducible numbness associated with neuritis    Assessment:     1. Arthritis of foot    2. Neuritis         Plan:     Arthritis of foot    Neuritis        Thorough discussion is had with the patient today, concerning the diagnosis, its etiology, and the treatment algorithm at present.    We discussed pathology in etiology associated with 1st tarsometatarsal joint arthritis as relates the bone spurring present dorsally and plantarly causing increased pain discomfort.  We discuss that with tight ill-fitting shoes, there is dorsal compression of the dorsal cutaneous nerve causing the neuritis symptoms.        Future Appointments   Date Time Provider Department Center   6/26/2023  8:00 AM Yazmin Dash MD [facility] [address]   6/27/2023  8:00 AM Yazmin Dash MD [facility] [address]   9/12/2023  7:45 AM Chidi Matthew MD [facility]

## 2023-06-27 ENCOUNTER — LAB VISIT (OUTPATIENT)
Dept: LAB | Facility: HOSPITAL | Age: 63
End: 2023-06-27
Attending: FAMILY MEDICINE
Payer: COMMERCIAL

## 2023-06-27 ENCOUNTER — OFFICE VISIT (OUTPATIENT)
Dept: FAMILY MEDICINE | Facility: CLINIC | Age: 63
End: 2023-06-27
Payer: COMMERCIAL

## 2023-06-27 VITALS
HEART RATE: 75 BPM | WEIGHT: 148.81 LBS | DIASTOLIC BLOOD PRESSURE: 70 MMHG | SYSTOLIC BLOOD PRESSURE: 122 MMHG | BODY MASS INDEX: 20.16 KG/M2 | OXYGEN SATURATION: 98 % | TEMPERATURE: 97 F | HEIGHT: 72 IN

## 2023-06-27 DIAGNOSIS — E78.00 HYPERCHOLESTEREMIA: Chronic | ICD-10-CM

## 2023-06-27 DIAGNOSIS — Z12.5 PROSTATE CANCER SCREENING: ICD-10-CM

## 2023-06-27 DIAGNOSIS — Z00.00 PREVENTATIVE HEALTH CARE: Primary | ICD-10-CM

## 2023-06-27 DIAGNOSIS — N40.0 BENIGN PROSTATIC HYPERPLASIA, UNSPECIFIED WHETHER LOWER URINARY TRACT SYMPTOMS PRESENT: Chronic | ICD-10-CM

## 2023-06-27 DIAGNOSIS — Z00.00 PREVENTATIVE HEALTH CARE: ICD-10-CM

## 2023-06-27 DIAGNOSIS — G56.22 CUBITAL TUNNEL SYNDROME ON LEFT: ICD-10-CM

## 2023-06-27 PROBLEM — R53.1 DECREASED STRENGTH: Status: RESOLVED | Noted: 2023-04-27 | Resolved: 2023-06-27

## 2023-06-27 PROBLEM — G56.03 BILATERAL CARPAL TUNNEL SYNDROME: Status: RESOLVED | Noted: 2023-03-08 | Resolved: 2023-06-27

## 2023-06-27 PROBLEM — Z96.1 PSEUDOPHAKIA OF BOTH EYES: Status: RESOLVED | Noted: 2019-07-23 | Resolved: 2023-06-27

## 2023-06-27 PROBLEM — Z83.518 FAMILY HISTORY OF RETINITIS PIGMENTOSA: Status: RESOLVED | Noted: 2019-07-23 | Resolved: 2023-06-27

## 2023-06-27 PROBLEM — M25.60 DECREASED RANGE OF MOTION: Status: RESOLVED | Noted: 2023-04-27 | Resolved: 2023-06-27

## 2023-06-27 PROBLEM — R29.898 DECREASED GRIP STRENGTH OF LEFT HAND: Status: RESOLVED | Noted: 2023-04-10 | Resolved: 2023-06-27

## 2023-06-27 PROBLEM — Z78.9 DECREASED ACTIVITIES OF DAILY LIVING (ADL): Status: RESOLVED | Noted: 2023-04-10 | Resolved: 2023-06-27

## 2023-06-27 PROBLEM — Z12.11 COLON CANCER SCREENING: Status: RESOLVED | Noted: 2017-04-25 | Resolved: 2023-06-27

## 2023-06-27 LAB
ALBUMIN SERPL BCP-MCNC: 4.2 G/DL (ref 3.5–5.2)
ALP SERPL-CCNC: 56 U/L (ref 55–135)
ALT SERPL W/O P-5'-P-CCNC: 16 U/L (ref 10–44)
ANION GAP SERPL CALC-SCNC: 9 MMOL/L (ref 8–16)
AST SERPL-CCNC: 19 U/L (ref 10–40)
BASOPHILS # BLD AUTO: 0.05 K/UL (ref 0–0.2)
BASOPHILS NFR BLD: 0.9 % (ref 0–1.9)
BILIRUB SERPL-MCNC: 0.8 MG/DL (ref 0.1–1)
BUN SERPL-MCNC: 17 MG/DL (ref 8–23)
CALCIUM SERPL-MCNC: 9.7 MG/DL (ref 8.7–10.5)
CHLORIDE SERPL-SCNC: 105 MMOL/L (ref 95–110)
CHOLEST SERPL-MCNC: 233 MG/DL (ref 120–199)
CHOLEST/HDLC SERPL: 3.2 {RATIO} (ref 2–5)
CO2 SERPL-SCNC: 27 MMOL/L (ref 23–29)
COMPLEXED PSA SERPL-MCNC: 2.2 NG/ML (ref 0–4)
CREAT SERPL-MCNC: 1 MG/DL (ref 0.5–1.4)
DIFFERENTIAL METHOD: NORMAL
EOSINOPHIL # BLD AUTO: 0.1 K/UL (ref 0–0.5)
EOSINOPHIL NFR BLD: 1.3 % (ref 0–8)
ERYTHROCYTE [DISTWIDTH] IN BLOOD BY AUTOMATED COUNT: 12.4 % (ref 11.5–14.5)
EST. GFR  (NO RACE VARIABLE): >60 ML/MIN/1.73 M^2
GLUCOSE SERPL-MCNC: 97 MG/DL (ref 70–110)
HCT VFR BLD AUTO: 45.8 % (ref 40–54)
HDLC SERPL-MCNC: 73 MG/DL (ref 40–75)
HDLC SERPL: 31.3 % (ref 20–50)
HGB BLD-MCNC: 15.1 G/DL (ref 14–18)
IMM GRANULOCYTES # BLD AUTO: 0.01 K/UL (ref 0–0.04)
IMM GRANULOCYTES NFR BLD AUTO: 0.2 % (ref 0–0.5)
LDLC SERPL CALC-MCNC: 146.8 MG/DL (ref 63–159)
LYMPHOCYTES # BLD AUTO: 1.3 K/UL (ref 1–4.8)
LYMPHOCYTES NFR BLD: 23.3 % (ref 18–48)
MCH RBC QN AUTO: 29.4 PG (ref 27–31)
MCHC RBC AUTO-ENTMCNC: 33 G/DL (ref 32–36)
MCV RBC AUTO: 89 FL (ref 82–98)
MONOCYTES # BLD AUTO: 0.5 K/UL (ref 0.3–1)
MONOCYTES NFR BLD: 9.8 % (ref 4–15)
NEUTROPHILS # BLD AUTO: 3.6 K/UL (ref 1.8–7.7)
NEUTROPHILS NFR BLD: 64.5 % (ref 38–73)
NONHDLC SERPL-MCNC: 160 MG/DL
NRBC BLD-RTO: 0 /100 WBC
PLATELET # BLD AUTO: 252 K/UL (ref 150–450)
PMV BLD AUTO: 10.6 FL (ref 9.2–12.9)
POTASSIUM SERPL-SCNC: 5 MMOL/L (ref 3.5–5.1)
PROT SERPL-MCNC: 6.9 G/DL (ref 6–8.4)
RBC # BLD AUTO: 5.14 M/UL (ref 4.6–6.2)
SODIUM SERPL-SCNC: 141 MMOL/L (ref 136–145)
TRIGL SERPL-MCNC: 66 MG/DL (ref 30–150)
TSH SERPL DL<=0.005 MIU/L-ACNC: 2.03 UIU/ML (ref 0.4–4)
WBC # BLD AUTO: 5.53 K/UL (ref 3.9–12.7)

## 2023-06-27 PROCEDURE — 3008F BODY MASS INDEX DOCD: CPT | Mod: CPTII,S$GLB,, | Performed by: FAMILY MEDICINE

## 2023-06-27 PROCEDURE — 1159F MED LIST DOCD IN RCRD: CPT | Mod: CPTII,S$GLB,, | Performed by: FAMILY MEDICINE

## 2023-06-27 PROCEDURE — 99396 PREV VISIT EST AGE 40-64: CPT | Mod: S$GLB,,, | Performed by: FAMILY MEDICINE

## 2023-06-27 PROCEDURE — 1159F PR MEDICATION LIST DOCUMENTED IN MEDICAL RECORD: ICD-10-PCS | Mod: CPTII,S$GLB,, | Performed by: FAMILY MEDICINE

## 2023-06-27 PROCEDURE — 3074F SYST BP LT 130 MM HG: CPT | Mod: CPTII,S$GLB,, | Performed by: FAMILY MEDICINE

## 2023-06-27 PROCEDURE — 3074F PR MOST RECENT SYSTOLIC BLOOD PRESSURE < 130 MM HG: ICD-10-PCS | Mod: CPTII,S$GLB,, | Performed by: FAMILY MEDICINE

## 2023-06-27 PROCEDURE — 3078F PR MOST RECENT DIASTOLIC BLOOD PRESSURE < 80 MM HG: ICD-10-PCS | Mod: CPTII,S$GLB,, | Performed by: FAMILY MEDICINE

## 2023-06-27 PROCEDURE — 84443 ASSAY THYROID STIM HORMONE: CPT | Performed by: FAMILY MEDICINE

## 2023-06-27 PROCEDURE — 84153 ASSAY OF PSA TOTAL: CPT | Performed by: FAMILY MEDICINE

## 2023-06-27 PROCEDURE — 99396 PR PREVENTIVE VISIT,EST,40-64: ICD-10-PCS | Mod: S$GLB,,, | Performed by: FAMILY MEDICINE

## 2023-06-27 PROCEDURE — 1160F RVW MEDS BY RX/DR IN RCRD: CPT | Mod: CPTII,S$GLB,, | Performed by: FAMILY MEDICINE

## 2023-06-27 PROCEDURE — 80061 LIPID PANEL: CPT | Performed by: FAMILY MEDICINE

## 2023-06-27 PROCEDURE — 99999 PR PBB SHADOW E&M-EST. PATIENT-LVL III: CPT | Mod: PBBFAC,,, | Performed by: FAMILY MEDICINE

## 2023-06-27 PROCEDURE — 1160F PR REVIEW ALL MEDS BY PRESCRIBER/CLIN PHARMACIST DOCUMENTED: ICD-10-PCS | Mod: CPTII,S$GLB,, | Performed by: FAMILY MEDICINE

## 2023-06-27 PROCEDURE — 85025 COMPLETE CBC W/AUTO DIFF WBC: CPT | Performed by: FAMILY MEDICINE

## 2023-06-27 PROCEDURE — 99999 PR PBB SHADOW E&M-EST. PATIENT-LVL III: ICD-10-PCS | Mod: PBBFAC,,, | Performed by: FAMILY MEDICINE

## 2023-06-27 PROCEDURE — 3008F PR BODY MASS INDEX (BMI) DOCUMENTED: ICD-10-PCS | Mod: CPTII,S$GLB,, | Performed by: FAMILY MEDICINE

## 2023-06-27 PROCEDURE — 80053 COMPREHEN METABOLIC PANEL: CPT | Performed by: FAMILY MEDICINE

## 2023-06-27 PROCEDURE — 3078F DIAST BP <80 MM HG: CPT | Mod: CPTII,S$GLB,, | Performed by: FAMILY MEDICINE

## 2023-06-27 PROCEDURE — 36415 COLL VENOUS BLD VENIPUNCTURE: CPT | Mod: PO | Performed by: FAMILY MEDICINE

## 2023-06-27 NOTE — PROGRESS NOTES
CHIEF COMPLAINT:  This is a 62-year-old male here for preventive health exam.       SUBJECTIVE: The patient has not been seen in 2 years. He is doing well without complaints except for brain fog and dysequilibrium. Patient has hyperlipidemia but discontinued atorvastatin 4-5 months ago.  He would consider resuming medication depending on lipid panel.  He has seborrheic dermatitis  He has a history of BPH without urinary obstruction. Both his father, grandfather and paternal uncle  had prostate cancer later in life. The patient was diagnosed with left cubital tunnel syndrome since last seen.  He has chronic back pain with lumbar radiculopathy. The patient exercises 3 times weekly at the [facility].     Eye exam May 2023.  Colonoscopy August 2022 due again in August 2027. Tdap November 2021. Shingrix series completed.     ROS:  GENERAL: Patient denies fever, chills, night sweats. Patient denies weight gain or loss. Patient denies anorexia, fatigue, weakness or swollen glands.  SKIN: Patient denies rash or hair loss.  HEENT: Patient denies sore throat, ear pain, hearing loss, nasal congestion, or runny nose. Patient denies visual disturbance, eye irritation or discharge.  LUNGS: Patient denies cough, wheeze or hemoptysis.  CARDIOVASCULAR: Patient denies chest pain, shortness of breath, palpitations, syncope or lower extremity edema.  GI: Patient denies abdominal pain, nausea, vomiting, diarrhea, constipation, blood in stool or melena.  GENITOURINARY: Patient denies dysuria, frequency, hematuria, nocturia, urgency or incontinence.  MUSCULOSKELETAL: Patient denies joint swelling, redness or warmth.  NEUROLOGIC: Patient denies headache, vertigo, paresthesias, weakness in limb, dysarthria, dysphagia or abnormality of gait.  PSYCHIATRIC: Patient denies anxiety, depression, or memory loss.     OBJECTIVE:   GENERAL: Well-developed well-nourished , white male alert and oriented x3, in no acute distress.   SKIN: Clear without rash.  Normal color and tone.  SKs on back.  HEENT: Eyes: Clear conjunctivae. Pupils equal reactive to light and accommodation. Ears: Clear TMs clear canals. Nose: Without congestion. Pharynx: Without injection or exudates.  NECK: Supple, normal range of motion. No masses, nodes or enlarged thyroid. No JVD. Carotids 2+ and equal. No bruits.  LUNGS: Clear to auscultation. Normal respiratory effort.  CARDIOVASCULAR: Regular rhythm, normal S1, S2 without murmur, gallop or rub.  BACK: No CVA or spinal tenderness.  ABDOMEN: Normal appearance. Active bowel sounds. Soft, nontender without mass or organomegaly. No rebound or guarding.  EXTREMITIES: Without cyanosis, clubbing or edema. Distal pulses 2+ and equal. Normal range of motion in all extremities. No joint effusion, erythema or warmth.  NEUROLOGIC: Cranial nerves II through XII without deficit. Motor strength equal bilaterally. Sensation normal to touch. Deep tendon reflexes 2+ and equal. Gait without abnormality. No tremor. Negative cerebellar signs.  AUNG:  Mild to moderately enlarged prostate, smooth, nontender. No masses. Anorectal exam: No masses, nontender. Heme negative stool x2.    ASSESSMENT:  1. Preventative health care    2. Hypercholesteremia    3. Benign prostatic hyperplasia, unspecified whether lower urinary tract symptoms present    4. Cubital tunnel syndrome on left    5. Prostate cancer screening      PLAN:  1. Exercise regularly.  2. Age-appropriate counseling.  3. Fasting lab.  4. Follow-up annually.    This note is generated with speech recognition software and is subject to transcription error and sound alike phrases that may be missed by proofreading.

## 2023-07-05 ENCOUNTER — PATIENT MESSAGE (OUTPATIENT)
Dept: FAMILY MEDICINE | Facility: CLINIC | Age: 63
End: 2023-07-05
Payer: COMMERCIAL

## 2023-07-12 ENCOUNTER — DOCUMENTATION ONLY (OUTPATIENT)
Dept: REHABILITATION | Facility: HOSPITAL | Age: 63
End: 2023-07-12
Payer: COMMERCIAL

## 2023-07-12 NOTE — PROGRESS NOTES
[facility]  Physical Therapy Discharge Note    Name: Kilo [facility] Number: [id number]    Therapy Diagnosis: decreased range of motion, decreased strength  Physician: No ref. provider found    Physician Orders: PT Eval and Treat  Medical Diagnosis from Referral: Foot pain, bilateral  Evaluation Date: 4/24/2023      Date of Last visit: 5/30/23  Total Visits Received: 6    ASSESSMENT      See daily notes    Discharge reason: Patient requested discharge    Discharge FOTO Score: see daily notes    Goals: see daily notes    PLAN   This patient is discharged from Physical Therapy      Melissa Loo PT

## 2023-07-17 ENCOUNTER — DOCUMENTATION ONLY (OUTPATIENT)
Dept: REHABILITATION | Facility: HOSPITAL | Age: 63
End: 2023-07-17
Payer: COMMERCIAL

## 2023-07-17 DIAGNOSIS — R20.0 NUMBNESS AND TINGLING IN LEFT HAND: Primary | ICD-10-CM

## 2023-07-17 DIAGNOSIS — R20.2 NUMBNESS AND TINGLING IN LEFT HAND: Primary | ICD-10-CM

## 2023-07-17 NOTE — PROGRESS NOTES
[facility]  Occupational Therapy Discharge Summary     Discharge Date: 7/17/2023   Name: Kilo Dailey  [facility] Number: [id number]    Therapy Diagnosis: No diagnosis found.  Physician: Kitty Ball MD     Physician Orders: Occupational Therapy to Evaluate and Treat  Medical Diagnosis: G56.00 (ICD-10-CM) - Carpal tunnel syndrome, unspecified laterality  Evaluation Date: 4/10/2023  Insurance Authorization Period Expiration: 4/10/2023 - 12/31/2023  Plan of Care Certification Period: 4/10/2023 - 7/10/2023    Date of Last visit: 5/25/2023  Total Visits Received: 11  Cancelled Visits: 0  No Show Visits: 0    Assessment    Assessment of Current Status: Patient made some improvements but was referred to Dr Avila for further assessment and procedure.      Goals:     Short Term Goals:  6 weeks  Progress   4/10/2023   Pain: Patient will demonstrate improved pain by reports of less than or equal to 4/10 worst pain on the verbal rating scale in order to progress toward maximal functional ability and improve quality of life. PC   Function: Patient will demonstrate improved function as indicated by a functional limitation score of less than or equal to 22 out of 100 on FOTO. PC   Mobility: Patient will improve AROM to 50% of stated goals, listed in objective measures above, in order to progress towards independence with functional activities.  PC   Strength: Patient will improve strength to 50% of stated goals, listed in objective measures above, in order to progress towards independence with functional activities.  PC   HEP: Patient will demonstrate independence with HEP in order to progress toward functional independence. PC      Long Term Goals:  12 weeks  Progress  4/10/2023   Pain: Patient will demonstrate improved pain by reports of less than or equal to 2/10 worst pain on the verbal rating scale in order to progress toward maximal functional ability and improve quality of life.   PC   Function:  Patient will demonstrate improved function as indicated by a functional limitation score of less than or equal to 21 out of 100 on FOTO. PC   Mobility: Patient will improve AROM to stated goals, listed in objective measures above, in order to return to maximal functional potential and improve quality of life. PC   Strength: Patient will improve strength to stated goals, listed in objective measures above, in order to improve functional independence and quality of life. PC   Patient will return to normal ADL's, IADL's, community involvement, recreational activities, and work-related activities with less than or equal to 2/10 pain and maximal function.  PC      Goals Key:  PC= Progressing/Continue;       PM= Partially Met;       Goal Met= Goal Met      DC= Discontinue    Discharge reason: Patient has not attended therapy since 5/25/2023    Plan   This patient is discharged from Occupational Therapy    Ladonna Tucker, OT ,OTD, OTR/L  7/17/2023

## 2023-07-20 ENCOUNTER — PATIENT MESSAGE (OUTPATIENT)
Dept: FAMILY MEDICINE | Facility: CLINIC | Age: 63
End: 2023-07-20
Payer: COMMERCIAL

## 2023-09-13 ENCOUNTER — OFFICE VISIT (OUTPATIENT)
Dept: OPHTHALMOLOGY | Facility: CLINIC | Age: 63
End: 2023-09-13
Payer: COMMERCIAL

## 2023-09-13 DIAGNOSIS — H04.123 DRY EYE SYNDROME, BILATERAL: ICD-10-CM

## 2023-09-13 DIAGNOSIS — H57.13 PAIN IN OR AROUND EYE, BILATERAL: ICD-10-CM

## 2023-09-13 DIAGNOSIS — H40.013 OPEN ANGLE WITH BORDERLINE FINDINGS OF BOTH EYES: Primary | ICD-10-CM

## 2023-09-13 DIAGNOSIS — Z96.1 PSEUDOPHAKIA OF BOTH EYES: ICD-10-CM

## 2023-09-13 PROCEDURE — 1160F PR REVIEW ALL MEDS BY PRESCRIBER/CLIN PHARMACIST DOCUMENTED: ICD-10-PCS | Mod: CPTII,S$GLB,, | Performed by: OPHTHALMOLOGY

## 2023-09-13 PROCEDURE — 99999 PR PBB SHADOW E&M-EST. PATIENT-LVL II: CPT | Mod: PBBFAC,,, | Performed by: OPHTHALMOLOGY

## 2023-09-13 PROCEDURE — 1159F PR MEDICATION LIST DOCUMENTED IN MEDICAL RECORD: ICD-10-PCS | Mod: CPTII,S$GLB,, | Performed by: OPHTHALMOLOGY

## 2023-09-13 PROCEDURE — 99999 PR PBB SHADOW E&M-EST. PATIENT-LVL II: ICD-10-PCS | Mod: PBBFAC,,, | Performed by: OPHTHALMOLOGY

## 2023-09-13 PROCEDURE — 99214 PR OFFICE/OUTPT VISIT, EST, LEVL IV, 30-39 MIN: ICD-10-PCS | Mod: S$GLB,,, | Performed by: OPHTHALMOLOGY

## 2023-09-13 PROCEDURE — 92133 POSTERIOR SEGMENT OCT OPTIC NERVE(OCULAR COHERENCE TOMOGRAPHY) - OU - BOTH EYES: ICD-10-PCS | Mod: S$GLB,,, | Performed by: OPHTHALMOLOGY

## 2023-09-13 PROCEDURE — 1159F MED LIST DOCD IN RCRD: CPT | Mod: CPTII,S$GLB,, | Performed by: OPHTHALMOLOGY

## 2023-09-13 PROCEDURE — 1160F RVW MEDS BY RX/DR IN RCRD: CPT | Mod: CPTII,S$GLB,, | Performed by: OPHTHALMOLOGY

## 2023-09-13 PROCEDURE — 92133 CPTRZD OPH DX IMG PST SGM ON: CPT | Mod: S$GLB,,, | Performed by: OPHTHALMOLOGY

## 2023-09-13 PROCEDURE — 99214 OFFICE O/P EST MOD 30 MIN: CPT | Mod: S$GLB,,, | Performed by: OPHTHALMOLOGY

## 2023-09-13 NOTE — PROGRESS NOTES
HPI    Yearly glaucoma suspect check (review GOCT)    Patient states that he has pain in his right eye when he rubs it. (4 or 5   on the pain scale)  Patient states that occasionally in the morning he has a hard time opening   the right eye. It is stuck to the eyelid. His right eye burns and hurts (8   on the pain scale)  No eye pain today    1. PCIOL OS 12/6/2018   YAG OD 9/13/22  PCIOL OD +18.5 SN60WF / CDE:16.69 / 12/7/17 BLOCK    Coag susp- based on c/d asym.     GCL 30 OU --- 08/04/21        H/O WET AMD OD -DAD  H/O DRY AMD OS -DAD  H/O RP-SI    Systane ultra prn OU  Fish oil  Last edited by Neetu Seth MA on 9/13/2023  1:06 PM.            Assessment /Plan     For exam results, see Encounter Report.      ICD-10-CM ICD-9-CM    1. Open angle with borderline findings of both eyes  H40.013 365.01 Posterior Segment OCT Optic Nerve- Both eyes    Doing well - intraocular pressure is within acceptable range relative to target pressure with no evidence of progression.   Continue current treatment.  Reviewed importance of continued compliance with treatment and follow up.       2. Pain in or around eye, bilateral  H57.13 379.91 No evidence of iritis on exam today  Patient states symptoms improve when he does not place pressure on eyes      3. Dry eye syndrome, bilateral  H04.123 375.15 Recommend he continue using ATs PRN      4. Pseudophakia of both eyes  Z96.1 V43.1 Monitor           Return to clinic 1 year with HVF 24-2 and GOCT

## 2023-10-04 ENCOUNTER — PATIENT MESSAGE (OUTPATIENT)
Dept: OPHTHALMOLOGY | Facility: CLINIC | Age: 63
End: 2023-10-04
Payer: COMMERCIAL

## 2023-10-05 ENCOUNTER — PATIENT MESSAGE (OUTPATIENT)
Dept: DERMATOLOGY | Facility: CLINIC | Age: 63
End: 2023-10-05
Payer: COMMERCIAL

## 2023-10-05 DIAGNOSIS — H16.229 KERATOCONJUNCT SICCA, NOT SPECIFIED AS SJOGREN'S, UNSP EYE: Primary | ICD-10-CM

## 2023-10-06 RX ORDER — VARENICLINE 0.03 MG/.05ML
0.03 SPRAY NASAL 2 TIMES DAILY
Qty: 4.2 ML | Refills: 3 | Status: SHIPPED | OUTPATIENT
Start: 2023-10-06

## 2024-05-08 ENCOUNTER — HOSPITAL ENCOUNTER (OUTPATIENT)
Dept: RADIOLOGY | Facility: HOSPITAL | Age: 64
Discharge: HOME | End: 2024-05-08
Attending: FAMILY MEDICINE
Payer: COMMERCIAL

## 2024-05-08 ENCOUNTER — OFFICE VISIT (OUTPATIENT)
Dept: PRIMARY CARE CLINIC | Facility: CLINIC | Age: 64
End: 2024-05-08
Payer: COMMERCIAL

## 2024-05-08 VITALS
RESPIRATION RATE: 17 BRPM | WEIGHT: 153.25 LBS | SYSTOLIC BLOOD PRESSURE: 112 MMHG | HEIGHT: 72 IN | OXYGEN SATURATION: 98 % | HEART RATE: 76 BPM | BODY MASS INDEX: 20.76 KG/M2 | TEMPERATURE: 97 F | DIASTOLIC BLOOD PRESSURE: 64 MMHG

## 2024-05-08 DIAGNOSIS — M19.072 ARTHRITIS OF LEFT FOOT: ICD-10-CM

## 2024-05-08 DIAGNOSIS — Z00.00 ROUTINE GENERAL MEDICAL EXAMINATION AT A HEALTH CARE FACILITY: ICD-10-CM

## 2024-05-08 DIAGNOSIS — Z12.5 PROSTATE CANCER SCREENING: ICD-10-CM

## 2024-05-08 DIAGNOSIS — M19.072 ARTHRITIS OF LEFT FOOT: Primary | ICD-10-CM

## 2024-05-08 DIAGNOSIS — E78.00 HYPERCHOLESTEREMIA: ICD-10-CM

## 2024-05-08 PROCEDURE — 73630 X-RAY EXAM OF FOOT: CPT | Mod: 26,LT,, | Performed by: RADIOLOGY

## 2024-05-08 PROCEDURE — 1159F MED LIST DOCD IN RCRD: CPT | Mod: CPTII,S$GLB,, | Performed by: FAMILY MEDICINE

## 2024-05-08 PROCEDURE — 99214 OFFICE O/P EST MOD 30 MIN: CPT | Mod: S$GLB,,, | Performed by: FAMILY MEDICINE

## 2024-05-08 PROCEDURE — 3078F DIAST BP <80 MM HG: CPT | Mod: CPTII,S$GLB,, | Performed by: FAMILY MEDICINE

## 2024-05-08 PROCEDURE — 3074F SYST BP LT 130 MM HG: CPT | Mod: CPTII,S$GLB,, | Performed by: FAMILY MEDICINE

## 2024-05-08 PROCEDURE — G2211 COMPLEX E/M VISIT ADD ON: HCPCS | Mod: S$GLB,,, | Performed by: FAMILY MEDICINE

## 2024-05-08 PROCEDURE — 3008F BODY MASS INDEX DOCD: CPT | Mod: CPTII,S$GLB,, | Performed by: FAMILY MEDICINE

## 2024-05-08 PROCEDURE — 73630 X-RAY EXAM OF FOOT: CPT | Mod: TC,LT

## 2024-05-08 PROCEDURE — 1160F RVW MEDS BY RX/DR IN RCRD: CPT | Mod: CPTII,S$GLB,, | Performed by: FAMILY MEDICINE

## 2024-05-08 PROCEDURE — 99999 PR PBB SHADOW E&M-EST. PATIENT-LVL V: CPT | Mod: PBBFAC,,, | Performed by: FAMILY MEDICINE

## 2024-05-08 NOTE — PROGRESS NOTES
Subjective:      Patient ID: Kilo Dailey is a 63 y.o. male.    Chief Complaint: Follow-up (Inflammation in the left foot )      Patient is a 63 y.o. male coming in today for persisting left foot inflammation.  Has f/u with Dr. Kendall, podiatry, for similar sx presentation. He has also gone through PT with only momentary sx relief. Denies wearing tight shoes or recent injury to left foot. Pt worsens with pressure. Discussed referrals and imaging for evaluation. No other health concern at this time.       1. Arthritis of left foot    2. Hypercholesteremia    3. Routine general medical examination at a health care facility    4. Prostate cancer screening       No questionnaires on file.    Pmh, Psh, Family Hx, Social Hx, HM updated in Epic Tabs today.   Review of Systems   Constitutional:  Negative for chills and fatigue.   HENT:  Negative for sore throat and trouble swallowing.    Respiratory:  Negative for cough and shortness of breath.    Cardiovascular:  Negative for chest pain and leg swelling.   Gastrointestinal:  Negative for abdominal pain, constipation, diarrhea and nausea.   Genitourinary:  Negative for difficulty urinating, dysuria and flank pain.   Musculoskeletal:  Negative for arthralgias and joint swelling.        + Left foot inflammation   Neurological:  Negative for dizziness and headaches.     Objective:     Vitals:    05/08/24 0950   BP: 112/64   BP Location: Left arm   Patient Position: Sitting   BP Method: Small (Manual)   Pulse: 76   Resp: 17   Temp: 97.2 °F (36.2 °C)   TempSrc: Tympanic   SpO2: 98%   Weight: 69.5 kg (153 lb 3.5 oz)   Height: 6' (1.829 m)     Wt Readings from Last 10 Encounters:   05/08/24 69.5 kg (153 lb 3.5 oz)   06/27/23 67.5 kg (148 lb 13 oz)   06/09/23 67.1 kg (148 lb)   06/05/23 67.1 kg (148 lb)   05/23/23 67.3 kg (148 lb 5.9 oz)   03/30/23 68.9 kg (152 lb)   03/29/23 68.9 kg (152 lb)   03/08/23 67.6 kg (149 lb)   08/11/22 67.8 kg (149 lb 5.8 oz)   06/15/22 68.1 kg  "(150 lb 2.1 oz)     Physical Exam  Vitals and nursing note reviewed.   Constitutional:       General: He is awake.      Appearance: He is well-developed and well-groomed.   Pulmonary:      Effort: No accessory muscle usage.   Musculoskeletal:      Right foot: Normal range of motion. No foot drop or crepitus.      Left foot: Normal range of motion. Tenderness present. No foot drop or crepitus.   Feet:      Right foot:      Skin integrity: No erythema.      Left foot:      Skin integrity: No erythema.   Psychiatric:         Attention and Perception: Attention normal.         Speech: Speech normal.         Behavior: Behavior is cooperative.         Assessment:     1. Arthritis of left foot    2. Hypercholesteremia    3. Routine general medical examination at a health care facility    4. Prostate cancer screening        Plan:   Kilo Ruiz" was seen today for follow-up.    Diagnoses and all orders for this visit:    Arthritis of left foot  -     Ambulatory referral/consult to Podiatry; Future  -     X-Ray Foot Complete Left; Future    Hypercholesteremia  -     TSH; Future  -     Hemoglobin A1C; Future  -     Lipid Panel; Future  -     Comprehensive Metabolic Panel; Future    Routine general medical examination at a health care facility  -     TSH; Future  -     Hemoglobin A1C; Future  -     Lipid Panel; Future  -     Comprehensive Metabolic Panel; Future  -     CBC Auto Differential; Future  -     PSA, Screening; Future    Prostate cancer screening  -     PSA, Screening; Future      Left foot pain is persisting and now hurt even with walking barefoot/non-tight shoes. Sx consistent with foot arthritis.   Foot X-ray ordered to assess for foot arthritis.   Referral given to podiatry for further assessment and treatment.   Advised on OTC lidocaine patches for pain treatment.   Instructed to f/u if sx persist or worsen on foot and in July for annual with my LORE and labs prior     Patient Instructions        Follow up if " symptoms worsen or fail to improve, for f/u physical Payam Sevilla NP per dr ron.      LABS:   Lab Results   Component Value Date    HGBA1C 5.3 06/16/2022      Lab Results   Component Value Date    CHOL 233 (H) 06/27/2023    CHOL 179 06/16/2022    CHOL 191 07/27/2021     Lab Results   Component Value Date    LDLCALC 146.8 06/27/2023    LDLCALC 98.2 06/16/2022    LDLCALC 105.4 07/27/2021     Lab Results   Component Value Date    WBC 5.53 06/27/2023    HGB 15.1 06/27/2023    HCT 45.8 06/27/2023     06/27/2023    CHOL 233 (H) 06/27/2023    TRIG 66 06/27/2023    HDL 73 06/27/2023    ALT 16 06/27/2023    AST 19 06/27/2023     06/27/2023    K 5.0 06/27/2023     06/27/2023    CREATININE 1.0 06/27/2023    BUN 17 06/27/2023    CO2 27 06/27/2023    TSH 2.032 06/27/2023    PSA 2.2 06/27/2023    HGBA1C 5.3 06/16/2022       The 10-year ASCVD risk score (Jaden DK, et al., 2019) is: 8.4%    Values used to calculate the score:      Age: 63 years      Sex: Male      Is Non- : No      Diabetic: No      Tobacco smoker: No      Systolic Blood Pressure: 112 mmHg      Is BP treated: No      HDL Cholesterol: 73 mg/dL      Total Cholesterol: 233 mg/dL  X-Ray Foot Complete Left  Narrative: EXAMINATION:  XR FOOT COMPLETE 3 VIEW LEFT    CLINICAL HISTORY:  .  Primary osteoarthritis, left ankle and foot    TECHNIQUE:  AP, lateral and oblique views of the left foot were performed.    COMPARISON:  None    FINDINGS:  There is no radiographic evidence of acute osseous, articular, or soft tissue abnormality.  Joint spaces are well preserved.  No erosive changes demonstrated.  Impression: No acute findings    Electronically signed by: Bebeto London MD  Date:    05/08/2024  Time:    11:05      Visit today included increased complexity associated with the care of the episodic problem : left foot pain  and managing the longitudinal care of the patient due to the serious and/or complex managed problem(s)  noted in the diagnosis section.     Scribe Attestation:   I, Bhargav Arzate, am scribing for, and in the presence of, Dr. Kitty Ball MD. I performed the above scribed service and the documentation accurately describes the services I performed. I attest to the accuracy of the note.    I, Dr. Kitty Ball MD, reviewed documentation as scribed above. I personally performed the services described in this documentation.  I agree that the record reflects my personal performance and is accurate and complete. Kitty Ball MD.    05/08/2024

## 2024-05-08 NOTE — PROGRESS NOTES
Xrays show no significant arthritis or any fractures noted. Please follow up with podiatry as we discussed to further evaluate your foot pain. You can try the lidocaine patches in the interim as well. Kitty Ball MD  
2020 negative

## 2024-05-09 ENCOUNTER — LAB VISIT (OUTPATIENT)
Dept: LAB | Facility: HOSPITAL | Age: 64
End: 2024-05-09
Attending: FAMILY MEDICINE
Payer: COMMERCIAL

## 2024-05-09 DIAGNOSIS — Z12.5 PROSTATE CANCER SCREENING: ICD-10-CM

## 2024-05-09 DIAGNOSIS — E78.00 HYPERCHOLESTEREMIA: ICD-10-CM

## 2024-05-09 DIAGNOSIS — Z00.00 ROUTINE GENERAL MEDICAL EXAMINATION AT A HEALTH CARE FACILITY: ICD-10-CM

## 2024-05-09 LAB
ALBUMIN SERPL BCP-MCNC: 4.1 G/DL (ref 3.5–5.2)
ALP SERPL-CCNC: 59 U/L (ref 55–135)
ALT SERPL W/O P-5'-P-CCNC: 17 U/L (ref 10–44)
ANION GAP SERPL CALC-SCNC: 10 MMOL/L (ref 8–16)
AST SERPL-CCNC: 20 U/L (ref 10–40)
BASOPHILS # BLD AUTO: 0.04 K/UL (ref 0–0.2)
BASOPHILS NFR BLD: 0.7 % (ref 0–1.9)
BILIRUB SERPL-MCNC: 0.9 MG/DL (ref 0.1–1)
BUN SERPL-MCNC: 18 MG/DL (ref 8–23)
CALCIUM SERPL-MCNC: 9.7 MG/DL (ref 8.7–10.5)
CHLORIDE SERPL-SCNC: 104 MMOL/L (ref 95–110)
CHOLEST SERPL-MCNC: 245 MG/DL (ref 120–199)
CHOLEST/HDLC SERPL: 3.6 {RATIO} (ref 2–5)
CO2 SERPL-SCNC: 25 MMOL/L (ref 23–29)
COMPLEXED PSA SERPL-MCNC: 2 NG/ML (ref 0–4)
CREAT SERPL-MCNC: 0.9 MG/DL (ref 0.5–1.4)
DIFFERENTIAL METHOD BLD: NORMAL
EOSINOPHIL # BLD AUTO: 0.2 K/UL (ref 0–0.5)
EOSINOPHIL NFR BLD: 3.4 % (ref 0–8)
ERYTHROCYTE [DISTWIDTH] IN BLOOD BY AUTOMATED COUNT: 12.3 % (ref 11.5–14.5)
EST. GFR  (NO RACE VARIABLE): >60 ML/MIN/1.73 M^2
ESTIMATED AVG GLUCOSE: 103 MG/DL (ref 68–131)
GLUCOSE SERPL-MCNC: 99 MG/DL (ref 70–110)
HBA1C MFR BLD: 5.2 % (ref 4–5.6)
HCT VFR BLD AUTO: 48.1 % (ref 40–54)
HDLC SERPL-MCNC: 69 MG/DL (ref 40–75)
HDLC SERPL: 28.2 % (ref 20–50)
HGB BLD-MCNC: 15.7 G/DL (ref 14–18)
IMM GRANULOCYTES # BLD AUTO: 0.01 K/UL (ref 0–0.04)
IMM GRANULOCYTES NFR BLD AUTO: 0.2 % (ref 0–0.5)
LDLC SERPL CALC-MCNC: 157 MG/DL (ref 63–159)
LYMPHOCYTES # BLD AUTO: 1.3 K/UL (ref 1–4.8)
LYMPHOCYTES NFR BLD: 23.1 % (ref 18–48)
MCH RBC QN AUTO: 29.7 PG (ref 27–31)
MCHC RBC AUTO-ENTMCNC: 32.6 G/DL (ref 32–36)
MCV RBC AUTO: 91 FL (ref 82–98)
MONOCYTES # BLD AUTO: 0.6 K/UL (ref 0.3–1)
MONOCYTES NFR BLD: 10.6 % (ref 4–15)
NEUTROPHILS # BLD AUTO: 3.5 K/UL (ref 1.8–7.7)
NEUTROPHILS NFR BLD: 62 % (ref 38–73)
NONHDLC SERPL-MCNC: 176 MG/DL
NRBC BLD-RTO: 0 /100 WBC
PLATELET # BLD AUTO: 265 K/UL (ref 150–450)
PMV BLD AUTO: 10.4 FL (ref 9.2–12.9)
POTASSIUM SERPL-SCNC: 5 MMOL/L (ref 3.5–5.1)
PROT SERPL-MCNC: 7.1 G/DL (ref 6–8.4)
RBC # BLD AUTO: 5.28 M/UL (ref 4.6–6.2)
SODIUM SERPL-SCNC: 139 MMOL/L (ref 136–145)
TRIGL SERPL-MCNC: 95 MG/DL (ref 30–150)
TSH SERPL DL<=0.005 MIU/L-ACNC: 1.86 UIU/ML (ref 0.4–4)
WBC # BLD AUTO: 5.59 K/UL (ref 3.9–12.7)

## 2024-05-09 PROCEDURE — 84443 ASSAY THYROID STIM HORMONE: CPT | Performed by: FAMILY MEDICINE

## 2024-05-09 PROCEDURE — 80061 LIPID PANEL: CPT | Performed by: FAMILY MEDICINE

## 2024-05-09 PROCEDURE — 36415 COLL VENOUS BLD VENIPUNCTURE: CPT | Mod: PN | Performed by: FAMILY MEDICINE

## 2024-05-09 PROCEDURE — 83036 HEMOGLOBIN GLYCOSYLATED A1C: CPT | Performed by: FAMILY MEDICINE

## 2024-05-09 PROCEDURE — 85025 COMPLETE CBC W/AUTO DIFF WBC: CPT | Performed by: FAMILY MEDICINE

## 2024-05-09 PROCEDURE — 84153 ASSAY OF PSA TOTAL: CPT | Performed by: FAMILY MEDICINE

## 2024-05-09 PROCEDURE — 80053 COMPREHEN METABOLIC PANEL: CPT | Performed by: FAMILY MEDICINE

## 2024-05-14 ENCOUNTER — TELEPHONE (OUTPATIENT)
Dept: PRIMARY CARE CLINIC | Facility: CLINIC | Age: 64
End: 2024-05-14
Payer: COMMERCIAL

## 2024-05-14 NOTE — PROGRESS NOTES
Results released to patient portal. Needs to discuss cholesterol medication. Can setup appt with Payam Sevilla NP to review cholesterol labs.
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
## 2024-06-07 ENCOUNTER — PATIENT MESSAGE (OUTPATIENT)
Dept: PRIMARY CARE CLINIC | Facility: CLINIC | Age: 64
End: 2024-06-07
Payer: COMMERCIAL

## 2024-06-11 ENCOUNTER — OFFICE VISIT (OUTPATIENT)
Dept: DERMATOLOGY | Facility: CLINIC | Age: 64
End: 2024-06-11
Payer: COMMERCIAL

## 2024-06-11 VITALS — BODY MASS INDEX: 20.32 KG/M2 | HEIGHT: 72 IN | WEIGHT: 150 LBS

## 2024-06-11 DIAGNOSIS — L21.9 SEBORRHEIC DERMATITIS: Primary | ICD-10-CM

## 2024-06-11 DIAGNOSIS — L82.1 SEBORRHEIC KERATOSIS: ICD-10-CM

## 2024-06-11 DIAGNOSIS — D18.01 HEMANGIOMA OF SKIN: ICD-10-CM

## 2024-06-11 PROCEDURE — 99999 PR PBB SHADOW E&M-EST. PATIENT-LVL III: CPT | Mod: PBBFAC,,, | Performed by: DERMATOLOGY

## 2024-06-11 PROCEDURE — 1159F MED LIST DOCD IN RCRD: CPT | Mod: CPTII,S$GLB,, | Performed by: DERMATOLOGY

## 2024-06-11 PROCEDURE — 3008F BODY MASS INDEX DOCD: CPT | Mod: CPTII,S$GLB,, | Performed by: DERMATOLOGY

## 2024-06-11 PROCEDURE — 3044F HG A1C LEVEL LT 7.0%: CPT | Mod: CPTII,S$GLB,, | Performed by: DERMATOLOGY

## 2024-06-11 PROCEDURE — 99213 OFFICE O/P EST LOW 20 MIN: CPT | Mod: S$GLB,,, | Performed by: DERMATOLOGY

## 2024-06-11 PROCEDURE — 1160F RVW MEDS BY RX/DR IN RCRD: CPT | Mod: CPTII,S$GLB,, | Performed by: DERMATOLOGY

## 2024-06-11 RX ORDER — CLOBETASOL PROPIONATE 0.46 MG/ML
SOLUTION TOPICAL
Qty: 50 ML | Refills: 11 | Status: SHIPPED | OUTPATIENT
Start: 2024-06-11

## 2024-06-11 NOTE — PROGRESS NOTES
Subjective:      Patient ID:  Kilo Dailey is a 63 y.o. male who presents for   Chief Complaint   Patient presents with    Medication Refill     For scalp      Hx of rosacea, seb derm, SK, last seen on 12/13/22.  He states seb derm has improved with clobetasol solution, ketoconazole shampoo, cortisone-10.  He wishes to have refills.  Currently stable.         Review of Systems   Constitutional:  Negative for fever and chills.   Gastrointestinal:  Negative for nausea and vomiting.   Skin:  Positive for activity-related sunscreen use. Negative for daily sunscreen use and recent sunburn.   Hematologic/Lymphatic: Does not bruise/bleed easily.       Objective:   Physical Exam   Constitutional: He appears well-developed and well-nourished. No distress.   Neurological: He is alert and oriented to person, place, and time. He is not disoriented.   Psychiatric: He has a normal mood and affect.   Skin:   Areas Examined (abnormalities noted in diagram):   Head / Face Inspection Performed  Neck Inspection Performed  RUE Inspected  LUE Inspection Performed  Nails and Digits Inspection Performed                 Diagram Legend     Erythematous scaling macule/papule c/w actinic keratosis       Vascular papule c/w angioma      Pigmented verrucoid papule/plaque c/w seborrheic keratosis      Yellow umbilicated papule c/w sebaceous hyperplasia      Irregularly shaped tan macule c/w lentigo     1-2 mm smooth white papules consistent with Milia      Movable subcutaneous cyst with punctum c/w epidermal inclusion cyst      Subcutaneous movable cyst c/w pilar cyst      Firm pink to brown papule c/w dermatofibroma      Pedunculated fleshy papule(s) c/w skin tag(s)      Evenly pigmented macule c/w junctional nevus     Mildly variegated pigmented, slightly irregular-bordered macule c/w mildly atypical nevus      Flesh colored to evenly pigmented papule c/w intradermal nevus       Pink pearly papule/plaque c/w basal cell carcinoma       Erythematous hyperkeratotic cursted plaque c/w SCC      Surgical scar with no sign of skin cancer recurrence      Open and closed comedones      Inflammatory papules and pustules      Verrucoid papule consistent consistent with wart     Erythematous eczematous patches and plaques     Dystrophic onycholytic nail with subungual debris c/w onychomycosis     Umbilicated papule    Erythematous-base heme-crusted tan verrucoid plaque consistent with inflamed seborrheic keratosis     Erythematous Silvery Scaling Plaque c/w Psoriasis     See annotation      Assessment / Plan:        Seborrheic dermatitis  -     clobetasoL (TEMOVATE) 0.05 % external solution; Apply to affected areas of scalp 1-2 times daily as needed for itch  Dispense: 50 mL; Refill: 11  -     continue ketoconazole shampoo, clobetasol solution, and OTC HTC prn. The patient acknowledged understanding.     Seborrheic keratosis  Reassurance given. Discussed diagnosis and that lesions are benign.  AAD handout given.     Hemangioma of skin  Reassurance given.  Lesions are benign.             Follow up in about 1 year (around 6/11/2025).

## 2024-06-14 ENCOUNTER — OFFICE VISIT (OUTPATIENT)
Dept: OPHTHALMOLOGY | Facility: CLINIC | Age: 64
End: 2024-06-14
Payer: COMMERCIAL

## 2024-06-14 ENCOUNTER — PATIENT MESSAGE (OUTPATIENT)
Dept: OPHTHALMOLOGY | Facility: CLINIC | Age: 64
End: 2024-06-14

## 2024-06-14 DIAGNOSIS — H15.101 EPISCLERITIS OF RIGHT EYE: Primary | ICD-10-CM

## 2024-06-14 PROCEDURE — 1159F MED LIST DOCD IN RCRD: CPT | Mod: CPTII,S$GLB,, | Performed by: OPHTHALMOLOGY

## 2024-06-14 PROCEDURE — 3044F HG A1C LEVEL LT 7.0%: CPT | Mod: CPTII,S$GLB,, | Performed by: OPHTHALMOLOGY

## 2024-06-14 PROCEDURE — 1160F RVW MEDS BY RX/DR IN RCRD: CPT | Mod: CPTII,S$GLB,, | Performed by: OPHTHALMOLOGY

## 2024-06-14 PROCEDURE — 99999 PR PBB SHADOW E&M-EST. PATIENT-LVL III: CPT | Mod: PBBFAC,,, | Performed by: OPHTHALMOLOGY

## 2024-06-14 PROCEDURE — 99213 OFFICE O/P EST LOW 20 MIN: CPT | Mod: S$GLB,,, | Performed by: OPHTHALMOLOGY

## 2024-06-14 NOTE — PROGRESS NOTES
HPI     Eye Pain     Additional comments: Pt reports w/ right eye pain that comes and goes.   States he is wanting to make sure the eye pressure is not up. States the   pain is about a 3 or a 4 when he has it and he has taken Ibuprofen or   Aleve that seems to help.      States he is also using Systane Ultra as needed for dry eyes.           Comments    1. PCIOL OS 12/6/2018   YAG OD 9/13/22  PCIOL OD +18.5 SN60WF / CDE:16.69 / 12/7/17 BLOCK  2. Coag susp- based on c/d asym.   GCL 30 OU --- 08/04/21  3. Fhx of RP (sister)        H/O WET AMD OD -DAD  H/O DRY AMD OS -DAD  H/O RP-SI    Systane ultra prn OU  Fish oil             Last edited by Abby Del Angel on 6/14/2024  1:48 PM.            Assessment /Plan     For exam results, see Encounter Report.    Episcleritis of right eye  Has history of recurrent bouts of eye redness/pain. No intraocular inflammation. Start Ibuprofen 600mg daily for 7 days. RTC if symptoms worsen.

## 2024-06-20 ENCOUNTER — PATIENT MESSAGE (OUTPATIENT)
Dept: PRIMARY CARE CLINIC | Facility: CLINIC | Age: 64
End: 2024-06-20
Payer: COMMERCIAL

## 2024-06-27 ENCOUNTER — OFFICE VISIT (OUTPATIENT)
Dept: PRIMARY CARE CLINIC | Facility: CLINIC | Age: 64
End: 2024-06-27
Payer: COMMERCIAL

## 2024-06-27 ENCOUNTER — PATIENT MESSAGE (OUTPATIENT)
Dept: PRIMARY CARE CLINIC | Facility: CLINIC | Age: 64
End: 2024-06-27

## 2024-06-27 DIAGNOSIS — N40.0 BENIGN PROSTATIC HYPERPLASIA, UNSPECIFIED WHETHER LOWER URINARY TRACT SYMPTOMS PRESENT: Chronic | ICD-10-CM

## 2024-06-27 DIAGNOSIS — E78.5 HYPERLIPIDEMIA LDL GOAL <130: Primary | ICD-10-CM

## 2024-06-27 DIAGNOSIS — L21.8 SEBORRHEA CORPORIS: ICD-10-CM

## 2024-06-27 DIAGNOSIS — H40.013 OPEN ANGLE WITH BORDERLINE FINDINGS OF BOTH EYES: ICD-10-CM

## 2024-06-27 PROCEDURE — 1159F MED LIST DOCD IN RCRD: CPT | Mod: CPTII,95,, | Performed by: NURSE PRACTITIONER

## 2024-06-27 PROCEDURE — G2211 COMPLEX E/M VISIT ADD ON: HCPCS | Mod: 95,,, | Performed by: NURSE PRACTITIONER

## 2024-06-27 PROCEDURE — 3044F HG A1C LEVEL LT 7.0%: CPT | Mod: CPTII,95,, | Performed by: NURSE PRACTITIONER

## 2024-06-27 PROCEDURE — 1160F RVW MEDS BY RX/DR IN RCRD: CPT | Mod: CPTII,95,, | Performed by: NURSE PRACTITIONER

## 2024-06-27 PROCEDURE — 99213 OFFICE O/P EST LOW 20 MIN: CPT | Mod: 95,,, | Performed by: NURSE PRACTITIONER

## 2024-06-27 RX ORDER — LOVASTATIN 10 MG/1
TABLET ORAL
Qty: 90 TABLET | Refills: 3 | Status: SHIPPED | OUTPATIENT
Start: 2024-06-27

## 2024-06-27 NOTE — PROGRESS NOTES
Assessment & Plan  Problem List Items Addressed This Visit          Ophtho    Open angle with borderline findings of both eyes  -Stable; Monitor       Derm    Seborrhea corporis  -The current medical regimen is effective;  continue present plan and medications.         Cardiac/Vascular    Hyperlipidemia LDL goal <130 - Primary    Relevant Medications    lovastatin (MEVACOR) 10 MG tablet    Other Relevant Orders    Lipid Panel       Renal/    BPH (benign prostatic hyperplasia) (Chronic)  -Stable; Monitor         Health Maintenance reviewed: Deferred at this time.    The patient location is:  Patient appears to be home.   The chief complaint leading to consultation is: noted below  Visit type: Virtual visit with synchronous audio and video  Total time spent with patient: 20+ minutes  Each patient to whom he or she provides medical services by telemedicine is:  (1) informed of the relationship between the physician and patient and the respective role of any other health care provider with respect to management of the patient; and (2) notified that he or she may decline to receive medical services by telemedicine and may withdraw from such care at any time.    Follow-up: Start Lovastatin 10 mg every other night   2. Repeat Lipid Panel in approx 10 weeks at [facility] location     Sincerely,  Payam Sevilla NP     Chief Complaint  No chief complaint on file.      [facility]  Kilo Dailey is a 63 y.o. male with multiple medical diagnoses as listed in the medical history and problem list that presents for HLD via virtual visit. This patient is new to me.     HLD: LDL with a mild increase at 157 however, still within recommended range. Goal is to have LDL trend below 130. Over all healthy presentation and with stable metabolic function. Regular aerobic exercise and healthy dieting for the most a part. Does engage in late night snacking at sometimes. Tooklow dose Lipitor in the past that may or may not induced joint pain/muscle  ache. No acute concerns today.         ROS  Review of Systems   Constitutional:  Negative for activity change and unexpected weight change.   HENT:  Negative for hearing loss, rhinorrhea and trouble swallowing.    Eyes:  Negative for discharge and visual disturbance.   Respiratory:  Negative for chest tightness and wheezing.    Cardiovascular:  Negative for chest pain and palpitations.   Gastrointestinal:  Negative for blood in stool, constipation, diarrhea and vomiting.   Endocrine: Positive for polydipsia. Negative for polyuria.   Genitourinary:  Negative for difficulty urinating, hematuria and urgency.   Musculoskeletal:  Negative for arthralgias, joint swelling and neck pain.   Neurological:  Negative for weakness and headaches.   Psychiatric/Behavioral:  Negative for confusion and dysphoric mood.            Physical Exam  Physical Exam  Constitutional:       Appearance: Normal appearance.   HENT:      Head: Normocephalic and atraumatic.   Pulmonary:      Effort: Pulmonary effort is normal.   Neurological:      Mental Status: He is alert and oriented to person, place, and time.   Psychiatric:         Mood and Affect: Mood normal.

## 2024-07-23 ENCOUNTER — PATIENT MESSAGE (OUTPATIENT)
Dept: PRIMARY CARE CLINIC | Facility: CLINIC | Age: 64
End: 2024-07-23
Payer: COMMERCIAL

## 2024-09-04 ENCOUNTER — LAB VISIT (OUTPATIENT)
Dept: LAB | Facility: HOSPITAL | Age: 64
End: 2024-09-04
Attending: NURSE PRACTITIONER
Payer: COMMERCIAL

## 2024-09-04 DIAGNOSIS — E78.5 HYPERLIPIDEMIA LDL GOAL <130: ICD-10-CM

## 2024-09-04 LAB
CHOLEST SERPL-MCNC: 209 MG/DL (ref 120–199)
CHOLEST/HDLC SERPL: 3.1 {RATIO} (ref 2–5)
HDLC SERPL-MCNC: 67 MG/DL (ref 40–75)
HDLC SERPL: 32.1 % (ref 20–50)
LDLC SERPL CALC-MCNC: 128.8 MG/DL (ref 63–159)
NONHDLC SERPL-MCNC: 142 MG/DL
TRIGL SERPL-MCNC: 66 MG/DL (ref 30–150)

## 2024-09-04 PROCEDURE — 80061 LIPID PANEL: CPT | Performed by: NURSE PRACTITIONER

## 2024-09-04 PROCEDURE — 36415 COLL VENOUS BLD VENIPUNCTURE: CPT | Mod: PN | Performed by: NURSE PRACTITIONER

## 2024-11-22 ENCOUNTER — OFFICE VISIT (OUTPATIENT)
Dept: OPHTHALMOLOGY | Facility: CLINIC | Age: 64
End: 2024-11-22
Payer: MEDICARE

## 2024-11-22 DIAGNOSIS — H40.013 OPEN ANGLE WITH BORDERLINE FINDINGS OF BOTH EYES: Primary | ICD-10-CM

## 2024-11-22 DIAGNOSIS — H57.13 PAIN IN OR AROUND EYE, BILATERAL: ICD-10-CM

## 2024-11-22 DIAGNOSIS — Z96.1 PSEUDOPHAKIA OF BOTH EYES: ICD-10-CM

## 2024-11-22 PROCEDURE — 99212 OFFICE O/P EST SF 10 MIN: CPT | Mod: PBBFAC | Performed by: OPHTHALMOLOGY

## 2024-11-22 PROCEDURE — 99999 PR PBB SHADOW E&M-EST. PATIENT-LVL II: CPT | Mod: PBBFAC,,, | Performed by: OPHTHALMOLOGY

## 2024-11-22 NOTE — PROGRESS NOTES
HPI     Annual Exam            Comments: Pt here for annual   Pt denies any changes with OU va   No pain   Pt using systane prn ou           Comments    1. PCIOL OS 12/6/2018   YAG OD 9/13/22  PCIOL OD +18.5 SN60WF / CDE:16.69 / 12/7/17 BLOCK  2. Coag susp- based on c/d asym.   GCL 30 OU --- 08/04/21  3. Fhx of RP (sister)        H/O WET AMD OD -DAD  H/O DRY AMD OS -DAD  H/O RP-SI    Systane ultra as needed to both eyes  Fish oil             Last edited by Miguel Troncoso on 11/22/2024 10:59 AM.            Assessment /Plan     For exam results, see Encounter Report.      ICD-10-CM ICD-9-CM    1. Open angle with borderline findings of both eyes  H40.013 365.01 Posterior Segment OCT Optic Nerve- Both eyes      Zarate Visual Field - OU - Extended - Both Eyes    Doing well - intraocular pressure is within acceptable range relative to target pressure with no evidence of progression.   Continue current treatment.  Reviewed importance of continued compliance with treatment and follow up.       2. Pain in or around eye, bilateral  H57.13 379.91 Resolved       3. Pseudophakia of both eyes  Z96.1 V43.1 Monitor           Return to clinic 1 year with GOCT and HVF 24-2

## 2025-03-19 ENCOUNTER — PATIENT MESSAGE (OUTPATIENT)
Dept: FAMILY MEDICINE | Facility: CLINIC | Age: 65
End: 2025-03-19
Payer: COMMERCIAL

## 2025-03-20 ENCOUNTER — TELEPHONE (OUTPATIENT)
Dept: FAMILY MEDICINE | Facility: CLINIC | Age: 65
End: 2025-03-20
Payer: COMMERCIAL

## 2025-03-24 ENCOUNTER — OFFICE VISIT (OUTPATIENT)
Dept: SPORTS MEDICINE | Facility: CLINIC | Age: 65
End: 2025-03-24
Payer: MEDICARE

## 2025-03-24 ENCOUNTER — CLINICAL SUPPORT (OUTPATIENT)
Facility: HOSPITAL | Age: 65
End: 2025-03-24
Attending: ORTHOPAEDIC SURGERY
Payer: COMMERCIAL

## 2025-03-24 ENCOUNTER — HOSPITAL ENCOUNTER (OUTPATIENT)
Dept: RADIOLOGY | Facility: HOSPITAL | Age: 65
Discharge: HOME | End: 2025-03-24
Attending: ORTHOPAEDIC SURGERY
Payer: MEDICARE

## 2025-03-24 VITALS — BODY MASS INDEX: 20.32 KG/M2 | WEIGHT: 150 LBS | HEIGHT: 72 IN

## 2025-03-24 DIAGNOSIS — R29.898 DECREASED STRENGTH OF LOWER EXTREMITY: Primary | ICD-10-CM

## 2025-03-24 DIAGNOSIS — M47.816 LOCALIZED OSTEOARTHRITIS OF LUMBAR SPINE: ICD-10-CM

## 2025-03-24 DIAGNOSIS — M54.16 LUMBAR RADICULOPATHY, CHRONIC: ICD-10-CM

## 2025-03-24 DIAGNOSIS — M54.50 LOW BACK PAIN, UNSPECIFIED BACK PAIN LATERALITY, UNSPECIFIED CHRONICITY, UNSPECIFIED WHETHER SCIATICA PRESENT: ICD-10-CM

## 2025-03-24 DIAGNOSIS — M53.86 DECREASED RANGE OF MOTION OF LUMBAR SPINE: ICD-10-CM

## 2025-03-24 DIAGNOSIS — M54.50 LOW BACK PAIN, UNSPECIFIED BACK PAIN LATERALITY, UNSPECIFIED CHRONICITY, UNSPECIFIED WHETHER SCIATICA PRESENT: Primary | ICD-10-CM

## 2025-03-24 PROCEDURE — 72114 X-RAY EXAM L-S SPINE BENDING: CPT | Mod: 26,,, | Performed by: RADIOLOGY

## 2025-03-24 PROCEDURE — 99999 PR PBB SHADOW E&M-EST. PATIENT-LVL IV: CPT | Mod: PBBFAC,,, | Performed by: ORTHOPAEDIC SURGERY

## 2025-03-24 PROCEDURE — 72114 X-RAY EXAM L-S SPINE BENDING: CPT | Mod: TC,PN

## 2025-03-24 PROCEDURE — 97110 THERAPEUTIC EXERCISES: CPT | Mod: PN | Performed by: PHYSICAL THERAPIST

## 2025-03-24 PROCEDURE — 97140 MANUAL THERAPY 1/> REGIONS: CPT | Mod: PN | Performed by: PHYSICAL THERAPIST

## 2025-03-24 PROCEDURE — 99214 OFFICE O/P EST MOD 30 MIN: CPT | Mod: PBBFAC,25,PN | Performed by: ORTHOPAEDIC SURGERY

## 2025-03-24 PROCEDURE — 97161 PT EVAL LOW COMPLEX 20 MIN: CPT | Mod: PN | Performed by: PHYSICAL THERAPIST

## 2025-03-24 RX ORDER — METHYLPREDNISOLONE 4 MG/1
TABLET ORAL
Qty: 21 EACH | Refills: 0 | Status: SHIPPED | OUTPATIENT
Start: 2025-03-24 | End: 2025-04-14

## 2025-03-24 RX ORDER — GLUCOSAMINE/CHONDR SU A SOD 250-200 MG
TABLET ORAL
COMMUNITY
Start: 2024-06-01

## 2025-03-24 RX ORDER — PEDI MULTIVIT NO.12 W-FLUORIDE 0.25 MG
TABLET,CHEWABLE ORAL
COMMUNITY
Start: 2023-01-01

## 2025-03-24 NOTE — PROGRESS NOTES
Patient ID: Kilo Dailey  YOB: 1960  MRN: [id number]    Chief Complaint: Pain of the Lower Back    Referred By: Dr. Giovanny Webb patient     History of Present Illness: Kilo Dailey is a  64 y.o. male   Retired with a chief complaint of Pain of the Lower Back    Onset: Insidious   Inciting event: 40 years lower back pain radiating down to right knee   Physical therapy: Pelon has completed physical therapy before at the [place] for lumbar pain radiating down the left side  Injections:None      History of Present Illness    CHIEF COMPLAINT:  - Kilo presents for a follow-up.    HPI:  Kilo presents with chronic back pain ongoing for 40 years. He reports the pain is worsening and believes it might be due to a pinched nerve. He describes radiating pain down his right side as nerve pain. About twice a year, he experiences episodes where something will pinch and release, causing him to walk sideways for a week. During these episodes, he takes ibuprofen for relief. He does not feel pain when up and moving around, but has discomfort when sitting for long periods and then standing up. He can push through the pain most of the time. He reports numbness in the middle of his left foot. He previously saw Dr. Maldonado for EMG testing, which revealed an issue with L2. Following this, he attended physical therapy at [facility] with Chana Pearson, which helped alleviate some of the numbness. He continues to perform exercises learned in therapy, including side leg lifts, front and back rolls, planks, and stretches. More recently, he has been experiencing tightness in his right hamstring, which he describes as inflamed. He states there's tightness when he stands up, and he has to stand for about 30 seconds before walking due to the tightness. He attempted to treat this with ibuprofen for 1 week but found it did not completely resolve the issue. He also mentions pain in his right knee joint,  describing it as painful in the joint and radiating upwards. He notes that these symptoms may be unrelated to his back issues.    MEDICATIONS:  - Ibuprofen: Three times daily for 1 week when pain flares up, providing some relief but not completely resolving the issue    ROS:  Musculoskeletal: +back pain, +limb pain, +nerve pain, +muscle stiffness, +difficulty walking, +pain with movement  Neurological: +numbness         Past Medical History:   Past Medical History:   Diagnosis Date    Allergic rhinitis     BPH (benign prostatic hyperplasia)     Colon polyps     Hyperlipidemia     Tennis elbow     Tinea versicolor      Past Surgical History:   Procedure Laterality Date    CATARACT EXTRACTION W/  INTRAOCULAR LENS IMPLANT Right 12/07/2017    CATARACT EXTRACTION W/  INTRAOCULAR LENS IMPLANT Left 12/06/2018    COLONOSCOPY N/A 04/25/2017    Procedure: COLONOSCOPY;  Surgeon: Edilberto Perez MD;  Location: [facility];  Service: Endoscopy;  Laterality: N/A;    COLONOSCOPY N/A 08/11/2022    Procedure: COLONOSCOPY;  Surgeon: Les Benitez MD;  Location: [facility] ENDO;  Service: Endoscopy;  Laterality: N/A;    EYE SURGERY  2 years ago    cataracts     Family History   Problem Relation Name Age of Onset    Hyperlipidemia Father Kilo Dailey         Protate cancer age 75    Hearing loss Father Kilo Dailey     Prostate cancer Father Kilo Dailey     Macular degeneration Father Kilo Dailey     Dementia Father Kilo Dailey     Prostate cancer Paternal Grandfather      Hyperlipidemia Mother Ivette Dailey         age 92 w mild dementia    Dementia Mother Ivette Dailey     Seizures Mother Ivette Dailey     Arthritis Mother Ievtte Dailey         in the spine    Retinitis pigmentosa Sister      Cataracts Sister      Alzheimer's disease Maternal Aunt      Cataracts Maternal Aunt      Alzheimer's disease Maternal Aunt      Alzheimer's disease Maternal Uncle      Cataracts Maternal Uncle      Liver cancer Maternal Uncle      Cataracts  Maternal Grandmother      Prostate cancer Paternal Uncle       Social History[1]  Medication List with Changes/Refills   New Medications    METHYLPREDNISOLONE (MEDROL DOSEPACK) 4 MG TABLET    use as directed   Current Medications    CLOBETASOL (TEMOVATE) 0.05 % EXTERNAL SOLUTION    Apply to affected areas of scalp 1-2 times daily as needed for itch    GLUCOSAMINE-CHONDROITIN (OSTEO BI-FLEX) 250-200 MG TAB        LOVASTATIN (MEVACOR) 10 MG TABLET    Take one tablet every other night    OMEGA-3 FATTY ACIDS FISH OIL (OMEGA-3 2100) 1,050-1,200 MG CAP CAPSULE         Review of patient's allergies indicates:  No Known Allergies  ROS    Physical Exam:   Body mass index is 20.34 kg/m².  There were no vitals filed for this visit.   GENERAL: Well appearing, appropriate for stated age, no acute distress.  CARDIOVASCULAR: Pulses regular by peripheral palpation.  PULMONARY: Respirations are even and non-labored.  NEURO: Awake, alert, and oriented x 3.  PSYCH: Mood & affect are appropriate.  HEENT: Head is normocephalic and atraumatic.          Back (L-Spine & T-Spine) / Neck (C-Spine) Exam     Back (L-Spine & T-Spine) Range of Motion   Extension:  normal Back extension: with pain.  Flexion:  normal   Lateral bend right:  normal   Lateral bend left:  normal   Rotation right:  normal   Rotation left:  normal     Comments:    Intact EHL, FHL, gastrocsoleus, and tibialis anterior. Sensation intact to light touch in superficial peroneal, deep peroneal, tibial, sural, and saphenous nerve distributions. Foot warm and well perfused with capillary refill of less than 2 seconds and palpable pedal pulses.       Muscle Strength   Right Lower Extremity   Hip Abduction: 5/5   Hip Flexion: 5/5   Hip Extensors: 5/5  Quadriceps:  5/5   Hamstrin/5   Anterior tibial:  5/5   Gastrocsoleus:  5/5   EHL:  5/5  Left Lower Extremity   Hip Abduction: 5/5   Hip Flexion: 5/5   Hip Extensors: 5/5  Quadriceps:  5/5   Hamstrin/5   Anterior tibial:   5/5   Gastrocsoleus:  5/5   EHL:  5/5    Reflexes     Left Side  Achilles:  2+  Quadriceps:  2+    Right Side   Achilles:  2+  Quadriceps:  2+    Vascular Exam     Right Pulses  Dorsalis Pedis:      2+  Posterior Tibial:      2+        Left Pulses  Dorsalis Pedis:      2+  Posterior Tibial:      2+        Physical Exam    Musculoskeletal: Normal foot push strength. Normal foot pull strength.  Left Foot: Left great toe dorsiflexion strength 5/5.  Right Foot: Right great toe dorsiflexion strength 5/5.  Back: PAIN WITH TWISTING MOTION. PAIN WITH EXTENSION.  IMAGING:  - X-rays of the back: Multiple times, showing good disc alignment and separation  - EMG (electrical testing): Performed by Dr. Whitlock, showed normal results except for reduced readings on the left side at L2           Imaging:    X-Ray Lumbar Complete Including Flex And Ext  Narrative: EXAM: XR LUMBAR SPINE 5 VIEW WITH FLEX AND EXT    CLINICAL HISTORY: Lower back pain    TECHNIQUE: 7 view lumbar spine series to include flexion and extension    FINDINGS: No fracture or subluxation.  The L4-5 disc space is mildly narrowed.  Small ventral osteophytes in the mid lumbar region.  No evidence of instability on flexion and extension imaging.  Impression:  Mild genu changes with mild disc space narrowing at L4-5.    Finalized on: 3/24/2025 10:41 AM By:  Sumanth Alfredo MD  [facility]# [id number]      2025-03-24 10:43:[id number]     [facility]      Relevant imaging results reviewed and interpreted by me, discussed with the patient and / or family today.    Other Tests:     Assessment & Plan    PLAN SUMMARY:   Started steroid Dosepak for 6 days with tapering dose to reduce inflammation   Continue previously learned PT exercises   Referred to Dr. Gomes for specialized back evaluation   Discussed potential for radiofrequency ablation, pending specialist evaluation   Seek emergency care if experiencing bowel/bladder symptoms, groin numbness/tingling, or sudden leg weakness   Follow up  after completing steroid Dosepak and PT if symptoms persist    LOW BACK PAIN AND SCIATICA:   Started steroid Dosepak for 6 days, tapering dose, to reduce inflammation.   Discussed potential for radiofrequency ablation as a possible future procedure, pending evaluation by a specialist.   Take ibuprofen as needed, but avoid excessive use.   Referred to Dr. Garcia for specialized back evaluation.    MUSCLE SPASM AND CONTRACTURE:   Continue performing previously learned PT exercises (back leg kicks, side leg kicks, over stretches, planks, and arching the back).    SPONDYLOSIS:   Seek immediate emergency care if experiencing bowel or bladder symptoms, groin numbness/tingling, or sudden leg weakness.    FOLLOW-UP:   Follow up after completing steroid Dosepak and PT if symptoms persist.         Patient Instructions   Assessment:  Kilo Dailey is a  64 y.o. male   Retired with a chief complaint of Pain of the Lower Back    40 years of lumbar spine pain   Lumbar osteoarthritis   Lumbar Radiculopathy     Encounter Diagnoses   Name Primary?    Low back pain, unspecified back pain laterality, unspecified chronicity, unspecified whether sciatica present Yes    Localized osteoarthritis of lumbar spine     Lumbar radiculopathy, chronic         Plan:  Discussed non-operative treatment options in the form of rest, activity modifications, oral anti-inflammatories, corticosteroid injections, and physical therapy/physician directed home exercise program.    Discussed medication recommended and prescribed a Medrol Dose pack   Order PT at [facility]  - 2-3x per week for 6-8 weeks   Referral to Physiatry with Dr. Tillman    Discussed if any bowel/bladder symptoms, groin numbness/tingling, or sudden leg weakness that this is a medical emergency     Follow-up: as needed. Please reach out to us sooner if there are any problems between now and then.    About Dr. Jon Webb's Research & Publications    Give us Feedback:    Google: Leave Google Review  Healthgrades: Leave Healthgrades Review       Provider Note/Medical Decision Making:     I discussed worrisome and red flag signs and symptoms with the patient. Specifically, I warned about bowel or bladder changes, incontinence, leg weakness, groin numbness or tingling, gait problems, and others. The patient expressed understanding that these could represent a true emergency, and to go to the emergency room immediately if they experience any of these. Treatment plan was developed with input from the patient/family, and they expressed understanding and agreement with the plan. All questions were answered today.          Daniel Webb MD  Orthopaedic Surgery & Sports Medicine       Disclaimer: This note was prepared using a voice recognition system and is likely to have sound alike errors within the text.     This note was generated with the assistance of ambient listening technology. Verbal consent was obtained by the patient and accompanying visitor(s) for the recording of patient appointment to facilitate this note. I attest to having reviewed and edited the generated note for accuracy, though some syntax or spelling errors may persist. Please contact the author of this note for any clarification.           [1]   Social History  Socioeconomic History    Marital status:    Tobacco Use    Smoking status: Never     Passive exposure: Never    Smokeless tobacco: Never   Substance and Sexual Activity    Alcohol use: Yes     Comment: 1-2 times a month    Drug use: No    Sexual activity: Yes     Partners: Female     Birth control/protection: None   Social History Narrative    The patient is , has 2 children and retired as a .               Social Drivers of Health     Financial Resource Strain: Low Risk  (6/26/2024)    Overall Financial Resource Strain (CARDIA)     Difficulty of Paying Living Expenses: Not hard at all   Food Insecurity: No Food  Insecurity (6/26/2024)    Hunger Vital Sign     Worried About Running Out of Food in the Last Year: Never true     Ran Out of Food in the Last Year: Never true   Transportation Needs: No Transportation Needs (3/29/2023)    PRAPARE - Transportation     Lack of Transportation (Medical): No     Lack of Transportation (Non-Medical): No   Physical Activity: Sufficiently Active (6/26/2024)    Exercise Vital Sign     Days of Exercise per Week: 3 days     Minutes of Exercise per Session: 50 min   Stress: No Stress Concern Present (6/26/2024)    [employer] - Occupational Stress Questionnaire     Feeling of Stress : Only a little   Housing Stability: Low Risk  (3/29/2023)    Housing Stability Vital Sign     Unable to Pay for Housing in the Last Year: No     Number of Places Lived in the Last Year: 1     Unstable Housing in the Last Year: No

## 2025-03-24 NOTE — PROGRESS NOTES
[facility] OUTPATIENT THERAPY AND WELLNESS   Physical Therapy Initial Evaluation      Name: Kilo Sanford Dailey  [facility] Number: [id number]    Therapy Diagnosis:   Encounter Diagnoses   Name Primary?    Low back pain, unspecified back pain laterality, unspecified chronicity, unspecified whether sciatica present     Localized osteoarthritis of lumbar spine     Decreased strength of lower extremity Yes    Decreased range of motion of lumbar spine         Physician: Daniel Webb MD    Physician Orders: PT Eval and Treat   Medical Diagnosis from Referral:  Low back pain, unspecified back pain laterality, unspecified chronicity, unspecified whether sciatica present [M54.50], Localized osteoarthritis of lumbar spine [M47.816]   Evaluation Date: 3/24/2025  Authorization Period Expiration: 3/24/2026  Plan of Care Expiration: 6/24/2025  Progress Note Due: 4/24/2025  Visit # / Visits authorized: 1/ 1     FOTO:  Goal: %  -Intake: %  -Status: incomplete  -Discharge: incomplete    Precautions: Standard     Time In: 9:24 am  Time Out: 10:30 am  Total Appointment Time (timed & untimed codes): 66 minutes    Subjective     Date of onset:Acute on chronic, flare up of old injury from 30 years ago    History of current condition - Pelon reports: Long history of back pain from an injury in his youth. He reports his back pain has improved greatly and he recently underwent physical therapy at the [facility] for some left sided back pain with nerve involvement and had great success. He is now experiencing pain localized to the right side of his low back and has some symptoms down the posterolateral thigh to the knee. He experiences symptoms most often with forward flexion and sitting for long periods of time. He denies any bowel or bladder changes, numbness or tingling, loss of balance, or significant night pain. Reports being very active and contiunuing to do strength training at the gym that he received from his previous physical therapist.      Falls: none    Imaging: [x] Xray [] MRI [] CT: Performed on:   Narrative & Impression  EXAM: XR LUMBAR SPINE 5 VIEW WITH FLEX AND EXT     CLINICAL HISTORY: Lower back pain     TECHNIQUE: 7 view lumbar spine series to include flexion and extension     FINDINGS: No fracture or subluxation.  The L4-5 disc space is mildly narrowed.  Small ventral osteophytes in the mid lumbar region.  No evidence of instability on flexion and extension imaging.        Impression:   Mild genu changes with mild disc space narrowing at L4-5.    Pain:  Current 4/10, worst 5/10, best 0/10   Location: [x] Right   [] Left:  low back  Description: aching  and dull  Aggravating Factors: Bending forward, sitting for long periods, getting up from sitting.  Easing Factors: activity avoidance, rest, Moving around, standing    Prior Therapy: Yes for left sided LBP with radicular pain at the [facility]  Social History: not asked lives with their family  Occupation: not asked  Prior Level of Function: Able to complete all ADLs without pain, and able to stay active with running and weightlifting activities  Current Level of Function: Some pain with ADLs and lifting, but still able to participate    Patients goals: Figure out some strategies to decrease his back pain and improve his range of motion     Medical History:   Past Medical History:   Diagnosis Date    Allergic rhinitis     BPH (benign prostatic hyperplasia)     Colon polyps     Hyperlipidemia     Tennis elbow     Tinea versicolor        Surgical History:   Kilo Dailey  has a past surgical history that includes Colonoscopy (N/A, 04/25/2017); Cataract extraction w/  intraocular lens implant (Right, 12/07/2017); Cataract extraction w/  intraocular lens implant (Left, 12/06/2018); Eye surgery (2 years ago); and Colonoscopy (N/A, 08/11/2022).    Medications:   Kilo has a current medication list which includes the following prescription(s): clobetasol, glucosamine-chondroitin, lovastatin,  methylprednisolone, and omega-3 2100.    Allergies:   Review of patient's allergies indicates:  No Known Allergies     Objective        Observation:   POSTURE:  Posture grossly normal, ASIS level, PSIS level in standing  GAIT: unremarkable      FUNCTIONAL MOVEMENT PATTERNS  Movement Analysis Notes   Squat [x]Functional  []Dysfunctional:  []Painful  [x]Non-Painful    Single Leg Squat  [x]Functional  []Dysfunctional:  []Painful  [x]Non-Painful        Lower extremity myotomes  Neuro Testing Right   Left     L2 (hip flexion) 3+/5 4/5   L3 (knee extension) 4+/5 5/5   L4 (ankle DF) 4+/5 5/5   L5 (great toe extension) 4/5 4+/5   S1 (plantarflexion) NT NT    Sensation-  Intact to light touch bilaterally      RANGE OF MOTION:   Lumbar ROM Right  (spine) Left   Pain/Dysfunction with Movement Goal   Lumbar Flexion (60º) 66% --- Concordant pain    Lumbar Extension (30º) 75% ---     Lumbar Side Bending (25º) 90%      Lumbar Rotation 75%          Hip AROM/PROM Right Left Pain/Dysfunction with Movement Goal   Hip Flexion (120º) 120 120     Hip Extension (30º) 20 20     Hip Abduction (45º) 40 40     Hip IR (45º) 40 45     Hip ER (45º) 50 45            STRENGTH:   L/E MMT Right  (spine) Left Pain/Dysfunction with Movement Goal   Modified (90/90) Abdominal Strength  NT ---     Hip Flexion  3+/5 4-/5  4+/5 B   Hip Extension  4/5 4/5  4+/5 B   Hip Abduction  4/5 4/5  4+/5 B   Knee Extension 4/5 4+/5  5/5 B   Knee Flexion 4+/5 4+/5  5/5 B   Hip IR 4/5 4/5  4+/5 B   Hip ER 4/5 4/5  4+/5 B          MUSCLE LENGTH:   Muscle Tested  Right Left  Limitation Goal   Hip Flexors [x] Normal  [] Limited [x] Normal  [] Limited  Normal B   ITB / TFL [] Normal  [] Limited [] Normal  [] Limited  Normal B   Quadriceps [x] Normal  [] Limited [x] Normal  [] Limited  Normal B   Hamstrings  [] Normal  [x] Limited [] Normal  [x] Limited  Normal B       Joint mobility:  Lumbar spine hypomobile, Thoracic spine hypomobile      SPECIAL TESTS:    Right  (spine)  Left  Goal   SLUMP [x] Positive     [] Negative [] Positive     [x] Negative Negative B    Straight Leg Raise [x] Positive     [] Negative [] Positive     [x] Negative Negative B    REJI [] Positive     [x] Negative [] Positive     [x] Negative Negative B           SENSATION  [x] Intact to Light Touch   [] Impaired:      PALPATION: Structures: Increased tenderness to palpation of: right lower lumbar paraspinals        Function:     Intake Outcome Measure for FOTO Lumbar Survey    Therapist reviewed FOTO scores for Kilo Dailey on 3/24/2025.   FOTO documents entered into [employer] - see Media section.    Intake Score: %         Treatment     Total Treatment time (time-based codes) separate from Evaluation: (25) minutes     Pelon received the treatments listed below:      CPT Code Intervention Date/Notes  3/24      MT Manual Therapy Lumbar PA, Thoracic PA, Right sided Lumbar gapping Gr5, Thoracic Supine HVLAT   TE Thoracic Rotation 30x   TE Lumbar Rotation 30x   TE Seated Hamstring Stretch 30s x 5     15 minutes of Manual therapy (MT) to improve pain and ROM. (94807)  00 minutes of Neuromuscular Re-Education (NMR)  to improve: Balance, Coordination, Kinesthetic, Sense, Proprioception, and Posture. (63031)  00 minutes of Therapeutic Activities (TA) to improve functional performance. (92741)  20 minutes of Therapeutic Exercise (TE) to develop strength, endurance, ROM, and flexibility. (84083)  00 minutes of Unattended Electrical Stimulation (ES) for muscle performance and/or pain modulation. (34327)  00 Minutes of Vasopneumatic Device Therapy () for management of swelling/edema. (15095)  BFR: Blood flow restriction applied during exercise  NP: Not Performed  * indicates that exercise was performed, not billed today since patient was not under direct one-on-one supervision    Patient Education and Home Exercises     Education provided: (10) minutes  PURPOSE: Patient educated on the impairments noted above and the  effects of physical therapy intervention to improve overall condition and QOL.   Physical therapy diagnosis, prognosis, and plan of care.   Activity Modifications: Decrease time sitting in recliner    Written Home Exercises Provided: yes.  Exercises were reviewed and Pelon was able to demonstrate them prior to the end of the session.  Pelon demonstrated good  understanding of the education provided. See EMR under Patient Instructions for exercises provided during therapy sessions.    Assessment     Kilo is a 64 y.o. male referred to outpatient Physical Therapy with a medical diagnosis of Low back pain, unspecified back pain laterality, unspecified chronicity, unspecified whether sciatica present [M54.50], Localized osteoarthritis of lumbar spine [M47.816] . Clinical exam is consistent with Low back pain with mobility deficits with right sided radiating pain.     Problem List:  Decreased lumbar segmental mobility  Decreased thoracic segmental mobility  Decreased hamstring length bilaterally  Mildly impaired hip strength ext/abd  Impaired thoracic rotation  Impaired lumbar rotation    Pt prognosis is Excellent  Pt will benefit from skilled outpatient Physical Therapy to address the deficits stated above and in the chart below, provide pt/family education, and to maximize pt's level of independence.     Plan of care discussed with patient: Yes  Pt's spiritual, cultural and educational needs considered and patient is agreeable to the plan of care and goals as stated below:     Anticipated Barriers for therapy: none    Medical Necessity is demonstrated by the following   History  Co-morbidities and personal factors that may impact the plan of care [x] LOW: no personal factors / co-morbidities  [] MODERATE: 1-2 personal factors / co-morbidities  [] HIGH: 3+ personal factors / co-morbidities    Moderate / High Support Documentation: See patient medical history and objective assessment     Examination  Body Structures and  Functions, activity limitations and participation restrictions that may impact the plan of care [x] LOW: addressing 1-2 elements  [] MODERATE: 3+ elements  [] HIGH: 4+ elements (please support below)    Moderate / High Support Documentation: See patient medical history and objective assessment     Clinical Presentation [x] LOW: stable  [] MODERATE: Evolving  [] HIGH: Unstable     Decision Making/ Complexity Score: low         Short Term Goals:  6 weeks Status  Date Met   PAIN: Pt will report worst pain of 1/10 in order to progress toward max functional ability and improve quality of life. [x] Progressing  [] Met  [] Not Met    FUNCTION: Patient will demonstrate improved function as indicated by a functional score improvement of at least 5 points on FOTO. [x] Progressing  [] Met  [] Not Met    MOBILITY: Patient will improve AROM to 50% of stated goals, listed in objective measures above, in order to progress towards independence with functional activities.  [x] Progressing  [] Met  [] Not Met    STRENGTH: Patient will improve strength to 50% of stated goals, listed in objective measures above, in order to progress towards independence with functional activities. [x] Progressing  [] Met  [] Not Met    POSTURE: Patient will correct postural deviations in sitting and standing, to decrease pain and promote long term stability.  [x] Progressing  [] Met  [] Not Met    HEP: Patient will demonstrate independence with HEP in order to progress toward functional independence. [x] Progressing  [] Met  [] Not Met      Long Term Goals:  12 weeks Status Date Met   PAIN: Pt will report worst pain of 0/10 in order to progress toward max functional ability and improve quality of life [x] Progressing  [] Met  [] Not Met    FUNCTION: Patient will demonstrate improved function as indicated by a FOTO functional score improvement as listed in header. [x] Progressing  [] Met  [] Not Met    MOBILITY: Patient will improve AROM to stated  goals, listed in objective measures above, in order to return to maximal functional potential and improve quality of life. Thoracolumbar active range of motion equal to or greater than 90% norm. Hamstring Length normal. Lumbar and thoracic segmental mobility normal. [x] Progressing  [] Met  [] Not Met    STRENGTH: Patient will improve strength to stated goals, listed in objective measures above, in order to improve functional independence and quality of life. Hip strength 5/5 on all MMT [x] Progressing  [] Met  [] Not Met    GAIT: Patient will demonstrate normalized gait mechanics with minimal compensation in order to return to PLOF. [x] Progressing  [] Met  [] Not Met    Patient will return to normal ADL's, IADL's, community involvement, recreational activities, and work-related activities with less than or equal to 0/10 pain and maximal function.  [x] Progressing  [] Met  [] Not Met    Patient will demonstrate negative straight leg raise testing. [x] Progressing  [] Met  [] Not Met      Plan   Plan of care Certification: 3/24/2025 to 6/24/2025.    Outpatient Physical Therapy 1 times weekly for 12 weeks to include any combination of the following interventions: virtual visits, dry needling, modalities, electrical stimulation (IFC, Pre-Mod, Attended with Functional Dry Needling), Cervical/Lumbar Traction, Gait Training, Manual Therapy, Neuromuscular Re-ed, Patient Education, Self Care, Therapeutic Exercise, and Therapeutic Activites     Les Thomas, PT, DPT, DPT    Dusty Olvera, EDIE    I, Les Thomas, PT, DPT, certify that I was present in the room directing the student's service delivery and guiding them using my clinical judgment. As the co-signing therapist, I have reviewed the student's documentation and take full responsibility for the treatment, assessment, and plan.    I CERTIFY THE NEED FOR THESE SERVICES FURNISHED UNDER THIS PLAN OF TREATMENT AND WHILE UNDER MY CARE   Physician's comments:      Physician's Signature: ___________________________________________________

## 2025-03-24 NOTE — PATIENT INSTRUCTIONS
Assessment:  Kilo Dailey is a  64 y.o. male   Retired with a chief complaint of Pain of the Lower Back    40 years of lumbar spine pain   Lumbar osteoarthritis   Lumbar Radiculopathy     Encounter Diagnoses   Name Primary?    Low back pain, unspecified back pain laterality, unspecified chronicity, unspecified whether sciatica present Yes    Localized osteoarthritis of lumbar spine     Lumbar radiculopathy, chronic         Plan:  Discussed non-operative treatment options in the form of rest, activity modifications, oral anti-inflammatories, corticosteroid injections, and physical therapy/physician directed home exercise program.    Discussed medication recommended and prescribed a Medrol Dose pack   Order PT at [place]  - 2-3x per week for 6-8 weeks   Referral to Physiatry with Dr. Tillman    Discussed if any bowel/bladder symptoms, groin numbness/tingling, or sudden leg weakness that this is a medical emergency     Follow-up: as needed. Please reach out to us sooner if there are any problems between now and then.    About Dr. Jon Webb's Research & Publications    Give us Feedback:   Google: Leave Google Review  Healthgrades: Leave Healthgrades Review

## 2025-03-26 PROBLEM — R29.898 DECREASED STRENGTH OF LOWER EXTREMITY: Status: ACTIVE | Noted: 2023-04-27

## 2025-03-26 PROBLEM — M53.86 DECREASED RANGE OF MOTION OF LUMBAR SPINE: Status: ACTIVE | Noted: 2025-03-26

## 2025-04-02 ENCOUNTER — CLINICAL SUPPORT (OUTPATIENT)
Facility: HOSPITAL | Age: 65
End: 2025-04-02
Payer: MEDICARE

## 2025-04-02 DIAGNOSIS — R29.898 DECREASED STRENGTH OF LOWER EXTREMITY: Primary | ICD-10-CM

## 2025-04-02 DIAGNOSIS — M53.86 DECREASED RANGE OF MOTION OF LUMBAR SPINE: ICD-10-CM

## 2025-04-02 PROCEDURE — 97112 NEUROMUSCULAR REEDUCATION: CPT | Mod: PN | Performed by: PHYSICAL THERAPIST

## 2025-04-02 PROCEDURE — 97140 MANUAL THERAPY 1/> REGIONS: CPT | Mod: PN | Performed by: PHYSICAL THERAPIST

## 2025-04-02 NOTE — PROGRESS NOTES
[facility] OUTPATIENT THERAPY AND WELLNESS   Physical Therapy Treatment Note      Name: Kilo Dailey  [facility] Number: [id number]    Therapy Diagnosis: No diagnosis found.  Physician: Daniel Webb MD     Physician Orders: PT Eval and Treat   Medical Diagnosis from Referral:  Low back pain, unspecified back pain laterality, unspecified chronicity, unspecified whether sciatica present [M54.50], Localized osteoarthritis of lumbar spine [M47.816]   Evaluation Date: 3/24/2025  Authorization Period Expiration: 12/31/25  Plan of Care Expiration: 6/24/2025  Progress Note Due: 4/24/2025  Visit # / Visits authorized: 1/ 10    Time In: 1020  Time Out: 1120  Total Billable Time: 40 minutes Billing reflects 1:1 direct supervision    MD follow-up:    Precautions: Standard    FOTO:  -Intake: 72  -Status: incomplete  -Discharge: incomplete    Subjective     Pt reports: he felt good after last visit. [facility]Still has the most pain with prolonged sitting  He was compliant with home exercise program.  Response to previous treatment: Improving symptoms  Functional change: Gradually improving function    Pain: Minimal/10  Location: bilateral lumbar spine     Objective      Objective Measures updated at progress report unless specified otherwise.    Problem list  Lumbar flexion syndrome    Treatment     Pelon received the treatments listed below:      CPT Code Intervention Date/Notes  4/2   MT Manual Therapy Lumbar PA, Thoracic PA, Right sided Lumbar gapping Gr5, Thoracic Supine HVLAT   Te* Bike 10 minutes   TE Elbows and knees Thoracic Rotation 30x   NR RDL 25# 3 x 8 x 3 seconds down   NR Reverse hyper 3 x 8   NR Side plank full 3 x 20s ea     15 minutes of Manual therapy (MT) to improve pain and ROM. (56306)  20 minutes of Neuromuscular Re-Education (NMR)  to improve: Balance, Coordination, Kinesthetic, Sense, Proprioception, and Posture. (32787)  00 minutes of Therapeutic Activities  (TA) to improve functional performance. (87590)  05 minutes of Therapeutic Exercise (TE) to develop strength, endurance, ROM, and flexibility. (94100)  00 minutes of Unattended Electrical Stimulation (ES) for muscle performance and/or pain modulation. (49076)  00 Minutes of Vasopneumatic Device Therapy () for management of swelling/edema. (77852)  BFR: Blood flow restriction applied during exercise  NP: Not Performed  * indicates that exercise was performed, not billed today since patient was not under direct one-on-one supervision        Patient Education and Home Exercises         Education provided:   Physical therapy diagnosis, prognosis, and plan of care.     Written Home Exercises Provided: Patient instructed to cont prior HEP.  Exercises were reviewed and Pelon was able to demonstrate them prior to the end of the session.  Pelon demonstrated good  understanding of the education provided.     See EMR under Patient Instructions for exercises provided prior visit.    Assessment     The patient continues to have impaired lumbar mobility. Also noted to have flat back posture with impaired hamstring flexibility. I encouraged him to continue to work on neutral, spine, stability exercises, and I think he will benefit from work on hamstring/Glute mobility, as well as lumbar extensor strengthening.  Pelon Is progressing well towards his goals.   Pt prognosis is Good.     Pt will continue to benefit from skilled outpatient physical therapy to address the deficits listed in the problem list box on initial evaluation, provide pt/family education and to maximize pt's level of independence in the home and community environment.     Pt's spiritual, cultural and educational needs considered and pt agreeable to plan of care and goals.    Anticipated barriers to physical therapy: Chronicity of condition    Goals:   Short Term Goals:  6 weeks Status  Date Met   PAIN: Pt will report worst pain of 1/10 in order to progress  toward max functional ability and improve quality of life. [x] Progressing  [] Met  [] Not Met     FUNCTION: Patient will demonstrate improved function as indicated by a functional score improvement of at least 5 points on FOTO. [x] Progressing  [] Met  [] Not Met     MOBILITY: Patient will improve AROM to 50% of stated goals, listed in objective measures above, in order to progress towards independence with functional activities.  [x] Progressing  [] Met  [] Not Met     STRENGTH: Patient will improve strength to 50% of stated goals, listed in objective measures above, in order to progress towards independence with functional activities. [x] Progressing  [] Met  [] Not Met     POSTURE: Patient will correct postural deviations in sitting and standing, to decrease pain and promote long term stability.  [x] Progressing  [] Met  [] Not Met     HEP: Patient will demonstrate independence with HEP in order to progress toward functional independence. [x] Progressing  [] Met  [] Not Met        Long Term Goals:  12 weeks Status Date Met   PAIN: Pt will report worst pain of 0/10 in order to progress toward max functional ability and improve quality of life [x] Progressing  [] Met  [] Not Met     FUNCTION: Patient will demonstrate improved function as indicated by a FOTO functional score improvement as listed in header. [x] Progressing  [] Met  [] Not Met     MOBILITY: Patient will improve AROM to stated goals, listed in objective measures above, in order to return to maximal functional potential and improve quality of life. Thoracolumbar active range of motion equal to or greater than 90% norm. Hamstring Length normal. Lumbar and thoracic segmental mobility normal. [x] Progressing  [] Met  [] Not Met     STRENGTH: Patient will improve strength to stated goals, listed in objective measures above, in order to improve functional independence and quality of life. Hip strength 5/5 on all MMT [x] Progressing  [] Met  [] Not Met      GAIT: Patient will demonstrate normalized gait mechanics with minimal compensation in order to return to PLOF. [x] Progressing  [] Met  [] Not Met     Patient will return to normal ADL's, IADL's, community involvement, recreational activities, and work-related activities with less than or equal to 0/10 pain and maximal function.  [x] Progressing  [] Met  [] Not Met     Patient will demonstrate negative straight leg raise testing. [x] Progressing  [] Met  [] Not Met          Plan   Plan of care Certification: 3/24/2025 to 6/24/2025.        Continue to progress per protocol and per patient tolerance      Les Thomas, PT, DPT

## 2025-04-09 ENCOUNTER — CLINICAL SUPPORT (OUTPATIENT)
Facility: HOSPITAL | Age: 65
End: 2025-04-09
Payer: MEDICARE

## 2025-04-09 DIAGNOSIS — R29.898 DECREASED STRENGTH OF LOWER EXTREMITY: Primary | ICD-10-CM

## 2025-04-09 DIAGNOSIS — M53.86 DECREASED RANGE OF MOTION OF LUMBAR SPINE: ICD-10-CM

## 2025-04-09 PROCEDURE — 97112 NEUROMUSCULAR REEDUCATION: CPT | Mod: PN | Performed by: PHYSICAL THERAPIST

## 2025-04-09 PROCEDURE — 97110 THERAPEUTIC EXERCISES: CPT | Mod: PN | Performed by: PHYSICAL THERAPIST

## 2025-04-09 NOTE — PROGRESS NOTES
[facility]   Physical Therapy Treatment Note      Name: Kilo Dailey  [facility] Number: [id number]    Therapy Diagnosis: No diagnosis found.  Physician: Daniel Webb MD     Physician Orders: PT Eval and Treat   Medical Diagnosis from Referral:  Low back pain, unspecified back pain laterality, unspecified chronicity, unspecified whether sciatica present [M54.50], Localized osteoarthritis of lumbar spine [M47.816]   Evaluation Date: 3/24/2025  Authorization Period Expiration: 12/31/25  Plan of Care Expiration: 6/24/2025  Progress Note Due: 4/24/2025  Visit # / Visits authorized: 2/ 10    Time In: 1025  Time Out: 1110  Total Billable Time: 35 minutes Billing reflects 1:1 direct supervision    MD follow-up:    Precautions: Standard    FOTO:  -Intake: 72  -Status: incomplete  -Discharge: incomplete    Subjective     Pt reports:  He is getting a nerve block shot soon.  He was compliant with home exercise program.  Response to previous treatment: Improving symptoms  Functional change: Gradually improving function    Pain: Minimal/10  Location: bilateral lumbar spine     Objective      Objective Measures updated at progress report unless specified otherwise.    Problem list  Lumbar flexion syndrome    Treatment     Pelon received the treatments listed below:      CPT Code Intervention Date/Notes  4/9   MT Manual Therapy Lumbar PA, Thoracic PA, Right sided Lumbar gapping Gr5, Thoracic Supine HVLAT   te Bike -   TE Active HS sretch 10 x 5s ea   TE Elbows and knees Thoracic Rotation 30x   NR RDL Stick 3 x 8 x 3 seconds down   NR Reverse hyper 3 x 8   NR Side plank full 3 x 20s ea     10 minutes of Manual therapy (MT) to improve pain and ROM. (09395)  15 minutes of Neuromuscular Re-Education (NMR)  to improve: Balance, Coordination, Kinesthetic, Sense, Proprioception, and Posture. (98147)  00 minutes of Therapeutic Activities (TA) to improve functional performance. (98715)  10 minutes of  Therapeutic Exercise (TE) to develop strength, endurance, ROM, and flexibility. (64175)  00 minutes of Unattended Electrical Stimulation (ES) for muscle performance and/or pain modulation. (87091)  00 Minutes of Vasopneumatic Device Therapy () for management of swelling/edema. (39279)  BFR: Blood flow restriction applied during exercise  NP: Not Performed  * indicates that exercise was performed, not billed today since patient was not under direct one-on-one supervision        Patient Education and Home Exercises         Education provided:   Physical therapy diagnosis, prognosis, and plan of care.     Written Home Exercises Provided: Patient instructed to cont prior HEP.  Exercises were reviewed and Pelon was able to demonstrate them prior to the end of the session.  Pelon demonstrated good  understanding of the education provided.     See EMR under Patient Instructions for exercises provided prior visit.    Assessment     Continued hamstring stiffness and impairment in spine stability. Improved ROM noted with exercise.    Pelon Is progressing well towards his goals.   Pt prognosis is Good.     Pt will continue to benefit from skilled outpatient physical therapy to address the deficits listed in the problem list box on initial evaluation, provide pt/family education and to maximize pt's level of independence in the home and community environment.     Pt's spiritual, cultural and educational needs considered and pt agreeable to plan of care and goals.    Anticipated barriers to physical therapy: Chronicity of condition    Goals:   Short Term Goals:  6 weeks Status  Date Met   PAIN: Pt will report worst pain of 1/10 in order to progress toward max functional ability and improve quality of life. [x] Progressing  [] Met  [] Not Met     FUNCTION: Patient will demonstrate improved function as indicated by a functional score improvement of at least 5 points on FOTO. [x] Progressing  [] Met  [] Not Met     MOBILITY:  Patient will improve AROM to 50% of stated goals, listed in objective measures above, in order to progress towards independence with functional activities.  [x] Progressing  [] Met  [] Not Met     STRENGTH: Patient will improve strength to 50% of stated goals, listed in objective measures above, in order to progress towards independence with functional activities. [x] Progressing  [] Met  [] Not Met     POSTURE: Patient will correct postural deviations in sitting and standing, to decrease pain and promote long term stability.  [x] Progressing  [] Met  [] Not Met     HEP: Patient will demonstrate independence with HEP in order to progress toward functional independence. [x] Progressing  [] Met  [] Not Met        Long Term Goals:  12 weeks Status Date Met   PAIN: Pt will report worst pain of 0/10 in order to progress toward max functional ability and improve quality of life [x] Progressing  [] Met  [] Not Met     FUNCTION: Patient will demonstrate improved function as indicated by a FOTO functional score improvement as listed in header. [x] Progressing  [] Met  [] Not Met     MOBILITY: Patient will improve AROM to stated goals, listed in objective measures above, in order to return to maximal functional potential and improve quality of life. Thoracolumbar active range of motion equal to or greater than 90% norm. Hamstring Length normal. Lumbar and thoracic segmental mobility normal. [x] Progressing  [] Met  [] Not Met     STRENGTH: Patient will improve strength to stated goals, listed in objective measures above, in order to improve functional independence and quality of life. Hip strength 5/5 on all MMT [x] Progressing  [] Met  [] Not Met     GAIT: Patient will demonstrate normalized gait mechanics with minimal compensation in order to return to PLOF. [x] Progressing  [] Met  [] Not Met     Patient will return to normal ADL's, IADL's, community involvement, recreational activities, and work-related activities with  less than or equal to 0/10 pain and maximal function.  [x] Progressing  [] Met  [] Not Met     Patient will demonstrate negative straight leg raise testing. [x] Progressing  [] Met  [] Not Met          Plan   Plan of care Certification: 3/24/2025 to 6/24/2025.        Continue to progress per protocol and per patient tolerance      Les Thomas, PT, DPT

## 2025-05-12 ENCOUNTER — LAB VISIT (OUTPATIENT)
Dept: LAB | Facility: HOSPITAL | Age: 65
End: 2025-05-12
Attending: FAMILY MEDICINE
Payer: MEDICARE

## 2025-05-12 ENCOUNTER — OFFICE VISIT (OUTPATIENT)
Dept: FAMILY MEDICINE | Facility: CLINIC | Age: 65
End: 2025-05-12
Payer: MEDICARE

## 2025-05-12 VITALS
BODY MASS INDEX: 20.77 KG/M2 | RESPIRATION RATE: 18 BRPM | WEIGHT: 153.31 LBS | SYSTOLIC BLOOD PRESSURE: 128 MMHG | DIASTOLIC BLOOD PRESSURE: 64 MMHG | OXYGEN SATURATION: 98 % | HEIGHT: 72 IN | HEART RATE: 66 BPM | TEMPERATURE: 97 F

## 2025-05-12 DIAGNOSIS — E78.5 HYPERLIPIDEMIA LDL GOAL <130: ICD-10-CM

## 2025-05-12 DIAGNOSIS — E78.5 HYPERLIPIDEMIA LDL GOAL <130: Primary | Chronic | ICD-10-CM

## 2025-05-12 DIAGNOSIS — L21.8 SEBORRHEA CORPORIS: ICD-10-CM

## 2025-05-12 DIAGNOSIS — Z12.5 PROSTATE CANCER SCREENING: ICD-10-CM

## 2025-05-12 DIAGNOSIS — G56.22 CUBITAL TUNNEL SYNDROME ON LEFT: ICD-10-CM

## 2025-05-12 DIAGNOSIS — N40.0 BENIGN PROSTATIC HYPERPLASIA, UNSPECIFIED WHETHER LOWER URINARY TRACT SYMPTOMS PRESENT: Chronic | ICD-10-CM

## 2025-05-12 PROBLEM — M53.86 DECREASED RANGE OF MOTION OF LUMBAR SPINE: Status: RESOLVED | Noted: 2025-03-26 | Resolved: 2025-05-12

## 2025-05-12 PROBLEM — R20.0 NUMBNESS AND TINGLING IN LEFT HAND: Status: RESOLVED | Noted: 2023-01-23 | Resolved: 2025-05-12

## 2025-05-12 PROBLEM — R20.2 NUMBNESS AND TINGLING IN LEFT HAND: Status: RESOLVED | Noted: 2023-01-23 | Resolved: 2025-05-12

## 2025-05-12 PROBLEM — R29.898 DECREASED STRENGTH OF LOWER EXTREMITY: Status: RESOLVED | Noted: 2023-04-27 | Resolved: 2025-05-12

## 2025-05-12 LAB
ABSOLUTE EOSINOPHIL (OHS): 0.09 K/UL
ABSOLUTE MONOCYTE (OHS): 0.58 K/UL (ref 0.3–1)
ABSOLUTE NEUTROPHIL COUNT (OHS): 3.64 K/UL (ref 1.8–7.7)
ALBUMIN SERPL BCP-MCNC: 4.3 G/DL (ref 3.5–5.2)
ALP SERPL-CCNC: 64 UNIT/L (ref 40–150)
ALT SERPL W/O P-5'-P-CCNC: 21 UNIT/L (ref 10–44)
ANION GAP (OHS): 7 MMOL/L (ref 8–16)
AST SERPL-CCNC: 22 UNIT/L (ref 11–45)
BASOPHILS # BLD AUTO: 0.06 K/UL
BASOPHILS NFR BLD AUTO: 1.1 %
BILIRUB SERPL-MCNC: 0.8 MG/DL (ref 0.1–1)
BUN SERPL-MCNC: 21 MG/DL (ref 8–23)
CALCIUM SERPL-MCNC: 9.7 MG/DL (ref 8.7–10.5)
CHLORIDE SERPL-SCNC: 105 MMOL/L (ref 95–110)
CHOLEST SERPL-MCNC: 260 MG/DL (ref 120–199)
CHOLEST/HDLC SERPL: 3.7 {RATIO} (ref 2–5)
CO2 SERPL-SCNC: 27 MMOL/L (ref 23–29)
CREAT SERPL-MCNC: 0.9 MG/DL (ref 0.5–1.4)
ERYTHROCYTE [DISTWIDTH] IN BLOOD BY AUTOMATED COUNT: 12.7 % (ref 11.5–14.5)
GFR SERPLBLD CREATININE-BSD FMLA CKD-EPI: >60 ML/MIN/1.73/M2
GLUCOSE SERPL-MCNC: 91 MG/DL (ref 70–110)
HCT VFR BLD AUTO: 48 % (ref 40–54)
HDLC SERPL-MCNC: 71 MG/DL (ref 40–75)
HDLC SERPL: 27.3 % (ref 20–50)
HGB BLD-MCNC: 15.3 GM/DL (ref 14–18)
IMM GRANULOCYTES # BLD AUTO: 0.02 K/UL (ref 0–0.04)
IMM GRANULOCYTES NFR BLD AUTO: 0.4 % (ref 0–0.5)
LDLC SERPL CALC-MCNC: 169.8 MG/DL (ref 63–159)
LYMPHOCYTES # BLD AUTO: 1.23 K/UL (ref 1–4.8)
MCH RBC QN AUTO: 29.3 PG (ref 27–31)
MCHC RBC AUTO-ENTMCNC: 31.9 G/DL (ref 32–36)
MCV RBC AUTO: 92 FL (ref 82–98)
NONHDLC SERPL-MCNC: 189 MG/DL
NUCLEATED RBC (/100WBC) (OHS): 0 /100 WBC
PLATELET # BLD AUTO: 263 K/UL (ref 150–450)
PMV BLD AUTO: 10.6 FL (ref 9.2–12.9)
POTASSIUM SERPL-SCNC: 5.1 MMOL/L (ref 3.5–5.1)
PROT SERPL-MCNC: 7.4 GM/DL (ref 6–8.4)
PSA SERPL-MCNC: 3.39 NG/ML
RBC # BLD AUTO: 5.23 M/UL (ref 4.6–6.2)
RELATIVE EOSINOPHIL (OHS): 1.6 %
RELATIVE LYMPHOCYTE (OHS): 21.9 % (ref 18–48)
RELATIVE MONOCYTE (OHS): 10.3 % (ref 4–15)
RELATIVE NEUTROPHIL (OHS): 64.7 % (ref 38–73)
SODIUM SERPL-SCNC: 139 MMOL/L (ref 136–145)
TRIGL SERPL-MCNC: 96 MG/DL (ref 30–150)
TSH SERPL-ACNC: 2.05 UIU/ML (ref 0.4–4)
WBC # BLD AUTO: 5.62 K/UL (ref 3.9–12.7)

## 2025-05-12 PROCEDURE — 99214 OFFICE O/P EST MOD 30 MIN: CPT | Mod: PBBFAC,PO | Performed by: FAMILY MEDICINE

## 2025-05-12 PROCEDURE — 99999 PR PBB SHADOW E&M-EST. PATIENT-LVL IV: CPT | Mod: PBBFAC,,, | Performed by: FAMILY MEDICINE

## 2025-05-12 PROCEDURE — 84443 ASSAY THYROID STIM HORMONE: CPT

## 2025-05-12 PROCEDURE — G2211 COMPLEX E/M VISIT ADD ON: HCPCS | Mod: S$PBB,,, | Performed by: FAMILY MEDICINE

## 2025-05-12 PROCEDURE — 85025 COMPLETE CBC W/AUTO DIFF WBC: CPT

## 2025-05-12 PROCEDURE — 36415 COLL VENOUS BLD VENIPUNCTURE: CPT | Mod: PO

## 2025-05-12 PROCEDURE — 84153 ASSAY OF PSA TOTAL: CPT

## 2025-05-12 PROCEDURE — 84460 ALANINE AMINO (ALT) (SGPT): CPT

## 2025-05-12 PROCEDURE — 99214 OFFICE O/P EST MOD 30 MIN: CPT | Mod: S$PBB,,, | Performed by: FAMILY MEDICINE

## 2025-05-12 PROCEDURE — 82465 ASSAY BLD/SERUM CHOLESTEROL: CPT

## 2025-05-12 RX ORDER — ERYTHROMYCIN 5 MG/G
OINTMENT OPHTHALMIC 4 TIMES DAILY
Qty: 1 EACH | Refills: 2 | Status: SHIPPED | OUTPATIENT
Start: 2025-05-12 | End: 2025-05-22

## 2025-05-12 NOTE — PROGRESS NOTES
CHIEF COMPLAINT:  This is a 64-year-old male here for preventive health exam.       SUBJECTIVE: The patient has not been seen in 2 years. He is doing well without complaints except for chronic lower back pain. He is scheduled for RFA lumber spine for lumbar spondylosis.  Patient has hyperlipidemia and takes lovastatin.  He has seborrheic dermatitis and uses clobetasol solution. He has a history of BPH without urinary obstruction. Both his father, grandfather and paternal uncle  had prostate cancer later in life. The patient has a history of left cubital tunnel syndrome since last seen.  The patient exercises 3 times weekly at the [facility].     Eye exam November 2024.  Colonoscopy August 2022 due again in August 2027. Tdap November 2021. Shingrix series completed.     ROS:  GENERAL: Patient denies fever, chills, night sweats. Patient denies weight gain or loss. Patient denies anorexia, fatigue, weakness or swollen glands.  SKIN: Patient denies rash or hair loss.  HEENT: Patient denies sore throat, ear pain, hearing loss, nasal congestion, or runny nose. Patient denies visual disturbance, eye irritation or discharge.  LUNGS: Patient denies cough, wheeze or hemoptysis.  CARDIOVASCULAR: Patient denies chest pain, shortness of breath, palpitations, syncope or lower extremity edema.  GI: Patient denies abdominal pain, nausea, vomiting, diarrhea, constipation, blood in stool or melena.  GENITOURINARY: Patient denies dysuria, frequency, hematuria, nocturia, urgency or incontinence.  MUSCULOSKELETAL: Patient denies joint swelling, redness or warmth.  NEUROLOGIC: Patient denies headache, vertigo, paresthesias, weakness in limb, dysarthria, dysphagia or abnormality of gait.  PSYCHIATRIC: Patient denies anxiety, depression, or memory loss.     OBJECTIVE:   GENERAL: Well-developed well-nourished , white male alert and oriented x3, in no acute distress.   SKIN: Clear without rash. Normal color and tone.  SKs on back.  HEENT: Eyes:  Clear conjunctivae. Pupils equal reactive to light and accommodation. Ears: Clear TMs clear canals. Nose: Without congestion. Pharynx: Without injection or exudates.  NECK: Supple, normal range of motion. No masses, nodes or enlarged thyroid. No JVD. Carotids 2+ and equal. No bruits.  LUNGS: Clear to auscultation. Normal respiratory effort.  CARDIOVASCULAR: Regular rhythm, normal S1, S2 without murmur, gallop or rub.  BACK: No CVA or spinal tenderness.  ABDOMEN: Normal appearance. Active bowel sounds. Soft, nontender without mass or organomegaly. No rebound or guarding.  EXTREMITIES: Without cyanosis, clubbing or edema. Distal pulses 2+ and equal. Normal range of motion in all extremities. No joint effusion, erythema or warmth.  NEUROLOGIC: Cranial nerves II through XII without deficit. Motor strength equal bilaterally. Sensation normal to touch. Deep tendon reflexes 2+ and equal. Gait without abnormality. No tremor. Negative cerebellar signs.  AUNG:  Mild to moderately enlarged prostate, smooth, nontender. No masses. Anorectal exam: No masses, nontender. Heme negative stool x2.    ASSESSMENT:  1. Hyperlipidemia LDL goal <130    2. Benign prostatic hyperplasia, unspecified whether lower urinary tract symptoms present    3. Cubital tunnel syndrome on left    4. Seborrhea corporis    5. Prostate cancer screening      PLAN:  1. Exercise regularly.  2. Age-appropriate counseling.  3. Fasting lab.  4. Refill erythromycin ophthalmic ointment (for stye).    5. Follow-up annually.    30 minutes of total time spent on the encounter, which includes face to face time and non-face to face time preparing to see the patient (eg, review of tests), Obtaining and/or reviewing separately obtained history, Documenting clinical information in the electronic or other health record, Independently interpreting results (not separately reported) and communicating results to the patient/family/caregiver, or Care coordination (not separately  reported).     This note is generated with speech recognition software and is subject to transcription error and sound alike phrases that may be missed by proofreading.

## 2025-05-13 ENCOUNTER — RESULTS FOLLOW-UP (OUTPATIENT)
Dept: FAMILY MEDICINE | Facility: CLINIC | Age: 65
End: 2025-05-13

## 2025-06-01 ENCOUNTER — PATIENT MESSAGE (OUTPATIENT)
Dept: FAMILY MEDICINE | Facility: CLINIC | Age: 65
End: 2025-06-01
Payer: COMMERCIAL

## 2025-06-24 ENCOUNTER — PATIENT MESSAGE (OUTPATIENT)
Dept: SPORTS MEDICINE | Facility: CLINIC | Age: 65
End: 2025-06-24
Payer: COMMERCIAL

## 2025-08-24 DIAGNOSIS — E78.5 HYPERLIPIDEMIA LDL GOAL <130: ICD-10-CM

## 2025-08-26 RX ORDER — LOVASTATIN 10 MG/1
TABLET ORAL
Qty: 90 TABLET | Refills: 2 | Status: SHIPPED | OUTPATIENT
Start: 2025-08-26 | End: 2025-08-27 | Stop reason: SDUPTHER

## 2025-08-27 ENCOUNTER — PATIENT MESSAGE (OUTPATIENT)
Dept: FAMILY MEDICINE | Facility: CLINIC | Age: 65
End: 2025-08-27
Payer: COMMERCIAL

## 2025-08-27 DIAGNOSIS — E78.5 HYPERLIPIDEMIA LDL GOAL <130: ICD-10-CM

## 2025-08-27 RX ORDER — LOVASTATIN 10 MG/1
10 TABLET ORAL NIGHTLY
Qty: 90 TABLET | Refills: 1 | Status: SHIPPED | OUTPATIENT
Start: 2025-08-27 | End: 2025-09-02 | Stop reason: SDUPTHER

## 2025-09-02 RX ORDER — LOVASTATIN 10 MG/1
10 TABLET ORAL NIGHTLY
Qty: 90 TABLET | Refills: 1 | Status: SHIPPED | OUTPATIENT
Start: 2025-09-02